# Patient Record
Sex: FEMALE | Race: BLACK OR AFRICAN AMERICAN | NOT HISPANIC OR LATINO | Employment: FULL TIME | URBAN - METROPOLITAN AREA
[De-identification: names, ages, dates, MRNs, and addresses within clinical notes are randomized per-mention and may not be internally consistent; named-entity substitution may affect disease eponyms.]

---

## 2017-03-24 ENCOUNTER — TELEPHONE (OUTPATIENT)
Dept: FAMILY MEDICINE CLINIC | Facility: CLINIC | Age: 42
End: 2017-03-24

## 2017-03-24 NOTE — TELEPHONE ENCOUNTER
----- Message from CHANDRAKANT Wiley sent at 3/24/2017 10:41 AM EDT -----  Contact: [phone]  What did she do when she fell? Is the visit for knee pain? Hand? Depends what is going on. i have a couple of openings later but depends what is going on if she should be seen here- if more severe ICC or ER (like stiches, possible broken bone, etc.)  ----- Message -----     From: Mariah Garcia MA     Sent: 3/24/2017  10:34 AM       To: CHANDRAKANT Wiley        ----- Message -----     From: Rosalva Norwood     Sent: 3/24/2017   9:13 AM       To: Mariah Garcia MA    PT FELL AND WANTS TO SEE SOMEONE TODAY. PLEASE CALL SHE SAID SHE DOESN'T WANT TO GO TO ER BECAUSE IT'S MORE EXPENSIVE    Pt informed and she said she fell running because of an emergency and fell and hurt her knee. Right knee is in more pain than the left. Advised ER if symptoms get worse.

## 2017-05-17 RX ORDER — PANTOPRAZOLE SODIUM 40 MG/1
TABLET, DELAYED RELEASE ORAL
Qty: 90 TABLET | Refills: 2 | Status: SHIPPED | OUTPATIENT
Start: 2017-05-17 | End: 2018-02-11 | Stop reason: SDUPTHER

## 2017-05-30 ENCOUNTER — TELEPHONE (OUTPATIENT)
Dept: FAMILY MEDICINE CLINIC | Facility: CLINIC | Age: 42
End: 2017-05-30

## 2017-07-19 RX ORDER — NORETHINDRONE ACETATE AND ETHINYL ESTRADIOL 1MG-20(21)
KIT ORAL
Qty: 30 TABLET | Refills: 0 | Status: SHIPPED | OUTPATIENT
Start: 2017-07-19 | End: 2017-07-28 | Stop reason: SDUPTHER

## 2017-07-28 RX ORDER — NORETHINDRONE ACETATE AND ETHINYL ESTRADIOL 1MG-20(21)
KIT ORAL
Qty: 28 TABLET | Refills: 1 | Status: SHIPPED | OUTPATIENT
Start: 2017-07-28 | End: 2017-11-22 | Stop reason: SDUPTHER

## 2017-08-17 ENCOUNTER — OFFICE VISIT (OUTPATIENT)
Dept: FAMILY MEDICINE CLINIC | Facility: CLINIC | Age: 42
End: 2017-08-17

## 2017-08-17 VITALS
HEIGHT: 66 IN | WEIGHT: 218 LBS | SYSTOLIC BLOOD PRESSURE: 104 MMHG | DIASTOLIC BLOOD PRESSURE: 58 MMHG | TEMPERATURE: 97.9 F | BODY MASS INDEX: 35.03 KG/M2 | OXYGEN SATURATION: 100 % | HEART RATE: 62 BPM

## 2017-08-17 DIAGNOSIS — R20.2 NUMBNESS AND TINGLING IN BOTH HANDS: ICD-10-CM

## 2017-08-17 DIAGNOSIS — M54.2 NECK PAIN: Primary | ICD-10-CM

## 2017-08-17 DIAGNOSIS — R20.0 NUMBNESS AND TINGLING IN BOTH HANDS: ICD-10-CM

## 2017-08-17 PROCEDURE — 99214 OFFICE O/P EST MOD 30 MIN: CPT | Performed by: FAMILY MEDICINE

## 2017-08-17 PROCEDURE — 72050 X-RAY EXAM NECK SPINE 4/5VWS: CPT | Performed by: FAMILY MEDICINE

## 2017-08-17 PROCEDURE — 72100 X-RAY EXAM L-S SPINE 2/3 VWS: CPT | Performed by: FAMILY MEDICINE

## 2017-08-17 RX ORDER — ALBUTEROL SULFATE 90 UG/1
2 AEROSOL, METERED RESPIRATORY (INHALATION) EVERY 4 HOURS PRN
COMMUNITY
End: 2017-11-22 | Stop reason: SDUPTHER

## 2017-08-17 RX ORDER — CYCLOBENZAPRINE HCL 5 MG
5 TABLET ORAL EVERY 8 HOURS PRN
Qty: 30 TABLET | Refills: 1 | Status: SHIPPED | OUTPATIENT
Start: 2017-08-17 | End: 2017-11-22

## 2017-08-17 RX ORDER — MELOXICAM 15 MG/1
15 TABLET ORAL DAILY
Qty: 30 TABLET | Refills: 1 | Status: SHIPPED | OUTPATIENT
Start: 2017-08-17 | End: 2018-06-07

## 2017-08-17 NOTE — PROGRESS NOTES
SUBJECTIVE:  The patient is a 42-year-old Afro-American female who comes in primarily for pain in her neck and numbness in her hands.  She's been going to physical therapy for 8 weeks and it's not helping.  She denies any specific injury.  Her job is physical and she uses her upper extremities quite a bit at work.  Her hands get numb primarily in the thumb second and third digits.  She doesn't recall any numbness in the remaining 2 digits.  She is also having discomfort in her lower back.  This does not radiate to her legs.    PAST MEDICAL HISTORY:  Reviewed.    REVIEW OF SYSTEMS:  Please see above; 14 point ROS otherwise negative.      OBJECTIVE: Vitals signs are reviewed and are stable.    HEENT: PERRLA.    Neck:  Supple.  Mild tenderness bilateral posterior cervical region.  Discomfort with left lateral movement.  Range of motion full.  Lungs:  Clear.    Heart:  Regular rate and rhythm.    Abdomen:   Soft, nontender.    Extremities:  No cyanosis, clubbing or edema.  Full range of motion.  Neuro: Grossly intact without motor or sensory deficits.  Deep tendon reflexes symmetrical.  Back: Mild tenderness is present in the left lumbar region.  Full range of motion.  Straight leg raise is negative.  Two-view x-ray lumbar spine shows no acute abnormality.  4 view x-ray of the C-spine shows loss of lordotic curve.  No acute bony abnormality.  Indication neck pain back pain.  No old x-rays for review.  X-rays done here and interpreted by me.  ASSESSMENT:    ]Neck pain  Paresthesia of the hands  Low-back pain    PLAN:  []The distribution of the paresthesia is suggestive of carpal tunnel syndrome.  The patient has not been on any medication for her symptoms.  She is prescribed meloxicam 15 mg daily and Flexeril 5 mg 3 times a day or daily at bedtime if too sedating.  She will continue physical therapy.  She will take vitamin B6.  She will notify me in a week or so if her symptoms have improved worsen or stayed the same.   At that time either EMG or MRI will be ordered.

## 2017-08-25 ENCOUNTER — TELEPHONE (OUTPATIENT)
Dept: FAMILY MEDICINE CLINIC | Facility: CLINIC | Age: 42
End: 2017-08-25

## 2017-08-28 ENCOUNTER — DOCUMENTATION (OUTPATIENT)
Dept: FAMILY MEDICINE CLINIC | Facility: CLINIC | Age: 42
End: 2017-08-28

## 2017-08-28 NOTE — PROGRESS NOTES
6/21/2017 10:16 AM    Patient called complaining of diarrhea and abdominal pain patient was instructed to go to emergency room.

## 2017-11-01 ENCOUNTER — TELEPHONE (OUTPATIENT)
Dept: FAMILY MEDICINE CLINIC | Facility: CLINIC | Age: 42
End: 2017-11-01

## 2017-11-22 ENCOUNTER — PROCEDURE VISIT (OUTPATIENT)
Dept: FAMILY MEDICINE CLINIC | Facility: CLINIC | Age: 42
End: 2017-11-22

## 2017-11-22 VITALS
BODY MASS INDEX: 35.52 KG/M2 | HEIGHT: 66 IN | OXYGEN SATURATION: 95 % | TEMPERATURE: 97.8 F | DIASTOLIC BLOOD PRESSURE: 66 MMHG | SYSTOLIC BLOOD PRESSURE: 100 MMHG | HEART RATE: 80 BPM | WEIGHT: 221 LBS

## 2017-11-22 DIAGNOSIS — Z01.419 ENCOUNTER FOR ROUTINE GYNECOLOGICAL EXAMINATION WITH PAPANICOLAOU SMEAR OF CERVIX: ICD-10-CM

## 2017-11-22 DIAGNOSIS — Z12.39 SPECIAL SCREENING EXAMINATION FOR NEOPLASM OF BREAST: ICD-10-CM

## 2017-11-22 DIAGNOSIS — Z12.9 CANCER SCREENING: Primary | ICD-10-CM

## 2017-11-22 DIAGNOSIS — S60.10XA SUBUNGUAL HEMATOMA OF DIGIT OF HAND, INITIAL ENCOUNTER: ICD-10-CM

## 2017-11-22 PROCEDURE — 99396 PREV VISIT EST AGE 40-64: CPT | Performed by: FAMILY MEDICINE

## 2017-11-22 RX ORDER — NORETHINDRONE ACETATE AND ETHINYL ESTRADIOL 1MG-20(21)
1 KIT ORAL DAILY
Qty: 28 TABLET | Refills: 1 | Status: SHIPPED | OUTPATIENT
Start: 2017-11-22 | End: 2017-12-31 | Stop reason: SDUPTHER

## 2017-11-22 RX ORDER — NORETHINDRONE ACETATE AND ETHINYL ESTRADIOL 1MG-20(21)
1 KIT ORAL DAILY
Qty: 28 TABLET | Refills: 0 | Status: SHIPPED | OUTPATIENT
Start: 2017-11-22 | End: 2017-11-22 | Stop reason: SDUPTHER

## 2017-11-22 RX ORDER — ALBUTEROL SULFATE 90 UG/1
2 AEROSOL, METERED RESPIRATORY (INHALATION) EVERY 4 HOURS PRN
Qty: 1 INHALER | Refills: 1 | Status: SHIPPED | OUTPATIENT
Start: 2017-11-22 | End: 2019-09-18 | Stop reason: SDUPTHER

## 2017-11-22 RX ORDER — FLUCONAZOLE 150 MG/1
150 TABLET ORAL ONCE
Qty: 1 TABLET | Refills: 0 | Status: SHIPPED | OUTPATIENT
Start: 2017-11-22 | End: 2017-11-22

## 2017-11-22 RX ORDER — FLUCONAZOLE 150 MG/1
150 TABLET ORAL ONCE
Qty: 1 TABLET | Refills: 2 | Status: SHIPPED | OUTPATIENT
Start: 2017-11-22 | End: 2017-11-22

## 2017-11-22 NOTE — PROGRESS NOTES
SUBJECTIVE:  The patient is [] a 42-year-old F chimeric female is here for Pap smear and breast exam.  She is doing fairly well.  She's had slight vaginal itching.  This started after she took an antibiotic.  She is yet to have a baseline mammogram.    PAST MEDICAL HISTORY:  Reviewed.    REVIEW OF SYSTEMS:  Please see above; 14 point ROS otherwise negative.      OBJECTIVE: Vitals signs are reviewed and are stable.    HEENT: PERRLA.  []  Neck:  Supple.  []  Lungs:  Clear.    Heart:  Regular rate and rhythm.  []  Breasts: No abnormal masses are detected.  No dimpling.  No nipple discharge.  Axilla free of masses.  Abdomen:   Soft, nontender.  []  Pelvic: External genitalia normal.  No adnexal masses or tenderness.  Uterus not enlarged.  No discharge noted.  EXT: Sublingual hematoma noted on the left great toe.  No signs of infection.  Nontender.            ASSESSMENT:    []Routine GYN/breast exam  Subungual hematoma left great toe    PLAN:  []Patient agrees to have a baseline mammogram.  Diflucan 150 by mouth ×1.  Oral contraceptive refill.  Observation of hematoma.  Patient will follow up as scheduled.  She will call if problems.  Diet and exercise discussed.  Patient will follow-up on results.    Much of this encounter note is an electronic transcription/translation of spoken language to printed text.  The electronic translation of spoken language may permit erroneous, or at times, nonsensical words or phrases to be inadvertently transcribed.  Although I have reviewed the note for such errors, some may still exist.

## 2017-11-25 LAB
CHROM ANALY OVERALL INTERP-IMP: NORMAL
CONV .: NORMAL
CONV PERFORMED BY:: NORMAL
DX ICD CODE: NORMAL
HIV 1 & 2 AB SER-IMP: NORMAL
REF LAB TEST METHOD: NORMAL
STAT OF ADQ CVX/VAG CYTO-IMP: NORMAL

## 2018-01-01 RX ORDER — NORETHINDRONE ACETATE AND ETHINYL ESTRADIOL 1MG-20(21)
KIT ORAL
Qty: 28 TABLET | Refills: 0 | Status: SHIPPED | OUTPATIENT
Start: 2018-01-01 | End: 2018-01-10 | Stop reason: SDUPTHER

## 2018-01-04 ENCOUNTER — HOSPITAL ENCOUNTER (OUTPATIENT)
Dept: MAMMOGRAPHY | Facility: HOSPITAL | Age: 43
Discharge: HOME | End: 2018-01-04
Admitting: FAMILY MEDICINE

## 2018-01-04 ENCOUNTER — TELEPHONE (OUTPATIENT)
Dept: FAMILY MEDICINE CLINIC | Facility: CLINIC | Age: 43
End: 2018-01-04

## 2018-01-04 PROCEDURE — 77067 SCR MAMMO BI INCL CAD: CPT

## 2018-01-11 RX ORDER — NORETHINDRONE ACETATE AND ETHINYL ESTRADIOL 1MG-20(21)
KIT ORAL
Qty: 28 TABLET | Refills: 1 | Status: SHIPPED | OUTPATIENT
Start: 2018-01-11 | End: 2018-01-18 | Stop reason: SDUPTHER

## 2018-01-18 ENCOUNTER — TELEPHONE (OUTPATIENT)
Dept: FAMILY MEDICINE CLINIC | Facility: CLINIC | Age: 43
End: 2018-01-18

## 2018-01-18 RX ORDER — NORETHINDRONE ACETATE AND ETHINYL ESTRADIOL 1MG-20(21)
1 KIT ORAL DAILY
Qty: 28 TABLET | Refills: 6 | Status: SHIPPED | OUTPATIENT
Start: 2018-01-18 | End: 2018-02-01 | Stop reason: SDUPTHER

## 2018-02-01 RX ORDER — NORETHINDRONE ACETATE AND ETHINYL ESTRADIOL 1MG-20(21)
1 KIT ORAL DAILY
Qty: 28 TABLET | Refills: 6 | Status: SHIPPED | OUTPATIENT
Start: 2018-02-01 | End: 2018-06-07

## 2018-02-12 RX ORDER — PANTOPRAZOLE SODIUM 40 MG/1
TABLET, DELAYED RELEASE ORAL
Qty: 90 TABLET | Refills: 2 | Status: SHIPPED | OUTPATIENT
Start: 2018-02-12 | End: 2018-07-03 | Stop reason: SDUPTHER

## 2018-05-21 RX ORDER — PANTOPRAZOLE SODIUM 40 MG/1
TABLET, DELAYED RELEASE ORAL
Qty: 30 TABLET | Refills: 2 | Status: SHIPPED | OUTPATIENT
Start: 2018-05-21 | End: 2018-11-11 | Stop reason: SDUPTHER

## 2018-06-07 ENCOUNTER — OFFICE VISIT (OUTPATIENT)
Dept: FAMILY MEDICINE CLINIC | Facility: CLINIC | Age: 43
End: 2018-06-07

## 2018-06-07 VITALS
DIASTOLIC BLOOD PRESSURE: 60 MMHG | HEIGHT: 66 IN | HEART RATE: 74 BPM | TEMPERATURE: 98.6 F | WEIGHT: 221.6 LBS | RESPIRATION RATE: 16 BRPM | OXYGEN SATURATION: 100 % | SYSTOLIC BLOOD PRESSURE: 100 MMHG | BODY MASS INDEX: 35.62 KG/M2

## 2018-06-07 DIAGNOSIS — Z00.00 HEALTHCARE MAINTENANCE: Primary | ICD-10-CM

## 2018-06-07 DIAGNOSIS — Z30.09 GENERAL COUNSELING AND ADVICE ON FEMALE CONTRACEPTION: ICD-10-CM

## 2018-06-07 DIAGNOSIS — M54.2 NECK PAIN: ICD-10-CM

## 2018-06-07 DIAGNOSIS — G56.01 CARPAL TUNNEL SYNDROME OF RIGHT WRIST: ICD-10-CM

## 2018-06-07 DIAGNOSIS — Z23 NEED FOR VACCINATION: ICD-10-CM

## 2018-06-07 PROCEDURE — 90471 IMMUNIZATION ADMIN: CPT | Performed by: NURSE PRACTITIONER

## 2018-06-07 PROCEDURE — 99214 OFFICE O/P EST MOD 30 MIN: CPT | Performed by: NURSE PRACTITIONER

## 2018-06-07 PROCEDURE — 90632 HEPA VACCINE ADULT IM: CPT | Performed by: NURSE PRACTITIONER

## 2018-06-07 RX ORDER — RANITIDINE 150 MG/1
TABLET ORAL
Refills: 11 | COMMUNITY
Start: 2018-03-07 | End: 2019-07-11

## 2018-06-07 NOTE — PATIENT INSTRUCTIONS
She will return for fasting labs.   Refer to GYN for consult about surgical contraception management.   Refer to hand surgery will call with appt.   MRI cervical ordered will call with appt.   Hep A dose #1 given today, repeat dose #2 in 6-12 months.   encouraged heat and ice alternating as needed, good footwear, cont PT as needed.   Increase fluid intake, get plenty of rest.   Patient agrees with plan of care and understands instructions. Call if worsening symptoms or any problems or concerns.

## 2018-06-07 NOTE — PROGRESS NOTES
Subjective   Vianney Villarreal is a 42 y.o. female.     History of Present Illness   Here today for arthralgia, she works at [employer], physical [employer], she went to PT but not helping, she has neck pain, has pain down right arm at times, she is right handed, does repetitive job, she has tried aleve, tried mobic but not helping, she has tried muscle relaxer, makes too drowsy, she would like MRI neck. She has pain in right shoulder upper back. She denies low back pain. She does a lot of hammering and twisting with right arm. She has sharp pain in upper back and neck. She denies numbness and tingling.   She would also like hep A injection today. She is over due for labs. She is currently taking OCP, she states last pap last year, does not see GYN, she would like to stop OCP and interested in tubal. She does have regular menses, she is not sexually active.     The following portions of the patient's history were reviewed and updated as appropriate: allergies, current medications, past family history, past medical history, past social history, past surgical history and problem list.    Review of Systems   Constitutional: Negative for chills, diaphoresis and fever.   Respiratory: Negative for cough and shortness of breath.    Cardiovascular: Negative for chest pain.   Musculoskeletal: Positive for neck pain. Negative for arthralgias and myalgias.   Skin: Negative for pallor.   Neurological: Negative for dizziness, light-headedness and headache.   All other systems reviewed and are negative.      Objective   Physical Exam   Constitutional: She is oriented to person, place, and time. She appears well-developed and well-nourished.   HENT:   Head: Normocephalic.   Eyes: Pupils are equal, round, and reactive to light.   Neck: Spinous process tenderness and muscular tenderness present. Decreased range of motion present.   Cardiovascular: Normal rate, regular rhythm, normal heart sounds and intact distal pulses.    Pulmonary/Chest: Effort  normal and breath sounds normal.   Musculoskeletal:        Right shoulder: Normal.        Cervical back: She exhibits decreased range of motion, tenderness and bony tenderness. She exhibits no swelling, no edema, no pain, no spasm and normal pulse.        Right hand: She exhibits normal range of motion, no tenderness, no bony tenderness, normal capillary refill and no swelling. Normal sensation noted. Normal strength noted.   Lymphadenopathy:     She has no cervical adenopathy.   Neurological: She is alert and oriented to person, place, and time. She has normal strength. No cranial nerve deficit or sensory deficit. She displays a negative Romberg sign.   Skin: Skin is warm and dry.   Psychiatric: She has a normal mood and affect. Her behavior is normal.   Nursing note and vitals reviewed.        Assessment/Plan   Vianney was seen today for generalized body aches, immunizations and contraception.    Diagnoses and all orders for this visit:    Healthcare maintenance  -     CBC & Differential; Future  -     Comprehensive Metabolic Panel; Future  -     TSH; Future  -     Lipid Panel; Future    Need for vaccination    Neck pain  -     CBC & Differential; Future  -     Comprehensive Metabolic Panel; Future  -     MRI Cervical Spine Without Contrast; Future    Carpal tunnel syndrome of right wrist  -     Ambulatory Referral to Hand Surgery    General counseling and advice on female contraception  -     Ambulatory Referral to Obstetrics / Gynecology    Other orders  -     Hepatitis A Vaccine Adult IM      She will return for fasting labs.   Refer to GYN for consult about surgical contraception management.   Refer to hand surgery will call with appt.   MRI cervical ordered will call with appt.   Hep A dose #1 given today, repeat dose #2 in 6-12 months.   encouraged heat and ice alternating as needed, good footwear, cont PT as needed.   Increase fluid intake, get plenty of rest.   Patient agrees with plan of care and  understands instructions. Call if worsening symptoms or any problems or concerns.

## 2018-06-14 ENCOUNTER — TELEPHONE (OUTPATIENT)
Dept: FAMILY MEDICINE CLINIC | Facility: CLINIC | Age: 43
End: 2018-06-14

## 2018-06-14 ENCOUNTER — LAB (OUTPATIENT)
Dept: FAMILY MEDICINE CLINIC | Facility: CLINIC | Age: 43
End: 2018-06-14

## 2018-06-14 DIAGNOSIS — Z00.00 HEALTHCARE MAINTENANCE: ICD-10-CM

## 2018-06-14 DIAGNOSIS — M54.2 NECK PAIN: ICD-10-CM

## 2018-06-14 LAB
ALBUMIN SERPL-MCNC: 3.7 G/DL (ref 3.5–5.2)
ALBUMIN/GLOB SERPL: 1.2 G/DL
ALP SERPL-CCNC: 62 U/L (ref 39–117)
ALT SERPL W P-5'-P-CCNC: 19 U/L (ref 1–33)
ANION GAP SERPL CALCULATED.3IONS-SCNC: 10.8 MMOL/L
AST SERPL-CCNC: 23 U/L (ref 1–32)
BILIRUB SERPL-MCNC: 0.8 MG/DL (ref 0.1–1.2)
BUN BLD-MCNC: 11 MG/DL (ref 6–20)
BUN/CREAT SERPL: 12.4 (ref 7–25)
CALCIUM SPEC-SCNC: 9.1 MG/DL (ref 8.6–10.5)
CHLORIDE SERPL-SCNC: 105 MMOL/L (ref 98–107)
CHOLEST SERPL-MCNC: 160 MG/DL (ref 0–200)
CO2 SERPL-SCNC: 25.2 MMOL/L (ref 22–29)
CREAT BLD-MCNC: 0.89 MG/DL (ref 0.57–1)
ERYTHROCYTE [DISTWIDTH] IN BLOOD BY AUTOMATED COUNT: 12.4 % (ref 4.5–15)
GFR SERPL CREATININE-BSD FRML MDRD: 85 ML/MIN/1.73
GLOBULIN UR ELPH-MCNC: 3.2 GM/DL
GLUCOSE BLD-MCNC: 98 MG/DL (ref 65–99)
HCT VFR BLD AUTO: 40.3 % (ref 31–42)
HDLC SERPL-MCNC: 66 MG/DL (ref 40–60)
HGB BLD-MCNC: 13.9 G/DL (ref 12–18)
LDLC SERPL CALC-MCNC: 79 MG/DL (ref 0–100)
LDLC/HDLC SERPL: 1.19 {RATIO}
LYMPHOCYTES # BLD AUTO: 2.2 10*3/MM3 (ref 1.2–3.4)
LYMPHOCYTES NFR BLD AUTO: 40.3 % (ref 21–51)
MCH RBC QN AUTO: 31.3 PG (ref 26.1–33.1)
MCHC RBC AUTO-ENTMCNC: 34.5 G/DL (ref 33–37)
MCV RBC AUTO: 90.7 FL (ref 80–99)
MONOCYTES # BLD AUTO: 0.3 10*3/MM3 (ref 0.1–0.6)
MONOCYTES NFR BLD AUTO: 5.7 % (ref 2–9)
NEUTROPHILS # BLD AUTO: 3 10*3/MM3 (ref 1.4–6.5)
NEUTROPHILS NFR BLD AUTO: 54 % (ref 42–75)
PLATELET # BLD AUTO: 336 10*3/MM3 (ref 150–450)
PMV BLD AUTO: 7.9 FL (ref 7.1–10.5)
POTASSIUM BLD-SCNC: 4.2 MMOL/L (ref 3.5–5.2)
PROT SERPL-MCNC: 6.9 G/DL (ref 6–8.5)
RBC # BLD AUTO: 4.45 10*6/MM3 (ref 4–6)
SODIUM BLD-SCNC: 141 MMOL/L (ref 136–145)
TRIGL SERPL-MCNC: 76 MG/DL (ref 0–150)
TSH SERPL DL<=0.05 MIU/L-ACNC: 0.85 MIU/ML (ref 0.27–4.2)
VLDLC SERPL-MCNC: 15.2 MG/DL (ref 5–40)
WBC NRBC COR # BLD: 5.5 10*3/MM3 (ref 4.5–10)

## 2018-06-14 PROCEDURE — 84443 ASSAY THYROID STIM HORMONE: CPT | Performed by: NURSE PRACTITIONER

## 2018-06-14 PROCEDURE — 36415 COLL VENOUS BLD VENIPUNCTURE: CPT | Performed by: NURSE PRACTITIONER

## 2018-06-14 PROCEDURE — 80053 COMPREHEN METABOLIC PANEL: CPT | Performed by: NURSE PRACTITIONER

## 2018-06-14 PROCEDURE — 85025 COMPLETE CBC W/AUTO DIFF WBC: CPT | Performed by: NURSE PRACTITIONER

## 2018-06-14 PROCEDURE — 80061 LIPID PANEL: CPT | Performed by: NURSE PRACTITIONER

## 2018-06-14 NOTE — TELEPHONE ENCOUNTER
----- Message from CHANDRAKANT Manuel sent at 6/14/2018 12:26 PM EDT -----  Please call patient with results. Thyroid, BS, kidney and liver func, CHOL all good.    Pt informed of lab results

## 2018-06-14 NOTE — TELEPHONE ENCOUNTER
----- Message from CHANDRAKANT Manuel sent at 6/14/2018 11:41 AM EDT -----  Please call patient with results. Cbc is normal.    Pt informed of lab results

## 2018-06-14 NOTE — TELEPHONE ENCOUNTER
----- Message from CHANDRAKANT Manuel sent at 6/14/2018 12:26 PM EDT -----  Please call patient with results. Thyroid, BS, kidney and liver func, CHOL all good.    LMTRC

## 2018-06-19 ENCOUNTER — HOSPITAL ENCOUNTER (OUTPATIENT)
Dept: MRI IMAGING | Facility: HOSPITAL | Age: 43
Discharge: HOME | End: 2018-06-19
Admitting: NURSE PRACTITIONER

## 2018-06-19 DIAGNOSIS — M54.2 NECK PAIN: ICD-10-CM

## 2018-06-19 PROCEDURE — 72141 MRI NECK SPINE W/O DYE: CPT

## 2018-06-21 ENCOUNTER — TELEPHONE (OUTPATIENT)
Dept: FAMILY MEDICINE CLINIC | Facility: CLINIC | Age: 43
End: 2018-06-21

## 2018-06-21 DIAGNOSIS — M50.30 BULGING OF CERVICAL INTERVERTEBRAL DISC: ICD-10-CM

## 2018-06-21 DIAGNOSIS — R93.89 ABNORMAL MRI, NECK: ICD-10-CM

## 2018-06-21 DIAGNOSIS — M54.2 NECK PAIN: Primary | ICD-10-CM

## 2018-06-21 NOTE — TELEPHONE ENCOUNTER
[facility] CALLED REGARDING A NEW RX BY THE NAME OF BLISOVI SE 1/20 TABLET, 28 1/20 (SPELLING?)

## 2018-06-21 NOTE — TELEPHONE ENCOUNTER
----- Message from CHANDRAKANT Manuel sent at 6/21/2018 11:08 AM EDT -----  Please call patient with results.  MRI neck shows C5-6 disc protrusion, bulging disc and narrowing at C5-6, and a bone spur on C4-5 causing narrowing, will put in neurosurgery referral and she will be called with appt.    Pt informed of MRI results

## 2018-06-26 ENCOUNTER — TELEPHONE (OUTPATIENT)
Dept: FAMILY MEDICINE CLINIC | Facility: CLINIC | Age: 43
End: 2018-06-26

## 2018-07-03 ENCOUNTER — OFFICE VISIT (OUTPATIENT)
Dept: NEUROSURGERY | Facility: CLINIC | Age: 43
End: 2018-07-03

## 2018-07-03 VITALS
RESPIRATION RATE: 16 BRPM | WEIGHT: 227 LBS | HEART RATE: 76 BPM | DIASTOLIC BLOOD PRESSURE: 62 MMHG | SYSTOLIC BLOOD PRESSURE: 102 MMHG | HEIGHT: 66 IN | BODY MASS INDEX: 36.48 KG/M2

## 2018-07-03 DIAGNOSIS — E66.9 OBESITY (BMI 35.0-39.9 WITHOUT COMORBIDITY): ICD-10-CM

## 2018-07-03 DIAGNOSIS — M50.30 DDD (DEGENERATIVE DISC DISEASE), CERVICAL: ICD-10-CM

## 2018-07-03 DIAGNOSIS — M54.2 NECK PAIN OF OVER 3 MONTHS DURATION: Primary | ICD-10-CM

## 2018-07-03 DIAGNOSIS — J45.909 MODERATE ASTHMA WITHOUT COMPLICATION, UNSPECIFIED WHETHER PERSISTENT: ICD-10-CM

## 2018-07-03 PROCEDURE — 99244 OFF/OP CNSLTJ NEW/EST MOD 40: CPT | Performed by: NEUROLOGICAL SURGERY

## 2018-07-03 RX ORDER — NORETHINDRONE ACETATE AND ETHINYL ESTRADIOL 1MG-20(21)
1 KIT ORAL DAILY
Refills: 6 | COMMUNITY
Start: 2018-06-16 | End: 2018-10-18 | Stop reason: SDUPTHER

## 2018-07-03 NOTE — PROGRESS NOTES
"Subjective   Patient ID: Vianney Villarreal is a 43 y.o. female is being seen for consultation today at the request of CHANDRAKANT Manuel for neck and right arm pain. She is unaccompanied.    History of Present Illness    She presents to the office today at the request of her primary care provider for neck and right arm pain.  She has had cervical MRI imaging at [facility] on June 19, 2018.    She reports bilateral upper extremity pain and weakness for the past year and a half.  She has been in physical therapy since his symptoms started and does not feel that it has helped.  She ultimately got an MRI because physical therapy was not able to give her any relief.  She works at [employer] on the [employer] line doing lots of repetitive motions.  This makes the pain in her arms and neck worse.  She currently reports posterior neck and bilateral upper back and shoulder discomfort, right greater than left.  She has pain that radiates down the right arm intermittently currently has no pain.  She denies any numbness or tingling in either arm.  She reports weakness with trying to open things, such as jars, at her home.    She denies any problems with her legs.  She also denies any bowel or bladder issues.  She takes Aleve to help with the ongoing discomfort.  She takes this on an as-needed basis.    She presents unaccompanied.      /62 (BP Location: Right arm, Patient Position: Sitting, Cuff Size: Adult)   Pulse 76   Resp 16   Ht 167.6 cm (66\")   Wt 103 kg (227 lb)   BMI 36.64 kg/m²     The following portions of the patient's history were reviewed and updated as appropriate: allergies, current medications, past family history, past medical history, past social history, past surgical history and problem list.    Review of Systems   Constitutional: Negative for fever.   HENT: Negative for trouble swallowing.    Eyes: Negative for visual disturbance.   Respiratory: Negative for cough and wheezing.  "   Cardiovascular: Negative for chest pain and palpitations.   Gastrointestinal: Negative for abdominal pain.   Genitourinary: Negative for difficulty urinating and enuresis.   Musculoskeletal: Positive for neck pain (into RUE intermittently). Negative for gait problem.   Neurological: Positive for weakness (right arm) and numbness (hands when sleeping).   Psychiatric/Behavioral: Negative for sleep disturbance.       Objective   Physical Exam   Constitutional: She is oriented to person, place, and time. Vital signs are normal. She appears well-developed and well-nourished. She is cooperative.   HENT:   Head: Normocephalic and atraumatic.   Neck: Neck supple. No tracheal deviation present.   Pulmonary/Chest: Effort normal and breath sounds normal. No respiratory distress.   Abdominal: Soft.   Musculoskeletal: Normal range of motion. She exhibits tenderness (Bilateral neck and upper back tenderness). She exhibits no edema or deformity.        Cervical back: She exhibits tenderness and pain. She exhibits normal range of motion, no bony tenderness, no swelling, no edema and no deformity.   Moving all extremities well   normal muscle bulk and tone throughout   full Cervical range of motion strength equal in normal bilateral upper extremities     Neurological: She is alert and oriented to person, place, and time. She has normal strength. She displays no tremor and normal reflexes. No cranial nerve deficit or sensory deficit. She exhibits normal muscle tone. Coordination and gait normal. GCS eye subscore is 4. GCS verbal subscore is 5. GCS motor subscore is 6.   Reflex Scores:       Tricep reflexes are 2+ on the right side and 2+ on the left side.       Bicep reflexes are 2+ on the right side and 2+ on the left side.       Brachioradialis reflexes are 2+ on the right side and 2+ on the left side.       Patellar reflexes are 2+ on the right side and 2+ on the left side.       Achilles reflexes are 2+ on the right side and 2+  on the left side.  Negative Spurling's, negative Warren's  Normal motor and sensory exam bilateral upper extremities  Normal deep tendon reflexes  Sensory intact to soft touch, temperature and vibration with normal proprioception  Gait is stable and upright   Skin: Skin is warm and dry.   Psychiatric: She has a normal mood and affect. Her behavior is normal. Thought content normal.   Vitals reviewed.    Neurologic Exam     Mental Status   Oriented to person, place, and time.     Motor Exam     Strength   Strength 5/5 throughout.     Gait, Coordination, and Reflexes     Reflexes   Right brachioradialis: 2+  Left brachioradialis: 2+  Right biceps: 2+  Left biceps: 2+  Right triceps: 2+  Left triceps: 2+  Right patellar: 2+  Left patellar: 2+  Right achilles: 2+  Left achilles: 2+      Assessment/Plan   Independent Review of Radiographic Studies:    I personally reviewed the MRI scan of the cervical spine demonstrates multilevel degenerative arthritic changes as described in the radiology report there are disc osteophyte complexes at C5 6 and at C4 5 without significant canal compromise.  There is some neural foraminal narrowing on the right at C5 6.    Medical Decision Making:    I confirmed and obtained the above history as recorded by the nurse practitioner acting as a scribe. I performed the above examination and it is documented by the nurse practitioner acting as a scribe.    Patient presents with the above-noted history and physical findings.  There are no red flags.    I explained to the patient that I did not think that there was a surgical solution to this particular problem.  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  I did explain to the patient that in light of the arthritic changes in the cervical spine this might not be the best profession for her since she is not really worked heavy physical labor her entire life and has not spent time  "developing the muscles necessary in order to avoid injury.  I do not think that she is suffered a Worker's Compensation injury at this point but I do think that she may be at risk of this since she does have pre-existing neck pain and degenerative arthritic changes in the cervical spine and she is doing heavy physical labor.    The patient presents with the above-noted history and physical findings. There are no red flags.     I explained to the patient that \"preventative maintenance\" of the lumbar spine revolves around 3 specific lifestyle issues. This is related to diet, physical activity, and posture.     I suggested that dietary changes related to absolute maintenance of a ideal body weight significantly reduces stress on the lumbar spine and can reduce chronic low back pain. I also explained that certain foods are more inflammatory than other physicians and that there is growing evidence that the micro-biome Healthy with a diet that is low in simple carbohydrates and high in in plant-based carbohydrates can reduce overall body inflammation and may reduce the development of osteoarthritis over time.     I recommended that she consider specific physical workouts that revolves around core strengthening in order to maintain healthy muscular support of the lumbar spine. I explained that the structure of are spine is similar to all animals but all other animals supports her spine on 4 legs and we, singular among all animals stand on our repair legs and thus we require quite a bit more core musculature to support our lumbar spine in healthful manner.     And finally I recommended a book called \"8 Steps to a Pain Free Back\" which is written by Flory Carter and available on [employer] for less than the price of a single physical therapy co-pay. This book outlines this injury that posture plays a very specific role in the development of the western civilizations epitympanic of spine disease. It compares third world " "economy's and our first world relatively physically less challenging lifestyle. It points out that the postural habits of people in the third world are ingrained and that they suffer from minimal complaints with regard to back discomfort or neck discomfort despite the substantial increase in physical labor that they perform on a daily basis. Most importantly this book outlines and exercise program to strengthen core musculature and other muscles of posture both from the spine in the lumbar region and the neck.And finally I recommended a book called \"Eight Steps to a Pain Free Back\" which is written by Flory Carter and available on [employer] for less than the price of a single physical therapy co-pay. This book outlines this injury that posture plays a very specific role in the development of the western civilizations epidemic of spine disease. It compares third world economy's and our first world relatively physically less challenging lifestyle. It points out that the postural habits of people in the third world are ingrained and that they suffer from minimal complaints with regard to back discomfort or neck discomfort despite the substantial increase in physical labor that they perform on a daily basis. Most importantly this book outlines an exercise program to strengthen core musculature and other muscles of posture both from the spine in the lumbar region and the neck. It has worked for me and may other pateints who have tried the exercise program.    Vianney was seen today for neck pain and arm pain.    Diagnoses and all orders for this visit:    Neck pain of over 3 months duration    DDD (degenerative disc disease), cervical    Obesity (BMI 35.0-39.9 without comorbidity)    Moderate asthma without complication, unspecified whether persistent      Return if symptoms worsen or fail to improve.                 "

## 2018-07-25 ENCOUNTER — TELEPHONE (OUTPATIENT)
Dept: FAMILY MEDICINE CLINIC | Facility: CLINIC | Age: 43
End: 2018-07-25

## 2018-10-18 ENCOUNTER — OFFICE VISIT (OUTPATIENT)
Dept: FAMILY MEDICINE CLINIC | Facility: CLINIC | Age: 43
End: 2018-10-18

## 2018-10-18 VITALS
SYSTOLIC BLOOD PRESSURE: 96 MMHG | BODY MASS INDEX: 37.12 KG/M2 | TEMPERATURE: 98.1 F | WEIGHT: 231 LBS | HEIGHT: 66 IN | HEART RATE: 75 BPM | OXYGEN SATURATION: 99 % | DIASTOLIC BLOOD PRESSURE: 60 MMHG

## 2018-10-18 DIAGNOSIS — E66.9 OBESITY (BMI 35.0-39.9 WITHOUT COMORBIDITY): ICD-10-CM

## 2018-10-18 DIAGNOSIS — Z30.09 STERILIZATION CONSULT: ICD-10-CM

## 2018-10-18 DIAGNOSIS — Z12.39 SCREENING FOR BREAST CANCER: ICD-10-CM

## 2018-10-18 DIAGNOSIS — Z12.4 ENCOUNTER FOR PAPANICOLAOU SMEAR FOR CERVICAL CANCER SCREENING: Primary | ICD-10-CM

## 2018-10-18 PROCEDURE — 99396 PREV VISIT EST AGE 40-64: CPT | Performed by: FAMILY MEDICINE

## 2018-10-18 RX ORDER — NORETHINDRONE ACETATE AND ETHINYL ESTRADIOL 1MG-20(21)
1 KIT ORAL DAILY
Qty: 28 TABLET | Refills: 11 | Status: SHIPPED | OUTPATIENT
Start: 2018-10-18 | End: 2018-11-28 | Stop reason: SDUPTHER

## 2018-10-18 RX ORDER — FLUCONAZOLE 150 MG/1
150 TABLET ORAL ONCE
Qty: 1 TABLET | Refills: 0 | Status: SHIPPED | OUTPATIENT
Start: 2018-10-18 | End: 2018-10-18

## 2018-10-18 NOTE — PROGRESS NOTES
SUBJECTIVE:  The patient is a 43-year-old -American female who presents for gynecologic exam.  She has concerns over losing weight.  She gains about 5 pounds here.  She had normal thyroid studies fairly recently.  She needs a renewal of her birth control pill.  She wants her tubes tied.  She complains of vaginal itching.    PAST MEDICAL HISTORY:  Reviewed.    REVIEW OF SYSTEMS:  Please see above; 14 point ROS negative.      OBJECTIVE: Vitals signs are reviewed and are stable.    HEENT: PERRLA.   Neck:  Supple.   Lungs:  Clear.    Heart:  Regular rate and rhythm.  Breasts: Fibrocystic changes noted.  Axilla free of masses.  No nipple discharge.  No dimpling.  No abnormal masses detected.   Abdomen:   Soft, nontender.   Ultimate: Scant whitish discharge noted.  No adnexal masses or tenderness can be felt.  Uterus not enlarged.  Extremities:  No cyanosis, clubbing or edema.     ASSESSMENT:    Gynecological/breast exam  Vaginitis  Request for sterilization  Inability to lose weight    PLAN: Refer to GYN.  Mammogram scheduled.  Diflucan 150×1.  Refill oral contraceptives.  1800-calorie diet.  Healthy lifestyle discussed.  Patient will follow up on Pap smear.

## 2018-11-12 RX ORDER — PANTOPRAZOLE SODIUM 40 MG/1
TABLET, DELAYED RELEASE ORAL
Qty: 90 TABLET | Refills: 2 | Status: SHIPPED | OUTPATIENT
Start: 2018-11-12 | End: 2019-07-11

## 2018-11-28 ENCOUNTER — TELEPHONE (OUTPATIENT)
Dept: FAMILY MEDICINE CLINIC | Facility: CLINIC | Age: 43
End: 2018-11-28

## 2018-11-28 RX ORDER — NORETHINDRONE ACETATE AND ETHINYL ESTRADIOL 1MG-20(21)
1 KIT ORAL DAILY
Qty: 28 TABLET | Refills: 3 | Status: SHIPPED | OUTPATIENT
Start: 2018-11-28 | End: 2019-03-12 | Stop reason: SDUPTHER

## 2018-12-03 ENCOUNTER — TRANSCRIBE ORDERS (OUTPATIENT)
Dept: ADMINISTRATIVE | Facility: HOSPITAL | Age: 43
End: 2018-12-03

## 2018-12-03 DIAGNOSIS — Z12.31 VISIT FOR SCREENING MAMMOGRAM: Primary | ICD-10-CM

## 2019-03-13 RX ORDER — NORETHINDRONE ACETATE AND ETHINYL ESTRADIOL 1MG-20(21)
KIT ORAL
Qty: 28 TABLET | Refills: 1 | Status: SHIPPED | OUTPATIENT
Start: 2019-03-13 | End: 2019-05-14 | Stop reason: SDUPTHER

## 2019-03-28 ENCOUNTER — OFFICE VISIT (OUTPATIENT)
Dept: FAMILY MEDICINE CLINIC | Facility: CLINIC | Age: 44
End: 2019-03-28

## 2019-03-28 VITALS
SYSTOLIC BLOOD PRESSURE: 110 MMHG | DIASTOLIC BLOOD PRESSURE: 64 MMHG | TEMPERATURE: 98.3 F | BODY MASS INDEX: 37.9 KG/M2 | WEIGHT: 235.8 LBS | OXYGEN SATURATION: 93 % | HEART RATE: 78 BPM | HEIGHT: 66 IN

## 2019-03-28 DIAGNOSIS — M25.562 ACUTE PAIN OF LEFT KNEE: Primary | ICD-10-CM

## 2019-03-28 PROCEDURE — 73560 X-RAY EXAM OF KNEE 1 OR 2: CPT | Performed by: NURSE PRACTITIONER

## 2019-03-28 PROCEDURE — 99213 OFFICE O/P EST LOW 20 MIN: CPT | Performed by: NURSE PRACTITIONER

## 2019-03-28 NOTE — PROGRESS NOTES
Subjective   Vianney Villarreal is a 43 y.o. female.     History of Present Illness   C/o left knee pain, felt pop, now having more consistent pain, states she felt pain while going up steps about 1 week ago, no injury noted, she felt pain when bending knee. She tried ibuprofen and aleve OTC. She states she has pain when getting up, she denies pain with ambulation.        The following portions of the patient's history were reviewed and updated as appropriate: allergies, current medications, past family history, past medical history, past social history, past surgical history and problem list.    Review of Systems   Constitutional: Negative for chills, diaphoresis and fever.   Respiratory: Negative for cough and shortness of breath.    Cardiovascular: Negative for chest pain.   Musculoskeletal: Positive for arthralgias. Negative for myalgias.   Neurological: Negative for dizziness, light-headedness and headache.   All other systems reviewed and are negative.      Objective   Physical Exam   Constitutional: She is oriented to person, place, and time. She appears well-developed and well-nourished.   HENT:   Head: Normocephalic.   Eyes: Pupils are equal, round, and reactive to light.   Cardiovascular: Normal rate, regular rhythm, normal heart sounds and intact distal pulses.   Pulmonary/Chest: Effort normal and breath sounds normal.   Musculoskeletal: Normal range of motion.        Right knee: Normal.        Left knee: She exhibits swelling. She exhibits normal range of motion and no effusion. Tenderness found. Patellar tendon tenderness noted.   Neurological: She is alert and oriented to person, place, and time.   Skin: Skin is warm and dry.   Psychiatric: She has a normal mood and affect. Her behavior is normal.   Nursing note and vitals reviewed.   Left knee xray today for pain, swelling, no comparison shows NAD,awaiting radiology over read     Assessment/Plan   Vianney was seen today for knee pain.    Diagnoses and all  orders for this visit:    Acute pain of left knee  -     XR Knee 1 or 2 View Left (In Office)        Knee xray today will call with results.  Encouraged rest, ice, elevation, knee sleeve OTC daily.  May cont aleve OTC 2x day.  If symptoms persist 3-5 days call office will refer to ortho.   Increase fluid intake, get plenty of rest.   Patient agrees with plan of care and understands instructions. Call if worsening symptoms or any problems or concerns

## 2019-03-28 NOTE — PATIENT INSTRUCTIONS
Knee xray today will call with results.  Encouraged rest, ice, elevation, knee sleeve OTC daily.  May cont aleve OTC 2x day.  If symptoms persist 3-5 days call office will refer to ortho.   Increase fluid intake, get plenty of rest.   Patient agrees with plan of care and understands instructions. Call if worsening symptoms or any problems or concerns.

## 2019-05-14 RX ORDER — NORETHINDRONE ACETATE AND ETHINYL ESTRADIOL 1MG-20(21)
KIT ORAL
Qty: 28 TABLET | Refills: 0 | OUTPATIENT
Start: 2019-05-14

## 2019-05-14 RX ORDER — NORETHINDRONE ACETATE AND ETHINYL ESTRADIOL 1MG-20(21)
1 KIT ORAL DAILY
Qty: 28 TABLET | Refills: 5 | Status: SHIPPED | OUTPATIENT
Start: 2019-05-14 | End: 2019-05-16 | Stop reason: SDUPTHER

## 2019-05-16 ENCOUNTER — TELEPHONE (OUTPATIENT)
Dept: FAMILY MEDICINE CLINIC | Facility: CLINIC | Age: 44
End: 2019-05-16

## 2019-05-16 RX ORDER — NORETHINDRONE ACETATE AND ETHINYL ESTRADIOL 1MG-20(21)
1 KIT ORAL DAILY
Qty: 28 TABLET | Refills: 5 | Status: SHIPPED | OUTPATIENT
Start: 2019-05-16 | End: 2020-08-24

## 2019-05-16 NOTE — TELEPHONE ENCOUNTER
PATIENT WAS HERE ON 10/18/18 FOR PAP SMEAR. CAN SHE GET REFILLED BLISOVI FE 1/20 1-20 MG-MCG per tablet FOR MORE THEN ONE MONTH

## 2019-07-11 ENCOUNTER — APPOINTMENT (OUTPATIENT)
Dept: PREADMISSION TESTING | Facility: HOSPITAL | Age: 44
End: 2019-07-11

## 2019-07-11 VITALS
WEIGHT: 236 LBS | DIASTOLIC BLOOD PRESSURE: 66 MMHG | HEART RATE: 79 BPM | RESPIRATION RATE: 16 BRPM | OXYGEN SATURATION: 99 % | BODY MASS INDEX: 39.32 KG/M2 | TEMPERATURE: 98.4 F | HEIGHT: 65 IN | SYSTOLIC BLOOD PRESSURE: 112 MMHG

## 2019-07-11 LAB
ANION GAP SERPL CALCULATED.3IONS-SCNC: 11.3 MMOL/L (ref 5–15)
BASOPHILS # BLD AUTO: 0.08 10*3/MM3 (ref 0–0.2)
BASOPHILS NFR BLD AUTO: 1.3 % (ref 0–1.5)
BUN BLD-MCNC: 8 MG/DL (ref 6–20)
BUN/CREAT SERPL: 9.8 (ref 7–25)
CALCIUM SPEC-SCNC: 8.5 MG/DL (ref 8.6–10.5)
CHLORIDE SERPL-SCNC: 105 MMOL/L (ref 98–107)
CO2 SERPL-SCNC: 22.7 MMOL/L (ref 22–29)
CREAT BLD-MCNC: 0.82 MG/DL (ref 0.57–1)
DEPRECATED RDW RBC AUTO: 43.3 FL (ref 37–54)
EOSINOPHIL # BLD AUTO: 0.45 10*3/MM3 (ref 0–0.4)
EOSINOPHIL NFR BLD AUTO: 7.5 % (ref 0.3–6.2)
ERYTHROCYTE [DISTWIDTH] IN BLOOD BY AUTOMATED COUNT: 12.1 % (ref 12.3–15.4)
GFR SERPL CREATININE-BSD FRML MDRD: 93 ML/MIN/1.73
GLUCOSE BLD-MCNC: 140 MG/DL (ref 65–99)
HCG SERPL QL: NEGATIVE
HCT VFR BLD AUTO: 39.9 % (ref 34–46.6)
HGB BLD-MCNC: 12.7 G/DL (ref 12–15.9)
IMM GRANULOCYTES # BLD AUTO: 0.01 10*3/MM3 (ref 0–0.05)
IMM GRANULOCYTES NFR BLD AUTO: 0.2 % (ref 0–0.5)
LYMPHOCYTES # BLD AUTO: 2.34 10*3/MM3 (ref 0.7–3.1)
LYMPHOCYTES NFR BLD AUTO: 39 % (ref 19.6–45.3)
MCH RBC QN AUTO: 30.9 PG (ref 26.6–33)
MCHC RBC AUTO-ENTMCNC: 31.8 G/DL (ref 31.5–35.7)
MCV RBC AUTO: 97.1 FL (ref 79–97)
MONOCYTES # BLD AUTO: 0.43 10*3/MM3 (ref 0.1–0.9)
MONOCYTES NFR BLD AUTO: 7.2 % (ref 5–12)
NEUTROPHILS # BLD AUTO: 2.69 10*3/MM3 (ref 1.7–7)
NEUTROPHILS NFR BLD AUTO: 44.8 % (ref 42.7–76)
NRBC BLD AUTO-RTO: 0 /100 WBC (ref 0–0.2)
PLATELET # BLD AUTO: 346 10*3/MM3 (ref 140–450)
PMV BLD AUTO: 10.5 FL (ref 6–12)
POTASSIUM BLD-SCNC: 3.5 MMOL/L (ref 3.5–5.2)
RBC # BLD AUTO: 4.11 10*6/MM3 (ref 3.77–5.28)
SODIUM BLD-SCNC: 139 MMOL/L (ref 136–145)
WBC NRBC COR # BLD: 6 10*3/MM3 (ref 3.4–10.8)

## 2019-07-11 PROCEDURE — 80048 BASIC METABOLIC PNL TOTAL CA: CPT | Performed by: OBSTETRICS & GYNECOLOGY

## 2019-07-11 PROCEDURE — 93005 ELECTROCARDIOGRAM TRACING: CPT

## 2019-07-11 PROCEDURE — 84703 CHORIONIC GONADOTROPIN ASSAY: CPT | Performed by: OBSTETRICS & GYNECOLOGY

## 2019-07-11 PROCEDURE — 36415 COLL VENOUS BLD VENIPUNCTURE: CPT

## 2019-07-11 PROCEDURE — 93010 ELECTROCARDIOGRAM REPORT: CPT | Performed by: INTERNAL MEDICINE

## 2019-07-11 PROCEDURE — 85025 COMPLETE CBC W/AUTO DIFF WBC: CPT | Performed by: OBSTETRICS & GYNECOLOGY

## 2019-07-11 RX ORDER — PANTOPRAZOLE SODIUM 40 MG/1
40 TABLET, DELAYED RELEASE ORAL EVERY MORNING
COMMUNITY
End: 2019-08-11 | Stop reason: SDUPTHER

## 2019-07-11 NOTE — DISCHARGE INSTRUCTIONS
Take the following medications the morning of surgery with a small sip of water:    BREO, SPIRIVA, BLISOVI AND PROTOIX    ARRIVE AT 5:45    General Instructions:  • Do not eat solid food after midnight the night before surgery.  • You may drink clear liquids day of surgery but must stop at least one hour before your hospital arrival time.  • It is beneficial for you to have a clear drink that contains carbohydrates the day of surgery.  We suggest a 12 to 20 ounce bottle of Gatorade or Powerade for non-diabetic patients or a 12 to 20 ounce bottle of G2 or Powerade Zero for diabetic patients. (Pediatric patients, are not advised to drink a 12 to 20 ounce carbohydrate drink)    Clear liquids are liquids you can see through.  Nothing red in color.     Plain water                               Sports drinks  Sodas                                   Gelatin (Jell-O)  Fruit juices without pulp such as white grape juice and apple juice  Popsicles that contain no fruit or yogurt  Tea or coffee (no cream or milk added)  Gatorade / Powerade  G2 / Powerade Zero    • Infants may have breast milk up to four hours before surgery.  • Infants drinking formula may drink formula up to six hours before surgery.   • Patients who avoid smoking, chewing tobacco and alcohol for 4 weeks prior to surgery have a reduced risk of post-operative complications.  Quit smoking as many days before surgery as you can.  • Do not smoke, use chewing tobacco or drink alcohol the day of surgery.   • If applicable bring your C-PAP/ BI-PAP machine.  • Bring any papers given to you in the doctor’s office.  • Wear clean comfortable clothes and socks.  • Do not wear contact lenses, false eyelashes or make-up.  Bring a case for your glasses.   • Bring crutches or walker if applicable.  • Remove all piercings.  Leave jewelry and any other valuables at home.  • Hair extensions with metal clips must be removed prior to surgery.  • The Pre-Admission Testing nurse  will instruct you to bring medications if unable to obtain an accurate list in Pre-Admission Testing.        If you were given a blood bank ID arm band remember to bring it with you the day of surgery.    Preventing a Surgical Site Infection:  • For 2 to 3 days before surgery, avoid shaving with a razor because the razor can irritate skin and make it easier to develop an infection.    • Any areas of open skin can increase the risk of a post-operative wound infection by allowing bacteria to enter and travel throughout the body.  Notify your surgeon if you have any skin wounds / rashes even if it is not near the expected surgical site.  The area will need assessed to determine if surgery should be delayed until it is healed.  • The night prior to surgery sleep in a clean bed with clean clothing.  Do not allow pets to sleep with you.  • Shower on the morning of surgery using a fresh bar of anti-bacterial soap (such as Dial) and clean washcloth.  Dry with a clean towel and dress in clean clothing.  • Ask your surgeon if you will be receiving antibiotics prior to surgery.  • Make sure you, your family, and all healthcare providers clean their hands with soap and water or an alcohol based hand  before caring for you or your wound.    Day of surgery:  Upon arrival, a Pre-op nurse and Anesthesiologist will review your health history, obtain vital signs, and answer questions you may have.  The only belongings needed at this time will be a list of your home medications and if applicable your C-PAP/BI-PAP machine.  If you are staying overnight your family can leave the rest of your belongings in the car and bring them to your room later.  A Pre-op nurse will start an IV and you may receive medication in preparation for surgery, including something to help you relax.  Your family will be able to see you in the Pre-op area.  While you are in surgery your family should notify the waiting room  if they leave  the waiting room area and provide a contact phone number.    Please be aware that surgery does come with discomfort.  We want to make every effort to control your discomfort so please discuss any uncontrolled symptoms with your nurse.   Your doctor will most likely have prescribed pain medications.      If you are going home after surgery you will receive individualized written care instructions before being discharged.  A responsible adult must drive you to and from the hospital on the day of your surgery and stay with you for 24 hours.    If you are staying overnight following surgery, you will be transported to your hospital room following the recovery period.  [facility] has all private rooms.    You have received a list of surgical assistants for your reference.  If you have any questions please call Pre-Admission Testing at [phone].  Deductibles and co-payments are collected on the day of service. Please be prepared to pay the required co-pay, deductible or deposit on the day of service as defined by your plan.

## 2019-07-15 ENCOUNTER — ANESTHESIA (OUTPATIENT)
Dept: PERIOP | Facility: HOSPITAL | Age: 44
End: 2019-07-15

## 2019-07-15 ENCOUNTER — ANESTHESIA EVENT (OUTPATIENT)
Dept: PERIOP | Facility: HOSPITAL | Age: 44
End: 2019-07-15

## 2019-07-15 ENCOUNTER — HOSPITAL ENCOUNTER (OUTPATIENT)
Facility: HOSPITAL | Age: 44
Setting detail: HOSPITAL OUTPATIENT SURGERY
Discharge: HOME | End: 2019-07-15
Attending: OBSTETRICS & GYNECOLOGY | Admitting: OBSTETRICS & GYNECOLOGY

## 2019-07-15 VITALS
WEIGHT: 235.89 LBS | SYSTOLIC BLOOD PRESSURE: 114 MMHG | TEMPERATURE: 97.8 F | HEIGHT: 66 IN | DIASTOLIC BLOOD PRESSURE: 65 MMHG | HEART RATE: 63 BPM | RESPIRATION RATE: 16 BRPM | BODY MASS INDEX: 37.91 KG/M2 | OXYGEN SATURATION: 95 %

## 2019-07-15 PROCEDURE — 25010000002 SUCCINYLCHOLINE PER 20 MG: Performed by: NURSE ANESTHETIST, CERTIFIED REGISTERED

## 2019-07-15 PROCEDURE — 25010000002 HYDROMORPHONE PER 4 MG: Performed by: ANESTHESIOLOGY

## 2019-07-15 PROCEDURE — 25010000002 FENTANYL CITRATE (PF) 100 MCG/2ML SOLUTION: Performed by: NURSE ANESTHETIST, CERTIFIED REGISTERED

## 2019-07-15 PROCEDURE — 25010000002 KETOROLAC TROMETHAMINE PER 15 MG: Performed by: NURSE ANESTHETIST, CERTIFIED REGISTERED

## 2019-07-15 PROCEDURE — 25010000002 FENTANYL CITRATE (PF) 100 MCG/2ML SOLUTION: Performed by: ANESTHESIOLOGY

## 2019-07-15 PROCEDURE — 25010000002 PROPOFOL 10 MG/ML EMULSION: Performed by: NURSE ANESTHETIST, CERTIFIED REGISTERED

## 2019-07-15 PROCEDURE — 25010000002 ONDANSETRON PER 1 MG: Performed by: NURSE ANESTHETIST, CERTIFIED REGISTERED

## 2019-07-15 PROCEDURE — 25010000002 DEXAMETHASONE PER 1 MG: Performed by: NURSE ANESTHETIST, CERTIFIED REGISTERED

## 2019-07-15 DEVICE — CLIP FALLOP FILSHIE TI PR STRL BX/20: Type: IMPLANTABLE DEVICE | Site: FALLOPIAN TUBE | Status: FUNCTIONAL

## 2019-07-15 RX ORDER — OXYCODONE HYDROCHLORIDE AND ACETAMINOPHEN 5; 325 MG/1; MG/1
1-2 TABLET ORAL EVERY 6 HOURS PRN
Qty: 24 TABLET | Refills: 0 | Status: SHIPPED | OUTPATIENT
Start: 2019-07-15 | End: 2020-08-24

## 2019-07-15 RX ORDER — FENTANYL CITRATE 50 UG/ML
INJECTION, SOLUTION INTRAMUSCULAR; INTRAVENOUS AS NEEDED
Status: DISCONTINUED | OUTPATIENT
Start: 2019-07-15 | End: 2019-07-15 | Stop reason: SURG

## 2019-07-15 RX ORDER — SODIUM CHLORIDE, SODIUM LACTATE, POTASSIUM CHLORIDE, CALCIUM CHLORIDE 600; 310; 30; 20 MG/100ML; MG/100ML; MG/100ML; MG/100ML
9 INJECTION, SOLUTION INTRAVENOUS CONTINUOUS
Status: DISCONTINUED | OUTPATIENT
Start: 2019-07-15 | End: 2019-07-15 | Stop reason: HOSPADM

## 2019-07-15 RX ORDER — DEXAMETHASONE SODIUM PHOSPHATE 10 MG/ML
INJECTION INTRAMUSCULAR; INTRAVENOUS AS NEEDED
Status: DISCONTINUED | OUTPATIENT
Start: 2019-07-15 | End: 2019-07-15 | Stop reason: SURG

## 2019-07-15 RX ORDER — ONDANSETRON 2 MG/ML
INJECTION INTRAMUSCULAR; INTRAVENOUS AS NEEDED
Status: DISCONTINUED | OUTPATIENT
Start: 2019-07-15 | End: 2019-07-15 | Stop reason: SURG

## 2019-07-15 RX ORDER — BUPIVACAINE HYDROCHLORIDE 5 MG/ML
INJECTION, SOLUTION EPIDURAL; INTRACAUDAL AS NEEDED
Status: DISCONTINUED | OUTPATIENT
Start: 2019-07-15 | End: 2019-07-15 | Stop reason: HOSPADM

## 2019-07-15 RX ORDER — MIDAZOLAM HYDROCHLORIDE 1 MG/ML
1 INJECTION INTRAMUSCULAR; INTRAVENOUS
Status: DISCONTINUED | OUTPATIENT
Start: 2019-07-15 | End: 2019-07-15 | Stop reason: HOSPADM

## 2019-07-15 RX ORDER — MAGNESIUM HYDROXIDE 1200 MG/15ML
LIQUID ORAL AS NEEDED
Status: DISCONTINUED | OUTPATIENT
Start: 2019-07-15 | End: 2019-07-15 | Stop reason: HOSPADM

## 2019-07-15 RX ORDER — LIDOCAINE HYDROCHLORIDE 10 MG/ML
0.5 INJECTION, SOLUTION EPIDURAL; INFILTRATION; INTRACAUDAL; PERINEURAL ONCE AS NEEDED
Status: DISCONTINUED | OUTPATIENT
Start: 2019-07-15 | End: 2019-07-15 | Stop reason: HOSPADM

## 2019-07-15 RX ORDER — PROMETHAZINE HYDROCHLORIDE 25 MG/1
25 SUPPOSITORY RECTAL ONCE AS NEEDED
Status: DISCONTINUED | OUTPATIENT
Start: 2019-07-15 | End: 2019-07-15 | Stop reason: HOSPADM

## 2019-07-15 RX ORDER — PROMETHAZINE HYDROCHLORIDE 25 MG/ML
6.25 INJECTION, SOLUTION INTRAMUSCULAR; INTRAVENOUS ONCE AS NEEDED
Status: DISCONTINUED | OUTPATIENT
Start: 2019-07-15 | End: 2019-07-15 | Stop reason: HOSPADM

## 2019-07-15 RX ORDER — ROCURONIUM BROMIDE 10 MG/ML
INJECTION, SOLUTION INTRAVENOUS AS NEEDED
Status: DISCONTINUED | OUTPATIENT
Start: 2019-07-15 | End: 2019-07-15 | Stop reason: SURG

## 2019-07-15 RX ORDER — HYDROCODONE BITARTRATE AND ACETAMINOPHEN 5; 325 MG/1; MG/1
1 TABLET ORAL ONCE AS NEEDED
Status: DISCONTINUED | OUTPATIENT
Start: 2019-07-15 | End: 2019-07-15 | Stop reason: HOSPADM

## 2019-07-15 RX ORDER — ACETAMINOPHEN 500 MG
500 TABLET ORAL ONCE
Status: COMPLETED | OUTPATIENT
Start: 2019-07-15 | End: 2019-07-15

## 2019-07-15 RX ORDER — PROMETHAZINE HYDROCHLORIDE 25 MG/1
25 TABLET ORAL ONCE AS NEEDED
Status: DISCONTINUED | OUTPATIENT
Start: 2019-07-15 | End: 2019-07-15 | Stop reason: HOSPADM

## 2019-07-15 RX ORDER — HYDRALAZINE HYDROCHLORIDE 20 MG/ML
5 INJECTION INTRAMUSCULAR; INTRAVENOUS
Status: DISCONTINUED | OUTPATIENT
Start: 2019-07-15 | End: 2019-07-15 | Stop reason: HOSPADM

## 2019-07-15 RX ORDER — MIDAZOLAM HYDROCHLORIDE 1 MG/ML
2 INJECTION INTRAMUSCULAR; INTRAVENOUS
Status: DISCONTINUED | OUTPATIENT
Start: 2019-07-15 | End: 2019-07-15 | Stop reason: HOSPADM

## 2019-07-15 RX ORDER — OXYCODONE HYDROCHLORIDE AND ACETAMINOPHEN 5; 325 MG/1; MG/1
1 TABLET ORAL ONCE AS NEEDED
Status: DISCONTINUED | OUTPATIENT
Start: 2019-07-15 | End: 2019-07-15 | Stop reason: HOSPADM

## 2019-07-15 RX ORDER — SUCCINYLCHOLINE CHLORIDE 20 MG/ML
INJECTION INTRAMUSCULAR; INTRAVENOUS AS NEEDED
Status: DISCONTINUED | OUTPATIENT
Start: 2019-07-15 | End: 2019-07-15 | Stop reason: SURG

## 2019-07-15 RX ORDER — HYDROMORPHONE HYDROCHLORIDE 1 MG/ML
0.25 INJECTION, SOLUTION INTRAMUSCULAR; INTRAVENOUS; SUBCUTANEOUS
Status: DISCONTINUED | OUTPATIENT
Start: 2019-07-15 | End: 2019-07-15 | Stop reason: HOSPADM

## 2019-07-15 RX ORDER — DIPHENHYDRAMINE HYDROCHLORIDE 50 MG/ML
12.5 INJECTION INTRAMUSCULAR; INTRAVENOUS
Status: DISCONTINUED | OUTPATIENT
Start: 2019-07-15 | End: 2019-07-15 | Stop reason: HOSPADM

## 2019-07-15 RX ORDER — SODIUM CHLORIDE 0.9 % (FLUSH) 0.9 %
3 SYRINGE (ML) INJECTION EVERY 12 HOURS SCHEDULED
Status: DISCONTINUED | OUTPATIENT
Start: 2019-07-15 | End: 2019-07-15 | Stop reason: HOSPADM

## 2019-07-15 RX ORDER — ACETAMINOPHEN 325 MG/1
650 TABLET ORAL ONCE AS NEEDED
Status: DISCONTINUED | OUTPATIENT
Start: 2019-07-15 | End: 2019-07-15 | Stop reason: HOSPADM

## 2019-07-15 RX ORDER — NALBUPHINE HCL 10 MG/ML
10 AMPUL (ML) INJECTION EVERY 4 HOURS PRN
Status: DISCONTINUED | OUTPATIENT
Start: 2019-07-15 | End: 2019-07-15 | Stop reason: HOSPADM

## 2019-07-15 RX ORDER — KETOROLAC TROMETHAMINE 30 MG/ML
INJECTION, SOLUTION INTRAMUSCULAR; INTRAVENOUS AS NEEDED
Status: DISCONTINUED | OUTPATIENT
Start: 2019-07-15 | End: 2019-07-15 | Stop reason: SURG

## 2019-07-15 RX ORDER — ACETAMINOPHEN 650 MG/1
650 SUPPOSITORY RECTAL ONCE AS NEEDED
Status: DISCONTINUED | OUTPATIENT
Start: 2019-07-15 | End: 2019-07-15 | Stop reason: HOSPADM

## 2019-07-15 RX ORDER — SODIUM CHLORIDE 0.9 % (FLUSH) 0.9 %
1-10 SYRINGE (ML) INJECTION AS NEEDED
Status: DISCONTINUED | OUTPATIENT
Start: 2019-07-15 | End: 2019-07-15 | Stop reason: HOSPADM

## 2019-07-15 RX ORDER — PROPOFOL 10 MG/ML
VIAL (ML) INTRAVENOUS AS NEEDED
Status: DISCONTINUED | OUTPATIENT
Start: 2019-07-15 | End: 2019-07-15 | Stop reason: SURG

## 2019-07-15 RX ORDER — FENTANYL CITRATE 50 UG/ML
50 INJECTION, SOLUTION INTRAMUSCULAR; INTRAVENOUS
Status: DISCONTINUED | OUTPATIENT
Start: 2019-07-15 | End: 2019-07-15 | Stop reason: HOSPADM

## 2019-07-15 RX ORDER — NALBUPHINE HCL 10 MG/ML
2 AMPUL (ML) INJECTION EVERY 4 HOURS PRN
Status: DISCONTINUED | OUTPATIENT
Start: 2019-07-15 | End: 2019-07-15 | Stop reason: HOSPADM

## 2019-07-15 RX ORDER — NALOXONE HCL 0.4 MG/ML
0.4 VIAL (ML) INJECTION AS NEEDED
Status: DISCONTINUED | OUTPATIENT
Start: 2019-07-15 | End: 2019-07-15 | Stop reason: HOSPADM

## 2019-07-15 RX ORDER — IBUPROFEN 600 MG/1
600 TABLET ORAL EVERY 6 HOURS PRN
Status: DISCONTINUED | OUTPATIENT
Start: 2019-07-15 | End: 2019-07-15 | Stop reason: HOSPADM

## 2019-07-15 RX ADMIN — OXYCODONE AND ACETAMINOPHEN 1 TABLET: 5; 325 TABLET ORAL at 08:58

## 2019-07-15 RX ADMIN — HYDROMORPHONE HYDROCHLORIDE 0.25 MG: 1 INJECTION, SOLUTION INTRAMUSCULAR; INTRAVENOUS; SUBCUTANEOUS at 08:36

## 2019-07-15 RX ADMIN — SUGAMMADEX 200 MG: 100 INJECTION, SOLUTION INTRAVENOUS at 07:58

## 2019-07-15 RX ADMIN — DEXAMETHASONE SODIUM PHOSPHATE 8 MG: 10 INJECTION INTRAMUSCULAR; INTRAVENOUS at 07:40

## 2019-07-15 RX ADMIN — FENTANYL CITRATE 50 MCG: 50 INJECTION, SOLUTION INTRAMUSCULAR; INTRAVENOUS at 08:58

## 2019-07-15 RX ADMIN — ACETAMINOPHEN 500 MG: 500 TABLET, FILM COATED ORAL at 07:09

## 2019-07-15 RX ADMIN — PROPOFOL 200 MG: 10 INJECTION, EMULSION INTRAVENOUS at 07:27

## 2019-07-15 RX ADMIN — ROCURONIUM BROMIDE 20 MG: 10 INJECTION, SOLUTION INTRAVENOUS at 07:35

## 2019-07-15 RX ADMIN — HYDROMORPHONE HYDROCHLORIDE 0.25 MG: 1 INJECTION, SOLUTION INTRAMUSCULAR; INTRAVENOUS; SUBCUTANEOUS at 08:31

## 2019-07-15 RX ADMIN — ROCURONIUM BROMIDE 10 MG: 10 INJECTION, SOLUTION INTRAVENOUS at 07:27

## 2019-07-15 RX ADMIN — FENTANYL CITRATE 50 MCG: 50 INJECTION, SOLUTION INTRAMUSCULAR; INTRAVENOUS at 08:30

## 2019-07-15 RX ADMIN — SODIUM CHLORIDE, POTASSIUM CHLORIDE, SODIUM LACTATE AND CALCIUM CHLORIDE 9 ML/HR: 600; 310; 30; 20 INJECTION, SOLUTION INTRAVENOUS at 07:12

## 2019-07-15 RX ADMIN — KETOROLAC TROMETHAMINE 30 MG: 30 INJECTION, SOLUTION INTRAMUSCULAR; INTRAVENOUS at 08:00

## 2019-07-15 RX ADMIN — ONDANSETRON 4 MG: 2 INJECTION INTRAMUSCULAR; INTRAVENOUS at 07:56

## 2019-07-15 RX ADMIN — FENTANYL CITRATE 100 MCG: 50 INJECTION INTRAMUSCULAR; INTRAVENOUS at 07:27

## 2019-07-15 RX ADMIN — FENTANYL CITRATE 50 MCG: 50 INJECTION INTRAMUSCULAR; INTRAVENOUS at 07:50

## 2019-07-15 RX ADMIN — SUCCINYLCHOLINE CHLORIDE 140 MG: 20 INJECTION, SOLUTION INTRAMUSCULAR; INTRAVENOUS at 07:27

## 2019-07-15 RX ADMIN — FENTANYL CITRATE 50 MCG: 50 INJECTION INTRAMUSCULAR; INTRAVENOUS at 07:54

## 2019-07-15 NOTE — ANESTHESIA PROCEDURE NOTES
Airway  Urgency: elective    Airway not difficult    General Information and Staff    Patient location during procedure: OR  Anesthesiologist: Mervin Hawkins MD  CRNA: Ellie Guevara CRNA    Indications and Patient Condition  Indications for airway management: airway protection    Preoxygenated: yes  MILS not maintained throughout  Mask difficulty assessment: 1 - vent by mask    Final Airway Details  Final airway type: endotracheal airway      Successful airway: ETT  Cuffed: yes   Successful intubation technique: direct laryngoscopy  Facilitating devices/methods: cricoid pressure  Endotracheal tube insertion site: oral  Blade: Loan  Cormack-Lehane Classification: grade I - full view of glottis  Placement verified by: chest auscultation and capnometry   Measured from: lips  Number of attempts at approach: 1    Additional Comments  Dentition intact and unchanged. CBEBS.  +ETCO2.

## 2019-07-15 NOTE — OP NOTE
LAPAROSCOPIC TUBAL STERILIZATION      DATE OF OPERATION:  7/15/19    PREOPERATIVE DIAGNOSIS: Desires tubal sterilization.     POSTOPERATIVE DIAGNOSIS: Same.     PROCEDURE(S) PERFORMED: Laparoscopic tubal sterilization with Filshie clips.     SURGEON(S): Dr. Jorgito Hallman.     ASSISTANT(S): Toufante PAS     ANESTHESIA: General.     ESTIMATED BLOOD LOSS: Less than 10 milliliters.     COMPLICATIONS: None.     FINDING(S): Normal fallopian tubes.  Uterus with subserosal and intramural fibroids.  Approximately 1 to 2 cm left fundal subserosal fibroid and to posterior fibroids approximately 2 to 3 cm in diameter on the fundus and lower segment of the posterior uterus.    DESCRIPTION OF PROCEDURE(S): Patient was taken to the Operating Room after appropriate general anesthesia in the dorsal lithotomy position. The open Graves speculum was placed. The exam under anesthesia did show a normal anterior uterus. The Hulka intrauterine manipulator was placed and the bladder catheterized.   After appropriate prep and drape of the abdomen, a 1 centimeter subumbilical incision was made. The Veress needle was inserted. Approximately 2.2 liters of carbon dioxide infused to an intraabdominal pressure of 20 millimeters of Mercury. The disposable #5 trocar was inserted. The pelvis was visualized. The 8 millimeter suprapubic trocar was then inserted under direct visualization. No trauma to the abdomen was noted. The pelvis was then visualized. The uterus, tubes and ovaries were normal. Cul-de-sac normal.  The Filshie clip was then placed on a proximal portion of each fallopian tube and noted to encircle the entire tube. The suprapubic trocar was then removed, no bleeding noted, and the 5 millimeter trocar removed. Then, the incisions infiltrated with 8 milliliters of 0.25% Marcaine plain and closed with interrupted 4-0 Vicryl stitch. The anterior lip of the cervix was observed after removing the Hulka intrauterine manipulator and hemostasis  noted after silver nitrate and Bovie cautery. Patient tolerated the procedure well.     Jorgito Hallman MD  7/15/2019  8:19 AM

## 2019-07-15 NOTE — ANESTHESIA PREPROCEDURE EVALUATION
Anesthesia Evaluation     NPO Solid Status: > 8 hours             Airway   Mallampati: II  TM distance: >3 FB  Neck ROM: full  No difficulty expected  Dental - normal exam     Pulmonary - normal exam   (+) asthma,   (-) not a smoker  Cardiovascular - negative cardio ROS    Rhythm: regular        Neuro/Psych- negative ROS  GI/Hepatic/Renal/Endo    (+) morbid obesity, GERD,      Musculoskeletal     (+) neck pain,   Abdominal   (+) obese,    Substance History      OB/GYN          Other                        Anesthesia Plan    ASA 3     general   (  D/W R&B of GA including but not limited to: heart, lung, liver, kidney, neurologic problems, positioning injuries, dental damage, corneal abrasion and TMJ.  .)  intravenous induction

## 2019-07-15 NOTE — ANESTHESIA POSTPROCEDURE EVALUATION
"Patient: Vianney Villarreal    Procedure Summary     Date:  07/15/19 Room / Location:   [facility]  [facility]    Anesthesia Start:  0723 Anesthesia Stop:  0826    Procedure:  LAPAROSCOPIC BILATERAL TUBAL LIGATION WITH FILSHIE CLIPS (Bilateral Abdomen) Diagnosis:      Surgeon:  Jorgito Hallman MD Provider:  Mervin Hawkins MD    Anesthesia Type:  general ASA Status:  3          Anesthesia Type: general  Last vitals  BP   114/65 (07/15/19 0942)   Temp   36.6 °C (97.8 °F) (07/15/19 0925)   Pulse   63 (07/15/19 0942)   Resp   16 (07/15/19 0942)     SpO2   95 % (07/15/19 0942)     Post Anesthesia Care and Evaluation    Patient location during evaluation: bedside  Patient participation: complete - patient participated  Level of consciousness: awake  Pain score: 2  Pain management: adequate  Airway patency: patent  Anesthetic complications: No anesthetic complications  PONV Status: none  Cardiovascular status: acceptable  Respiratory status: acceptable  Hydration status: acceptable    Comments: /65 (BP Location: Right arm, Patient Position: Lying)   Pulse 63   Temp 36.6 °C (97.8 °F) (Temporal)   Resp 16   Ht 167.6 cm (66\")   Wt 107 kg (235 lb 14.3 oz)   LMP 07/10/2019   SpO2 95%   BMI 38.07 kg/m²         "

## 2019-07-15 NOTE — H&P
[facility]ISTORY [facility] PHYS[facility]        Patient Name:  Vianney Villarreal    :  1975    Medical Record:  [id number]    Primary Care Physician    Jorgito Hallman M.D.    History    The patient presents for tubal sterilization.  She has been counseled concerning the permanency of this procedure as well as the risk of failure of 1 300.  She is aware of alternative methods of contraception.    Review of Systems    Noncontributory.    Past Medical History    Past Medical History:   Diagnosis Date   • Acid reflux    • Asthma        Past Surgical History    Past Surgical History:   Procedure Laterality Date   • ENDOSCOPY     • ESOPHAGEAL DILATATION         Family History    Family History   Problem Relation Age of Onset   • No Known Problems Mother    • No Known Problems Father    • Malig Hyperthermia Neg Hx        Social History    Social History     Tobacco Use   • Smoking status: Never Smoker   • Smokeless tobacco: Never Used   Substance Use Topics   • Alcohol use: No       Current Medications    [unfilled]    Allergies     No Known Allergies    Physical Exam    tMax 24 hrs:  Temp (24hrs), Av.7 °F (36.5 °C), Min:97.7 °F (36.5 °C), Max:97.7 °F (36.5 °C)    Vitals Ranges:  Temp:  [97.7 °F (36.5 °C)] 97.7 °F (36.5 °C)  Heart Rate:  [71] 71  Resp:  [16] 16  BP: (110)/(46) 110/46    Constitutional:  Well developed, well nourished, no acute distress, non-toxic appearance   Respiratory:  No respiratory distress, normal breath sounds, no rales, no wheezing   Cardiovascular:  Normal rate, normal rhythm, no murmurs, no gallops, no rubs   GI:  Soft, nondistended, normal bowel sounds, nontender, no organomegaly, no mass, no rebound, no guarding   :  No costovertebral angle tenderness. Normal female.   Extremities: Within normal limits.   Neurologic: Within normal limits.       labs    Lab Results   Component Value Date    WBC 6.00 2019    HGB 12.7 2019    HCT 39.9 2019    MCV 97.1 (H) 2019      07/11/2019       IMAGING & OTHER STUDIES    Results reviewed. See reports.   [unfilled]      Assessment      * No active hospital problems. *      Desires tubal sterilization    Plan    See orders. The plan was discussed with the patient and/or family.    Laparoscopic tubal sterilization with Filshie clips      Jorgito Hallman MD, 7/15/2019 7:22 AM

## 2019-08-01 ENCOUNTER — TELEPHONE (OUTPATIENT)
Dept: FAMILY MEDICINE CLINIC | Facility: CLINIC | Age: 44
End: 2019-08-01

## 2019-08-01 NOTE — TELEPHONE ENCOUNTER
PATIENT HAD SEEN DR. CHOUDHURY BACK IN MARCH  AND DR. CHOUDHURY TOLD PATIENT THAT SHE SHOULD  TO GET HER TUBES TIED SINCE SHE HAS BEEN ON THE PILL FOR OVER 20 YEARS AND HER BONES WAS ACHING. PATIENT SEEN DR. LOPEZ AND HAD HER TUBES TIED. PATIENT CAN'T GET PAID FROM WORK WHILE SHE WAS OFF FOR THE PROCEDURE BECAUSE PATIENTS INSURANCE  IS  SAYING IT WASN'T MEDICALLY NECESSARY. CAN DR. HANNA WRITE A LETTER STATING GETTING TUBES TIED WAS MEDICALLY NECESSARY AND FAX THE PAPER TO DR. LOPEZ

## 2019-08-03 NOTE — TELEPHONE ENCOUNTER
We can work on it Monday. Include risk factors of bcp due to age and sx of arthralgia.  I'll ask Leif what else to include.

## 2019-08-12 RX ORDER — PANTOPRAZOLE SODIUM 40 MG/1
TABLET, DELAYED RELEASE ORAL
Qty: 90 TABLET | Refills: 2 | Status: SHIPPED | OUTPATIENT
Start: 2019-08-12 | End: 2020-05-08

## 2019-09-18 RX ORDER — ALBUTEROL SULFATE 90 UG/1
AEROSOL, METERED RESPIRATORY (INHALATION)
Qty: 18 G | Refills: 0 | Status: SHIPPED | OUTPATIENT
Start: 2019-09-18 | End: 2021-04-13

## 2020-05-08 RX ORDER — PANTOPRAZOLE SODIUM 40 MG/1
TABLET, DELAYED RELEASE ORAL
Qty: 90 TABLET | Refills: 3 | Status: SHIPPED | OUTPATIENT
Start: 2020-05-08 | End: 2021-05-03

## 2020-08-11 ENCOUNTER — OFFICE VISIT (OUTPATIENT)
Dept: FAMILY MEDICINE CLINIC | Facility: CLINIC | Age: 45
End: 2020-08-11

## 2020-08-11 VITALS
OXYGEN SATURATION: 99 % | WEIGHT: 250.4 LBS | SYSTOLIC BLOOD PRESSURE: 100 MMHG | HEIGHT: 65 IN | HEART RATE: 70 BPM | DIASTOLIC BLOOD PRESSURE: 60 MMHG | BODY MASS INDEX: 41.72 KG/M2 | TEMPERATURE: 97.8 F

## 2020-08-11 DIAGNOSIS — M25.50 POLYARTHRALGIA: ICD-10-CM

## 2020-08-11 DIAGNOSIS — K21.00 GASTROESOPHAGEAL REFLUX DISEASE WITH ESOPHAGITIS: ICD-10-CM

## 2020-08-11 DIAGNOSIS — E66.01 MORBID (SEVERE) OBESITY DUE TO EXCESS CALORIES (HCC): ICD-10-CM

## 2020-08-11 DIAGNOSIS — R20.2 NUMBNESS AND TINGLING: Primary | ICD-10-CM

## 2020-08-11 DIAGNOSIS — J45.909 ASTHMA, UNSPECIFIED ASTHMA SEVERITY, UNSPECIFIED WHETHER COMPLICATED, UNSPECIFIED WHETHER PERSISTENT: ICD-10-CM

## 2020-08-11 DIAGNOSIS — R20.0 NUMBNESS AND TINGLING: Primary | ICD-10-CM

## 2020-08-11 LAB
ALBUMIN SERPL-MCNC: 4.2 G/DL (ref 3.5–5.2)
ALBUMIN/GLOB SERPL: 1.4 G/DL
ALP SERPL-CCNC: 78 U/L (ref 39–117)
ALT SERPL W P-5'-P-CCNC: 53 U/L (ref 1–33)
ANION GAP SERPL CALCULATED.3IONS-SCNC: 9.3 MMOL/L (ref 5–15)
AST SERPL-CCNC: 45 U/L (ref 1–32)
BACTERIA UR QL AUTO: NORMAL /HPF
BILIRUB SERPL-MCNC: 0.9 MG/DL (ref 0–1.2)
BILIRUB UR QL STRIP: NEGATIVE
BUN SERPL-MCNC: 7 MG/DL (ref 6–20)
BUN/CREAT SERPL: 9.6 (ref 7–25)
CALCIUM SPEC-SCNC: 9.4 MG/DL (ref 8.6–10.5)
CHLORIDE SERPL-SCNC: 102 MMOL/L (ref 98–107)
CHOLEST SERPL-MCNC: 199 MG/DL (ref 0–200)
CHROMATIN AB SERPL-ACNC: <10 IU/ML (ref 0–14)
CLARITY UR: CLEAR
CO2 SERPL-SCNC: 25.7 MMOL/L (ref 22–29)
COLOR UR: YELLOW
CREAT SERPL-MCNC: 0.73 MG/DL (ref 0.57–1)
CRP SERPL-MCNC: 0.38 MG/DL (ref 0–0.5)
ERYTHROCYTE [DISTWIDTH] IN BLOOD BY AUTOMATED COUNT: 12.6 % (ref 12.3–15.4)
ERYTHROCYTE [SEDIMENTATION RATE] IN BLOOD: 6 MM/HR (ref 0–20)
GFR SERPL CREATININE-BSD FRML MDRD: 105 ML/MIN/1.73
GLOBULIN UR ELPH-MCNC: 3.1 GM/DL
GLUCOSE SERPL-MCNC: 83 MG/DL (ref 65–99)
GLUCOSE UR STRIP-MCNC: NEGATIVE MG/DL
HBA1C MFR BLD: 5.2 % (ref 4.8–5.6)
HCT VFR BLD AUTO: 42.9 % (ref 34–46.6)
HDLC SERPL-MCNC: 61 MG/DL (ref 40–60)
HGB BLD-MCNC: 13.7 G/DL (ref 12–15.9)
HGB UR QL STRIP.AUTO: NEGATIVE
KETONES UR QL STRIP: NEGATIVE
LDLC SERPL CALC-MCNC: 109 MG/DL (ref 0–100)
LDLC/HDLC SERPL: 1.79 {RATIO}
LEUKOCYTE ESTERASE UR QL STRIP.AUTO: NEGATIVE
LYMPHOCYTES # BLD AUTO: 2.8 10*3/MM3 (ref 0.7–3.1)
LYMPHOCYTES NFR BLD AUTO: 50.1 % (ref 19.6–45.3)
MCH RBC QN AUTO: 29.4 PG (ref 26.6–33)
MCHC RBC AUTO-ENTMCNC: 31.9 G/DL (ref 31.5–35.7)
MCV RBC AUTO: 92 FL (ref 79–97)
MONOCYTES # BLD AUTO: 0.5 10*3/MM3 (ref 0.1–0.9)
MONOCYTES NFR BLD AUTO: 9.3 % (ref 5–12)
NEUTROPHILS NFR BLD AUTO: 2.3 10*3/MM3 (ref 1.7–7)
NEUTROPHILS NFR BLD AUTO: 40.6 % (ref 42.7–76)
NITRITE UR QL STRIP: NEGATIVE
PH UR STRIP.AUTO: 7.5 [PH] (ref 4.6–8)
PLATELET # BLD AUTO: 382 10*3/MM3 (ref 140–450)
PMV BLD AUTO: 7.6 FL (ref 6–12)
POTASSIUM SERPL-SCNC: 3.6 MMOL/L (ref 3.5–5.2)
PROT SERPL-MCNC: 7.3 G/DL (ref 6–8.5)
PROT UR QL STRIP: NEGATIVE
RBC # BLD AUTO: 4.66 10*6/MM3 (ref 3.77–5.28)
RBC # UR: NORMAL /HPF
REF LAB TEST METHOD: NORMAL
SODIUM SERPL-SCNC: 137 MMOL/L (ref 136–145)
SP GR UR STRIP: 1.01 (ref 1–1.03)
SQUAMOUS #/AREA URNS HPF: NORMAL /HPF
TRIGL SERPL-MCNC: 144 MG/DL (ref 0–150)
URATE SERPL-MCNC: 4.9 MG/DL (ref 2.4–5.7)
UROBILINOGEN UR QL STRIP: NORMAL
VLDLC SERPL-MCNC: 28.8 MG/DL (ref 5–40)
WBC # BLD AUTO: 5.6 10*3/MM3 (ref 3.4–10.8)
WBC UR QL AUTO: NORMAL /HPF

## 2020-08-11 PROCEDURE — 81001 URINALYSIS AUTO W/SCOPE: CPT | Performed by: FAMILY MEDICINE

## 2020-08-11 PROCEDURE — 84550 ASSAY OF BLOOD/URIC ACID: CPT | Performed by: FAMILY MEDICINE

## 2020-08-11 PROCEDURE — 83036 HEMOGLOBIN GLYCOSYLATED A1C: CPT | Performed by: FAMILY MEDICINE

## 2020-08-11 PROCEDURE — 80061 LIPID PANEL: CPT | Performed by: FAMILY MEDICINE

## 2020-08-11 PROCEDURE — 86431 RHEUMATOID FACTOR QUANT: CPT | Performed by: FAMILY MEDICINE

## 2020-08-11 PROCEDURE — 99214 OFFICE O/P EST MOD 30 MIN: CPT | Performed by: FAMILY MEDICINE

## 2020-08-11 PROCEDURE — 80053 COMPREHEN METABOLIC PANEL: CPT | Performed by: FAMILY MEDICINE

## 2020-08-11 PROCEDURE — 86140 C-REACTIVE PROTEIN: CPT | Performed by: FAMILY MEDICINE

## 2020-08-11 PROCEDURE — 85025 COMPLETE CBC W/AUTO DIFF WBC: CPT | Performed by: FAMILY MEDICINE

## 2020-08-11 PROCEDURE — 85652 RBC SED RATE AUTOMATED: CPT | Performed by: FAMILY MEDICINE

## 2020-08-11 RX ORDER — EPINEPHRINE 0.3 MG/.3ML
0.3 INJECTION INTRAMUSCULAR TAKE AS DIRECTED
Qty: 1 EACH | Refills: 2 | Status: SHIPPED | OUTPATIENT
Start: 2020-08-11

## 2020-08-11 RX ORDER — CETIRIZINE HYDROCHLORIDE 5 MG/1
5 TABLET ORAL NIGHTLY
COMMUNITY

## 2020-08-11 NOTE — PROGRESS NOTES
SUBJECTIVE:  The patient is a 45-year-old -American female.  She is here today because of numbness in her legs feet and hands.  She also has GERD.  She has tried numerous PPIs.  None seem to work.  Protonix has not worked since he is doubled up on it.    PAST MEDICAL HISTORY:  Reviewed.    REVIEW OF SYSTEMS:  Please see above; all others reviewed and are negative.      OBJECTIVE:   Vitals signs are reviewed and are stable.    General:  Well-nourished.  Alert and oriented x3 in no acute distress.  HEENT: PERRLA.   Neck:  Supple.  Minimal tenderness mid lower posterior C-spine.  Lungs:  Clear.    Heart:  Regular rate and rhythm.   Abdomen:   Soft, nontender.   Extremities:  No cyanosis, clubbing or edema.  There is present over the shoulders biceps knees and ankles.  Neurological:  Grossly intact without motor or sensory deficits.  DT reflexes symmetrical    ASSESSMENT:    Polyarthralgia  Neuropathy  Obesity  GERD  PLAN: CBC CMP fasting lipids arthritis profile TSH ordered.  Patient be referred to GI for EGD.  She will follow-up on labs.  She will call if problems.

## 2020-08-12 LAB — ANA SER QL: NEGATIVE

## 2020-08-13 DIAGNOSIS — R79.89 ELEVATED LFTS: Primary | ICD-10-CM

## 2020-08-13 DIAGNOSIS — R20.2 NUMBNESS AND TINGLING: ICD-10-CM

## 2020-08-13 DIAGNOSIS — R20.0 NUMBNESS AND TINGLING: ICD-10-CM

## 2020-08-18 ENCOUNTER — HOSPITAL ENCOUNTER (OUTPATIENT)
Dept: ULTRASOUND IMAGING | Facility: HOSPITAL | Age: 45
Discharge: HOME | End: 2020-08-18
Admitting: FAMILY MEDICINE

## 2020-08-18 DIAGNOSIS — R79.89 ELEVATED LFTS: ICD-10-CM

## 2020-08-18 DIAGNOSIS — K82.4 GALLBLADDER POLYP: Primary | ICD-10-CM

## 2020-08-18 DIAGNOSIS — K80.20 CALCULUS OF GALLBLADDER WITHOUT CHOLECYSTITIS WITHOUT OBSTRUCTION: ICD-10-CM

## 2020-08-18 PROCEDURE — 76705 ECHO EXAM OF ABDOMEN: CPT

## 2020-08-19 ENCOUNTER — OFFICE VISIT (OUTPATIENT)
Dept: FAMILY MEDICINE CLINIC | Facility: CLINIC | Age: 45
End: 2020-08-19

## 2020-08-19 ENCOUNTER — HOSPITAL ENCOUNTER (EMERGENCY)
Facility: HOSPITAL | Age: 45
Discharge: LEFT BEFORE DISCHARGE | End: 2020-08-19

## 2020-08-19 VITALS
HEART RATE: 81 BPM | TEMPERATURE: 98 F | WEIGHT: 244.4 LBS | BODY MASS INDEX: 40.72 KG/M2 | DIASTOLIC BLOOD PRESSURE: 60 MMHG | OXYGEN SATURATION: 98 % | SYSTOLIC BLOOD PRESSURE: 110 MMHG | HEIGHT: 65 IN

## 2020-08-19 VITALS
OXYGEN SATURATION: 99 % | RESPIRATION RATE: 16 BRPM | BODY MASS INDEX: 40.67 KG/M2 | TEMPERATURE: 98 F | HEIGHT: 65 IN | HEART RATE: 104 BPM

## 2020-08-19 DIAGNOSIS — R20.2 NUMBNESS AND TINGLING: Primary | ICD-10-CM

## 2020-08-19 DIAGNOSIS — R20.0 NUMBNESS AND TINGLING: Primary | ICD-10-CM

## 2020-08-19 PROCEDURE — 99213 OFFICE O/P EST LOW 20 MIN: CPT | Performed by: FAMILY MEDICINE

## 2020-08-19 RX ORDER — GABAPENTIN 300 MG/1
CAPSULE ORAL
Qty: 90 CAPSULE | Refills: 0 | Status: SHIPPED | OUTPATIENT
Start: 2020-08-19 | End: 2020-09-08 | Stop reason: SINTOL

## 2020-08-19 NOTE — PROGRESS NOTES
"SUBJECTIVE:  The patient is a 45-year-old female.  She is here for follow-up.  I saw her for numbness and tingling.  She has an appointment with a neurologist.  I have scheduled an EMG however this has not been performed yet.  She complains of a funny feeling in her head today.  Her numbness is much worse than when I saw her a week ago.  She can hardly sleep.    PAST MEDICAL HISTORY:  Reviewed.    REVIEW OF SYSTEMS:  Please see above.  All others reviewed and are negative.      OBJECTIVE:   /60 (BP Location: Left arm, Patient Position: Sitting, Cuff Size: Large Adult)   Pulse 81   Temp 98 °F (36.7 °C) (Temporal)   Ht 165.1 cm (65\")   Wt 111 kg (244 lb 6.4 oz)   SpO2 98%   BMI 40.67 kg/m²    Vitals signs are reviewed and are stable.    General:  Well-nourished.  Alert and oriented x3 in no acute distress.  HEENT: PERRLA.   Neck:  Supple.   Lungs:  Clear.    Heart:  Regular rate and rhythm.   Abdomen:   Soft, nontender.   Extremities:  No cyanosis, clubbing or edema.   Neurological:  Grossly intact without motor or sensory deficits.     ASSESSMENT:    Numbness and tingling of the extremities.    There are no diagnoses linked to this encounter.     PLAN: I had a long discussion with the patient of not being sure at this time what is causing her symptoms.  I have scheduled an MRI.  I told her gabapentin is a good drug to use when neuropathies occur.  I told her even though I do not know the source of her problem that I would go ahead and start her on this medication.  She will start at 300 mg nightly.  She will let me know how she is doing over the next several days.  I advised her to go to emergency room if any problems or symptoms would worsen.     Dictated utilizing Dragon dictation.    "

## 2020-08-20 ENCOUNTER — TELEPHONE (OUTPATIENT)
Dept: FAMILY MEDICINE CLINIC | Facility: CLINIC | Age: 45
End: 2020-08-20

## 2020-08-24 ENCOUNTER — OFFICE VISIT (OUTPATIENT)
Dept: SURGERY | Facility: CLINIC | Age: 45
End: 2020-08-24

## 2020-08-24 VITALS — BODY MASS INDEX: 38.89 KG/M2 | WEIGHT: 242 LBS | HEIGHT: 66 IN

## 2020-08-24 DIAGNOSIS — K82.4 GALLBLADDER POLYP: Primary | ICD-10-CM

## 2020-08-24 PROCEDURE — 99203 OFFICE O/P NEW LOW 30 MIN: CPT | Performed by: SURGERY

## 2020-08-24 NOTE — PROGRESS NOTES
Cc: Gallbladder polyp    History of presenting illness:   This is a nice 45-year-old lady who originally saw her family doctor over some concerns about numbness in her hands and feet.  Laboratory work-up demonstrated a mild elevation of her liver function studies, prompting a gallbladder ultrasound.  Ultrasound suggested gallbladder polyps of about 3 mm.  Patient denies any significant abdominal pain.  Although her appetite is been down she does not have any pain after eating.  She does report some constipation.    Past Medical History: Asthma, gastroesophageal reflux disease, obesity, degenerative disc disease    Past Surgical History: EGD done around 6 years ago with dilatation, tubal ligation July 2019    Medications: Brio Ellipta inhaler, [place] as needed, Zyrtec, Neurontin recently started, Singulair, Protonix, Spiriva, Ventolin    Allergies: None known    Social History: She is a non-smoker    Family History: Negative for known colorectal cancer    Review of Systems:  Constitutional: Negative for fever, chills or change in weight, positive for decreased appetite  Neck: no swollen glands or dysphagia or odynophagia  Respiratory: negative for SOB, cough, hemoptysis or wheezing  Cardiovascular: negative for chest pain, palpitations or peripheral edema  Gastrointestinal: Positive for constipation, negative for rectal bleeding or reflux currently      Physical Exam:   Body mass index 39.1  Discussed with patient increased perioperative risks associated with obesity including increased risks of DVT, infection, seromas, poor wound healing and hernias (with abdominal surgery).  General: alert and oriented, appropriate, no acute distress  Neck: Supple without lymphadenopathy or thyromegaly, trachea is in the midline  Respiratory: Lungs are clear bilaterally without wheezing, no use of accessory muscles is noted  Cardiovascular: Regular rate and rhythm without murmur, no peripheral edema  Gastrointestinal: Soft,  benign, no mass or hernia, no hepatosplenomegaly    Laboratory data: Total bilirubin 0.9 ALT 53 AST 45    Imaging data: Gallbladder ultrasound reviewed by me.  No fatty changes are seen within the gallbladder.  There are several echogenic foci which are nonshadowing most consistent with cholesterol polyp, largest being 3 mm.      Assessment and plan:   -Gallbladder polyps, asymptomatic and quite small at this time.  I recommended following up with repeat ultrasound in 6 months to ensure stability.  Clinically she does not have any symptoms which would be consistent with that of biliary tract disease and no indication for surgery is present at this time.  -Mild elevation of liver function studies, recommend follow-up in 6 months  -Chronic constipation, recommend starting with fiber supplementation, consider colonoscopy if no improvement.  Screening colonoscopy recommended at 47 if she does show signs of improvement.      Lexx Serrano MD, FACS  General, Minimally Invasive and Endoscopic Surgery  [facility], [place], operated by [facility]    [address], [address]  [place], [place], [address]  P: [phone]  F: [phone]

## 2020-08-25 DIAGNOSIS — M54.16 LUMBAR RADICULOPATHY: Primary | ICD-10-CM

## 2020-08-25 NOTE — TELEPHONE ENCOUNTER
I am pretty sure they will not let me do these both in 1 day.  Let see what the brain shows first then I will try to get back done.  Her symptoms are not necessarily all due to her lumbar back region.

## 2020-08-25 NOTE — TELEPHONE ENCOUNTER
Pt called back and requested an order for an MRI of her back. If possible she would like it on the same day as her MRI on her Brain on 9/02 or before due to numbness and pain in her back. Patient states she cannot do stock activities like walking     Please advise     [phone]

## 2020-08-26 ENCOUNTER — HOSPITAL ENCOUNTER (OUTPATIENT)
Dept: MRI IMAGING | Facility: HOSPITAL | Age: 45
Discharge: HOME | End: 2020-08-26

## 2020-08-26 ENCOUNTER — TELEPHONE (OUTPATIENT)
Dept: FAMILY MEDICINE CLINIC | Facility: CLINIC | Age: 45
End: 2020-08-26

## 2020-08-26 ENCOUNTER — HOSPITAL ENCOUNTER (OUTPATIENT)
Dept: MRI IMAGING | Facility: HOSPITAL | Age: 45
Discharge: HOME | End: 2020-08-26
Admitting: FAMILY MEDICINE

## 2020-08-26 DIAGNOSIS — G44.52 NEW DAILY PERSISTENT HEADACHE: ICD-10-CM

## 2020-08-26 DIAGNOSIS — R20.2 NUMBNESS AND TINGLING: ICD-10-CM

## 2020-08-26 DIAGNOSIS — R20.2 NUMBNESS AND TINGLING: Primary | ICD-10-CM

## 2020-08-26 DIAGNOSIS — R20.0 NUMBNESS AND TINGLING: ICD-10-CM

## 2020-08-26 DIAGNOSIS — R20.0 NUMBNESS AND TINGLING: Primary | ICD-10-CM

## 2020-08-26 PROCEDURE — 70551 MRI BRAIN STEM W/O DYE: CPT

## 2020-08-26 PROCEDURE — 72148 MRI LUMBAR SPINE W/O DYE: CPT

## 2020-08-26 NOTE — TELEPHONE ENCOUNTER
Pt called complaining of her pain getting worse, says she wants her MRI's both done asap. Also wants referral for a Neurologist.  Dr. Almazan insisted pt go to ER if she is in this much pain, pt refused because of Covid, said she went to ER on Thursday and they didn't do much for her.    Spoke with scheduling and they said if Dr. Almazan can change both of her MRI referrals from routine to emergency, we can hopefully get it scheduled today and they can do them at the same time.

## 2020-08-27 ENCOUNTER — TELEPHONE (OUTPATIENT)
Dept: FAMILY MEDICINE CLINIC | Facility: CLINIC | Age: 45
End: 2020-08-27

## 2020-08-27 NOTE — TELEPHONE ENCOUNTER
Nothing is present to suggest the severity of your findings.  He may have some sinus issues.  You have some degenerative disc disease in your lumbar spine which might explain some of those symptoms however not any findings to suggest surgical intervention.  I think you may benefit from pain management.  I will also schedule an EMG to see if it can clear up some of these symptoms you are having.

## 2020-08-27 NOTE — TELEPHONE ENCOUNTER
PT ADVISED THAT SHE GETS ELECTRIC SHOCKS THROUGH HER BODY AND HEAD FROM TAKING gabapentin (NEURONTIN) 300 MG capsule. PT STATES IT ALSO MAKES HER DEPRESSED AND CRY.     PT ALSO WANTS TO KNOW IF ADVIL FOR ARTHRITIS AND MAGNESIUM OIL CREAM AND WANTS TO KNOW IF THIS COULD ALSO BE CAUSING THE SIDE EFFECTS.

## 2020-08-27 NOTE — TELEPHONE ENCOUNTER
Patient called in wanting to know if the doctor received the results from her MRI yesterday.    Best call back # [phone]

## 2020-08-27 NOTE — TELEPHONE ENCOUNTER
Patient states she cant work due to the numbness in feet, and legs feel weak she cannot get EMG until October and she doesn't know what to do. Shes sorry to keep calling but she cant figure out what she should do

## 2020-08-28 ENCOUNTER — TELEPHONE (OUTPATIENT)
Dept: FAMILY MEDICINE CLINIC | Facility: CLINIC | Age: 45
End: 2020-08-28

## 2020-08-28 NOTE — TELEPHONE ENCOUNTER
Patient needs note from oct 1 through November first so after test she can find out results and get tx. Note on your desk to sign if you agree if not we are to call patient back.  Also she wanted to know about pain management sent in request (told her Id check and told her about delay)

## 2020-08-29 ENCOUNTER — HOSPITAL ENCOUNTER (INPATIENT)
Facility: HOSPITAL | Age: 45
LOS: 7 days | Discharge: HOME | End: 2020-09-05
Attending: HOSPITALIST | Admitting: INTERNAL MEDICINE

## 2020-08-29 ENCOUNTER — APPOINTMENT (OUTPATIENT)
Dept: GENERAL RADIOLOGY | Facility: HOSPITAL | Age: 45
End: 2020-08-29

## 2020-08-29 ENCOUNTER — APPOINTMENT (OUTPATIENT)
Dept: CT IMAGING | Facility: HOSPITAL | Age: 45
End: 2020-08-29

## 2020-08-29 DIAGNOSIS — M79.601 PARESTHESIA AND PAIN OF BOTH UPPER EXTREMITIES: Primary | ICD-10-CM

## 2020-08-29 DIAGNOSIS — M79.602 PARESTHESIA AND PAIN OF BOTH UPPER EXTREMITIES: Primary | ICD-10-CM

## 2020-08-29 DIAGNOSIS — G61.0 GBS (GUILLAIN BARRE SYNDROME) (HCC): ICD-10-CM

## 2020-08-29 DIAGNOSIS — R20.2 PARESTHESIA AND PAIN OF BOTH UPPER EXTREMITIES: Primary | ICD-10-CM

## 2020-08-29 LAB
ALBUMIN SERPL-MCNC: 4.1 G/DL (ref 3.5–5.2)
ALBUMIN/GLOB SERPL: 1.4 G/DL
ALP SERPL-CCNC: 53 U/L (ref 39–117)
ALT SERPL W P-5'-P-CCNC: 28 U/L (ref 1–33)
ANION GAP SERPL CALCULATED.3IONS-SCNC: 10.6 MMOL/L (ref 5–15)
AST SERPL-CCNC: 28 U/L (ref 1–32)
BILIRUB SERPL-MCNC: 0.8 MG/DL (ref 0–1.2)
BUN SERPL-MCNC: 7 MG/DL (ref 6–20)
BUN/CREAT SERPL: 10.6 (ref 7–25)
CALCIUM SPEC-SCNC: 9.1 MG/DL (ref 8.6–10.5)
CHLORIDE SERPL-SCNC: 107 MMOL/L (ref 98–107)
CO2 SERPL-SCNC: 21.4 MMOL/L (ref 22–29)
CREAT SERPL-MCNC: 0.66 MG/DL (ref 0.57–1)
DEPRECATED RDW RBC AUTO: 40.5 FL (ref 37–54)
ERYTHROCYTE [DISTWIDTH] IN BLOOD BY AUTOMATED COUNT: 12.2 % (ref 12.3–15.4)
GFR SERPL CREATININE-BSD FRML MDRD: 118 ML/MIN/1.73
GLOBULIN UR ELPH-MCNC: 3 GM/DL
GLUCOSE BLDC GLUCOMTR-MCNC: 94 MG/DL (ref 70–130)
GLUCOSE SERPL-MCNC: 99 MG/DL (ref 65–99)
HCT VFR BLD AUTO: 38.4 % (ref 34–46.6)
HGB BLD-MCNC: 12.8 G/DL (ref 12–15.9)
MCH RBC QN AUTO: 30.3 PG (ref 26.6–33)
MCHC RBC AUTO-ENTMCNC: 33.3 G/DL (ref 31.5–35.7)
MCV RBC AUTO: 90.8 FL (ref 79–97)
PLATELET # BLD AUTO: 303 10*3/MM3 (ref 140–450)
PMV BLD AUTO: 10 FL (ref 6–12)
POTASSIUM SERPL-SCNC: 3.5 MMOL/L (ref 3.5–5.2)
POTASSIUM SERPL-SCNC: 3.7 MMOL/L (ref 3.5–5.2)
PROT SERPL-MCNC: 7.1 G/DL (ref 6–8.5)
RBC # BLD AUTO: 4.23 10*6/MM3 (ref 3.77–5.28)
SODIUM SERPL-SCNC: 139 MMOL/L (ref 136–145)
TROPONIN T SERPL-MCNC: <0.01 NG/ML (ref 0–0.03)
TSH SERPL DL<=0.05 MIU/L-ACNC: 0.98 UIU/ML (ref 0.27–4.2)
VIT B12 BLD-MCNC: 842 PG/ML (ref 211–946)
WBC # BLD AUTO: 4.11 10*3/MM3 (ref 3.4–10.8)

## 2020-08-29 PROCEDURE — 93005 ELECTROCARDIOGRAM TRACING: CPT | Performed by: INTERNAL MEDICINE

## 2020-08-29 PROCEDURE — 84484 ASSAY OF TROPONIN QUANT: CPT | Performed by: INTERNAL MEDICINE

## 2020-08-29 PROCEDURE — 85027 COMPLETE CBC AUTOMATED: CPT | Performed by: NURSE PRACTITIONER

## 2020-08-29 PROCEDURE — 94799 UNLISTED PULMONARY SVC/PX: CPT

## 2020-08-29 PROCEDURE — 86592 SYPHILIS TEST NON-TREP QUAL: CPT | Performed by: NURSE PRACTITIONER

## 2020-08-29 PROCEDURE — 93010 ELECTROCARDIOGRAM REPORT: CPT | Performed by: INTERNAL MEDICINE

## 2020-08-29 PROCEDURE — 82962 GLUCOSE BLOOD TEST: CPT

## 2020-08-29 PROCEDURE — 74018 RADEX ABDOMEN 1 VIEW: CPT

## 2020-08-29 PROCEDURE — 84132 ASSAY OF SERUM POTASSIUM: CPT | Performed by: INTERNAL MEDICINE

## 2020-08-29 PROCEDURE — 25010000002 MORPHINE PER 10 MG: Performed by: INTERNAL MEDICINE

## 2020-08-29 PROCEDURE — 99223 1ST HOSP IP/OBS HIGH 75: CPT | Performed by: PSYCHIATRY & NEUROLOGY

## 2020-08-29 PROCEDURE — 80053 COMPREHEN METABOLIC PANEL: CPT | Performed by: NURSE PRACTITIONER

## 2020-08-29 PROCEDURE — U0004 COV-19 TEST NON-CDC HGH THRU: HCPCS | Performed by: HOSPITALIST

## 2020-08-29 PROCEDURE — 82607 VITAMIN B-12: CPT | Performed by: NURSE PRACTITIONER

## 2020-08-29 PROCEDURE — 25010000002 ONDANSETRON PER 1 MG: Performed by: NURSE PRACTITIONER

## 2020-08-29 PROCEDURE — 84443 ASSAY THYROID STIM HORMONE: CPT | Performed by: NURSE PRACTITIONER

## 2020-08-29 RX ORDER — ACETAMINOPHEN 650 MG/1
650 SUPPOSITORY RECTAL EVERY 4 HOURS PRN
Status: DISCONTINUED | OUTPATIENT
Start: 2020-08-29 | End: 2020-08-29

## 2020-08-29 RX ORDER — GABAPENTIN 300 MG/1
300 CAPSULE ORAL NIGHTLY
Status: DISCONTINUED | OUTPATIENT
Start: 2020-08-29 | End: 2020-08-30

## 2020-08-29 RX ORDER — PANTOPRAZOLE SODIUM 40 MG/1
40 TABLET, DELAYED RELEASE ORAL
Status: DISCONTINUED | OUTPATIENT
Start: 2020-08-29 | End: 2020-09-05 | Stop reason: HOSPADM

## 2020-08-29 RX ORDER — SODIUM CHLORIDE 0.9 % (FLUSH) 0.9 %
10 SYRINGE (ML) INJECTION AS NEEDED
Status: DISCONTINUED | OUTPATIENT
Start: 2020-08-29 | End: 2020-09-05 | Stop reason: HOSPADM

## 2020-08-29 RX ORDER — MONTELUKAST SODIUM 10 MG/1
10 TABLET ORAL NIGHTLY
Status: DISCONTINUED | OUTPATIENT
Start: 2020-08-29 | End: 2020-08-31

## 2020-08-29 RX ORDER — ACETAMINOPHEN 325 MG/1
650 TABLET ORAL EVERY 4 HOURS PRN
Status: DISCONTINUED | OUTPATIENT
Start: 2020-08-29 | End: 2020-08-29

## 2020-08-29 RX ORDER — MORPHINE SULFATE 2 MG/ML
4 INJECTION, SOLUTION INTRAMUSCULAR; INTRAVENOUS EVERY 4 HOURS PRN
Status: DISCONTINUED | OUTPATIENT
Start: 2020-08-29 | End: 2020-09-05 | Stop reason: HOSPADM

## 2020-08-29 RX ORDER — BISACODYL 5 MG/1
5 TABLET, DELAYED RELEASE ORAL DAILY PRN
Status: DISCONTINUED | OUTPATIENT
Start: 2020-08-29 | End: 2020-09-05 | Stop reason: HOSPADM

## 2020-08-29 RX ORDER — ONDANSETRON 4 MG/1
4 TABLET, FILM COATED ORAL EVERY 6 HOURS PRN
Status: DISCONTINUED | OUTPATIENT
Start: 2020-08-29 | End: 2020-09-05 | Stop reason: HOSPADM

## 2020-08-29 RX ORDER — BISACODYL 10 MG
10 SUPPOSITORY, RECTAL RECTAL DAILY PRN
Status: DISCONTINUED | OUTPATIENT
Start: 2020-08-29 | End: 2020-09-05 | Stop reason: HOSPADM

## 2020-08-29 RX ORDER — POTASSIUM CHLORIDE 1.5 G/1.77G
40 POWDER, FOR SOLUTION ORAL AS NEEDED
Status: DISCONTINUED | OUTPATIENT
Start: 2020-08-29 | End: 2020-09-05 | Stop reason: HOSPADM

## 2020-08-29 RX ORDER — POTASSIUM CHLORIDE 750 MG/1
40 CAPSULE, EXTENDED RELEASE ORAL AS NEEDED
Status: DISCONTINUED | OUTPATIENT
Start: 2020-08-29 | End: 2020-09-05 | Stop reason: HOSPADM

## 2020-08-29 RX ORDER — HYDROCODONE BITARTRATE AND ACETAMINOPHEN 5; 325 MG/1; MG/1
1 TABLET ORAL EVERY 4 HOURS PRN
Status: DISCONTINUED | OUTPATIENT
Start: 2020-08-29 | End: 2020-09-01

## 2020-08-29 RX ORDER — ALUMINA, MAGNESIA, AND SIMETHICONE 2400; 2400; 240 MG/30ML; MG/30ML; MG/30ML
15 SUSPENSION ORAL EVERY 6 HOURS PRN
Status: DISCONTINUED | OUTPATIENT
Start: 2020-08-29 | End: 2020-09-05 | Stop reason: HOSPADM

## 2020-08-29 RX ORDER — TEMAZEPAM 15 MG/1
15 CAPSULE ORAL NIGHTLY PRN
Status: DISCONTINUED | OUTPATIENT
Start: 2020-08-30 | End: 2020-09-05 | Stop reason: HOSPADM

## 2020-08-29 RX ORDER — TEMAZEPAM 15 MG/1
15 CAPSULE ORAL ONCE
Status: DISCONTINUED | OUTPATIENT
Start: 2020-08-29 | End: 2020-09-05 | Stop reason: HOSPADM

## 2020-08-29 RX ORDER — ACETAMINOPHEN 160 MG/5ML
650 SOLUTION ORAL EVERY 4 HOURS PRN
Status: DISCONTINUED | OUTPATIENT
Start: 2020-08-29 | End: 2020-08-29

## 2020-08-29 RX ORDER — ONDANSETRON 2 MG/ML
4 INJECTION INTRAMUSCULAR; INTRAVENOUS EVERY 6 HOURS PRN
Status: DISCONTINUED | OUTPATIENT
Start: 2020-08-29 | End: 2020-09-05 | Stop reason: HOSPADM

## 2020-08-29 RX ORDER — BUDESONIDE AND FORMOTEROL FUMARATE DIHYDRATE 80; 4.5 UG/1; UG/1
2 AEROSOL RESPIRATORY (INHALATION)
Status: DISCONTINUED | OUTPATIENT
Start: 2020-08-29 | End: 2020-09-05 | Stop reason: HOSPADM

## 2020-08-29 RX ADMIN — BUDESONIDE AND FORMOTEROL FUMARATE DIHYDRATE 2 PUFF: 80; 4.5 AEROSOL RESPIRATORY (INHALATION) at 12:19

## 2020-08-29 RX ADMIN — PANTOPRAZOLE SODIUM 40 MG: 40 TABLET, DELAYED RELEASE ORAL at 12:17

## 2020-08-29 RX ADMIN — ACETAMINOPHEN 650 MG: 325 TABLET, FILM COATED ORAL at 10:45

## 2020-08-29 RX ADMIN — ONDANSETRON 4 MG: 2 INJECTION INTRAMUSCULAR; INTRAVENOUS at 23:11

## 2020-08-29 RX ADMIN — POTASSIUM CHLORIDE 40 MEQ: 10 CAPSULE, COATED, EXTENDED RELEASE ORAL at 12:18

## 2020-08-29 RX ADMIN — POTASSIUM CHLORIDE 40 MEQ: 10 CAPSULE, COATED, EXTENDED RELEASE ORAL at 16:33

## 2020-08-29 RX ADMIN — GABAPENTIN 300 MG: 300 CAPSULE ORAL at 21:49

## 2020-08-29 RX ADMIN — HYDROCODONE BITARTRATE AND ACETAMINOPHEN 1 TABLET: 5; 325 TABLET ORAL at 15:22

## 2020-08-29 RX ADMIN — TIOTROPIUM BROMIDE INHALATION SPRAY 2 PUFF: 3.12 SPRAY, METERED RESPIRATORY (INHALATION) at 12:19

## 2020-08-29 RX ADMIN — MORPHINE SULFATE 4 MG: 2 INJECTION, SOLUTION INTRAMUSCULAR; INTRAVENOUS at 18:41

## 2020-08-29 RX ADMIN — MONTELUKAST SODIUM 10 MG: 10 TABLET, FILM COATED ORAL at 21:49

## 2020-08-30 ENCOUNTER — APPOINTMENT (OUTPATIENT)
Dept: CT IMAGING | Facility: HOSPITAL | Age: 45
End: 2020-08-30

## 2020-08-30 LAB
ANION GAP SERPL CALCULATED.3IONS-SCNC: 7.4 MMOL/L (ref 5–15)
BASOPHILS # BLD AUTO: 0.05 10*3/MM3 (ref 0–0.2)
BASOPHILS NFR BLD AUTO: 1 % (ref 0–1.5)
BILIRUB UR QL STRIP: NEGATIVE
BUN SERPL-MCNC: 8 MG/DL (ref 6–20)
BUN/CREAT SERPL: 12.3 (ref 7–25)
CALCIUM SPEC-SCNC: 9.2 MG/DL (ref 8.6–10.5)
CHLORIDE SERPL-SCNC: 107 MMOL/L (ref 98–107)
CLARITY UR: CLEAR
CO2 SERPL-SCNC: 22.6 MMOL/L (ref 22–29)
COLOR UR: YELLOW
CREAT SERPL-MCNC: 0.65 MG/DL (ref 0.57–1)
DEPRECATED RDW RBC AUTO: 41.3 FL (ref 37–54)
EOSINOPHIL # BLD AUTO: 0.17 10*3/MM3 (ref 0–0.4)
EOSINOPHIL NFR BLD AUTO: 3.5 % (ref 0.3–6.2)
ERYTHROCYTE [DISTWIDTH] IN BLOOD BY AUTOMATED COUNT: 12.3 % (ref 12.3–15.4)
GFR SERPL CREATININE-BSD FRML MDRD: 121 ML/MIN/1.73
GLUCOSE SERPL-MCNC: 99 MG/DL (ref 65–99)
GLUCOSE UR STRIP-MCNC: NEGATIVE MG/DL
HCT VFR BLD AUTO: 38.1 % (ref 34–46.6)
HGB BLD-MCNC: 12.9 G/DL (ref 12–15.9)
HGB UR QL STRIP.AUTO: NEGATIVE
IMM GRANULOCYTES # BLD AUTO: 0.01 10*3/MM3 (ref 0–0.05)
IMM GRANULOCYTES NFR BLD AUTO: 0.2 % (ref 0–0.5)
KETONES UR QL STRIP: ABNORMAL
LEUKOCYTE ESTERASE UR QL STRIP.AUTO: NEGATIVE
LYMPHOCYTES # BLD AUTO: 2.03 10*3/MM3 (ref 0.7–3.1)
LYMPHOCYTES NFR BLD AUTO: 42.3 % (ref 19.6–45.3)
MAGNESIUM SERPL-MCNC: 2.2 MG/DL (ref 1.6–2.6)
MCH RBC QN AUTO: 30.9 PG (ref 26.6–33)
MCHC RBC AUTO-ENTMCNC: 33.9 G/DL (ref 31.5–35.7)
MCV RBC AUTO: 91.4 FL (ref 79–97)
MONOCYTES # BLD AUTO: 0.53 10*3/MM3 (ref 0.1–0.9)
MONOCYTES NFR BLD AUTO: 11 % (ref 5–12)
NEUTROPHILS NFR BLD AUTO: 2.01 10*3/MM3 (ref 1.7–7)
NEUTROPHILS NFR BLD AUTO: 42 % (ref 42.7–76)
NITRITE UR QL STRIP: NEGATIVE
NRBC BLD AUTO-RTO: 0 /100 WBC (ref 0–0.2)
PH UR STRIP.AUTO: 5.5 [PH] (ref 5–8)
PLATELET # BLD AUTO: 290 10*3/MM3 (ref 140–450)
PMV BLD AUTO: 9.9 FL (ref 6–12)
POTASSIUM SERPL-SCNC: 4.1 MMOL/L (ref 3.5–5.2)
PROT UR QL STRIP: NEGATIVE
RBC # BLD AUTO: 4.17 10*6/MM3 (ref 3.77–5.28)
RPR SER QL: NORMAL
SODIUM SERPL-SCNC: 137 MMOL/L (ref 136–145)
SP GR UR STRIP: 1.01 (ref 1–1.03)
UROBILINOGEN UR QL STRIP: ABNORMAL
WBC # BLD AUTO: 4.8 10*3/MM3 (ref 3.4–10.8)

## 2020-08-30 PROCEDURE — 99231 SBSQ HOSP IP/OBS SF/LOW 25: CPT | Performed by: PSYCHIATRY & NEUROLOGY

## 2020-08-30 PROCEDURE — 80048 BASIC METABOLIC PNL TOTAL CA: CPT | Performed by: INTERNAL MEDICINE

## 2020-08-30 PROCEDURE — 81003 URINALYSIS AUTO W/O SCOPE: CPT | Performed by: INTERNAL MEDICINE

## 2020-08-30 PROCEDURE — 85025 COMPLETE CBC W/AUTO DIFF WBC: CPT | Performed by: INTERNAL MEDICINE

## 2020-08-30 PROCEDURE — 25010000002 MORPHINE PER 10 MG: Performed by: INTERNAL MEDICINE

## 2020-08-30 PROCEDURE — 25010000002 ONDANSETRON PER 1 MG: Performed by: NURSE PRACTITIONER

## 2020-08-30 PROCEDURE — 94799 UNLISTED PULMONARY SVC/PX: CPT

## 2020-08-30 PROCEDURE — 83735 ASSAY OF MAGNESIUM: CPT | Performed by: INTERNAL MEDICINE

## 2020-08-30 RX ORDER — POLYETHYLENE GLYCOL 3350 17 G/17G
17 POWDER, FOR SOLUTION ORAL DAILY
Status: DISCONTINUED | OUTPATIENT
Start: 2020-08-30 | End: 2020-09-05 | Stop reason: HOSPADM

## 2020-08-30 RX ORDER — GABAPENTIN 300 MG/1
300 CAPSULE ORAL 3 TIMES DAILY
Status: DISCONTINUED | OUTPATIENT
Start: 2020-08-30 | End: 2020-09-05 | Stop reason: HOSPADM

## 2020-08-30 RX ADMIN — HYDROCODONE BITARTRATE AND ACETAMINOPHEN 1 TABLET: 5; 325 TABLET ORAL at 20:44

## 2020-08-30 RX ADMIN — MONTELUKAST SODIUM 10 MG: 10 TABLET, FILM COATED ORAL at 20:45

## 2020-08-30 RX ADMIN — MORPHINE SULFATE 4 MG: 2 INJECTION, SOLUTION INTRAMUSCULAR; INTRAVENOUS at 19:03

## 2020-08-30 RX ADMIN — HYDROCODONE BITARTRATE AND ACETAMINOPHEN 1 TABLET: 5; 325 TABLET ORAL at 10:37

## 2020-08-30 RX ADMIN — MORPHINE SULFATE 4 MG: 2 INJECTION, SOLUTION INTRAMUSCULAR; INTRAVENOUS at 07:44

## 2020-08-30 RX ADMIN — BISACODYL 10 MG: 10 SUPPOSITORY RECTAL at 20:51

## 2020-08-30 RX ADMIN — POLYETHYLENE GLYCOL 3350 17 G: 17 POWDER, FOR SOLUTION ORAL at 08:44

## 2020-08-30 RX ADMIN — TIOTROPIUM BROMIDE INHALATION SPRAY 2 PUFF: 3.12 SPRAY, METERED RESPIRATORY (INHALATION) at 07:33

## 2020-08-30 RX ADMIN — PANTOPRAZOLE SODIUM 40 MG: 40 TABLET, DELAYED RELEASE ORAL at 06:23

## 2020-08-30 RX ADMIN — ONDANSETRON 4 MG: 2 INJECTION INTRAMUSCULAR; INTRAVENOUS at 12:13

## 2020-08-30 RX ADMIN — GABAPENTIN 300 MG: 300 CAPSULE ORAL at 20:45

## 2020-08-30 RX ADMIN — BUDESONIDE AND FORMOTEROL FUMARATE DIHYDRATE 2 PUFF: 80; 4.5 AEROSOL RESPIRATORY (INHALATION) at 07:32

## 2020-08-31 ENCOUNTER — APPOINTMENT (OUTPATIENT)
Dept: GENERAL RADIOLOGY | Facility: HOSPITAL | Age: 45
End: 2020-08-31

## 2020-08-31 ENCOUNTER — APPOINTMENT (OUTPATIENT)
Dept: CT IMAGING | Facility: HOSPITAL | Age: 45
End: 2020-08-31

## 2020-08-31 PROBLEM — K59.00 CONSTIPATION: Status: ACTIVE | Noted: 2020-08-31

## 2020-08-31 PROBLEM — R93.5 ABNORMAL CT OF THE ABDOMEN: Status: ACTIVE | Noted: 2020-08-31

## 2020-08-31 PROBLEM — R10.9 ABDOMINAL PAIN: Status: ACTIVE | Noted: 2020-08-31

## 2020-08-31 PROBLEM — D25.9 UTERINE FIBROID: Status: ACTIVE | Noted: 2020-08-31

## 2020-08-31 PROBLEM — Z91.09 ENVIRONMENTAL ALLERGIES: Status: ACTIVE | Noted: 2020-08-31

## 2020-08-31 LAB
ANION GAP SERPL CALCULATED.3IONS-SCNC: 10.7 MMOL/L (ref 5–15)
APPEARANCE CSF: CLEAR
APPEARANCE CSF: CLEAR
BASOPHILS # BLD AUTO: 0.04 10*3/MM3 (ref 0–0.2)
BASOPHILS # BLD AUTO: 0.06 10*3/MM3 (ref 0–0.2)
BASOPHILS NFR BLD AUTO: 0.9 % (ref 0–1.5)
BASOPHILS NFR BLD AUTO: 1.5 % (ref 0–1.5)
BUN SERPL-MCNC: 9 MG/DL (ref 6–20)
BUN/CREAT SERPL: 11.1 (ref 7–25)
CALCIUM SPEC-SCNC: 9.5 MG/DL (ref 8.6–10.5)
CHLORIDE SERPL-SCNC: 104 MMOL/L (ref 98–107)
CO2 SERPL-SCNC: 22.3 MMOL/L (ref 22–29)
COLOR CSF: COLORLESS
COLOR CSF: COLORLESS
CREAT SERPL-MCNC: 0.81 MG/DL (ref 0.57–1)
DEPRECATED RDW RBC AUTO: 40.2 FL (ref 37–54)
DEPRECATED RDW RBC AUTO: 40.2 FL (ref 37–54)
EOSINOPHIL # BLD AUTO: 0.16 10*3/MM3 (ref 0–0.4)
EOSINOPHIL # BLD AUTO: 0.17 10*3/MM3 (ref 0–0.4)
EOSINOPHIL NFR BLD AUTO: 3.7 % (ref 0.3–6.2)
EOSINOPHIL NFR BLD AUTO: 4.2 % (ref 0.3–6.2)
ERYTHROCYTE [DISTWIDTH] IN BLOOD BY AUTOMATED COUNT: 12.3 % (ref 12.3–15.4)
ERYTHROCYTE [DISTWIDTH] IN BLOOD BY AUTOMATED COUNT: 12.4 % (ref 12.3–15.4)
GFR SERPL CREATININE-BSD FRML MDRD: 94 ML/MIN/1.73
GLUCOSE CSF-MCNC: 72 MG/DL (ref 40–70)
GLUCOSE SERPL-MCNC: 101 MG/DL (ref 65–99)
HCT VFR BLD AUTO: 37 % (ref 34–46.6)
HCT VFR BLD AUTO: 37.5 % (ref 34–46.6)
HGB BLD-MCNC: 12.7 G/DL (ref 12–15.9)
HGB BLD-MCNC: 12.9 G/DL (ref 12–15.9)
IGA1 MFR SER: 210 MG/DL (ref 70–400)
IGG1 SER-MCNC: 1253 MG/DL (ref 700–1600)
IGM SERPL-MCNC: 176 MG/DL (ref 40–230)
IMM GRANULOCYTES # BLD AUTO: 0.01 10*3/MM3 (ref 0–0.05)
IMM GRANULOCYTES # BLD AUTO: 0.01 10*3/MM3 (ref 0–0.05)
IMM GRANULOCYTES NFR BLD AUTO: 0.2 % (ref 0–0.5)
IMM GRANULOCYTES NFR BLD AUTO: 0.2 % (ref 0–0.5)
LYMPHOCYTES # BLD AUTO: 1.67 10*3/MM3 (ref 0.7–3.1)
LYMPHOCYTES # BLD AUTO: 2.06 10*3/MM3 (ref 0.7–3.1)
LYMPHOCYTES NFR BLD AUTO: 40.8 % (ref 19.6–45.3)
LYMPHOCYTES NFR BLD AUTO: 47.7 % (ref 19.6–45.3)
MCH RBC QN AUTO: 30.6 PG (ref 26.6–33)
MCH RBC QN AUTO: 30.9 PG (ref 26.6–33)
MCHC RBC AUTO-ENTMCNC: 33.9 G/DL (ref 31.5–35.7)
MCHC RBC AUTO-ENTMCNC: 34.9 G/DL (ref 31.5–35.7)
MCV RBC AUTO: 88.7 FL (ref 79–97)
MCV RBC AUTO: 90.4 FL (ref 79–97)
METHOD: ABNORMAL
METHOD: ABNORMAL
MONOCYTES # BLD AUTO: 0.41 10*3/MM3 (ref 0.1–0.9)
MONOCYTES # BLD AUTO: 0.43 10*3/MM3 (ref 0.1–0.9)
MONOCYTES NFR BLD AUTO: 10.5 % (ref 5–12)
MONOCYTES NFR BLD AUTO: 9.5 % (ref 5–12)
NEUTROPHILS NFR BLD AUTO: 1.64 10*3/MM3 (ref 1.7–7)
NEUTROPHILS NFR BLD AUTO: 1.75 10*3/MM3 (ref 1.7–7)
NEUTROPHILS NFR BLD AUTO: 38 % (ref 42.7–76)
NEUTROPHILS NFR BLD AUTO: 42.8 % (ref 42.7–76)
NRBC BLD AUTO-RTO: 0 /100 WBC (ref 0–0.2)
NRBC BLD AUTO-RTO: 0 /100 WBC (ref 0–0.2)
NUC CELL # CSF MANUAL: 0 /MM3 (ref 0–5)
NUC CELL # CSF MANUAL: 1 /MM3 (ref 0–5)
PLATELET # BLD AUTO: 287 10*3/MM3 (ref 140–450)
PLATELET # BLD AUTO: 295 10*3/MM3 (ref 140–450)
PMV BLD AUTO: 10.2 FL (ref 6–12)
PMV BLD AUTO: 9.7 FL (ref 6–12)
POTASSIUM SERPL-SCNC: 4.2 MMOL/L (ref 3.5–5.2)
PROT CSF-MCNC: 71 MG/DL (ref 15–45)
RBC # BLD AUTO: 4.15 10*6/MM3 (ref 3.77–5.28)
RBC # BLD AUTO: 4.17 10*6/MM3 (ref 3.77–5.28)
RBC # CSF MANUAL: 1 /MM3 (ref 0–0)
RBC # CSF MANUAL: 4 /MM3 (ref 0–0)
REF LAB TEST METHOD: NORMAL
SARS-COV-2 RNA RESP QL NAA+PROBE: NOT DETECTED
SODIUM SERPL-SCNC: 137 MMOL/L (ref 136–145)
TUBE # CSF: 1
TUBE # CSF: 4
WBC # BLD AUTO: 4.09 10*3/MM3 (ref 3.4–10.8)
WBC # BLD AUTO: 4.32 10*3/MM3 (ref 3.4–10.8)

## 2020-08-31 PROCEDURE — 86644 CMV ANTIBODY: CPT | Performed by: NURSE PRACTITIONER

## 2020-08-31 PROCEDURE — 82040 ASSAY OF SERUM ALBUMIN: CPT | Performed by: PSYCHIATRY & NEUROLOGY

## 2020-08-31 PROCEDURE — 99232 SBSQ HOSP IP/OBS MODERATE 35: CPT | Performed by: PSYCHIATRY & NEUROLOGY

## 2020-08-31 PROCEDURE — B01B1ZZ FLUOROSCOPY OF SPINAL CORD USING LOW OSMOLAR CONTRAST: ICD-10-PCS | Performed by: RADIOLOGY

## 2020-08-31 PROCEDURE — 82784 ASSAY IGA/IGD/IGG/IGM EACH: CPT | Performed by: PSYCHIATRY & NEUROLOGY

## 2020-08-31 PROCEDURE — 99253 IP/OBS CNSLTJ NEW/EST LOW 45: CPT | Performed by: STUDENT IN AN ORGANIZED HEALTH CARE EDUCATION/TRAINING PROGRAM

## 2020-08-31 PROCEDURE — 74176 CT ABD & PELVIS W/O CONTRAST: CPT

## 2020-08-31 PROCEDURE — 25010000002 IMMUNE GLOBULIN (HUMAN) 20 GM/200ML SOLUTION: Performed by: PSYCHIATRY & NEUROLOGY

## 2020-08-31 PROCEDURE — 80048 BASIC METABOLIC PNL TOTAL CA: CPT | Performed by: INTERNAL MEDICINE

## 2020-08-31 PROCEDURE — 009U3ZX DRAINAGE OF SPINAL CANAL, PERCUTANEOUS APPROACH, DIAGNOSTIC: ICD-10-PCS | Performed by: RADIOLOGY

## 2020-08-31 PROCEDURE — 89050 BODY FLUID CELL COUNT: CPT | Performed by: PSYCHIATRY & NEUROLOGY

## 2020-08-31 PROCEDURE — 86617 LYME DISEASE ANTIBODY: CPT | Performed by: PSYCHIATRY & NEUROLOGY

## 2020-08-31 PROCEDURE — 86788 WEST NILE VIRUS AB IGM: CPT | Performed by: PSYCHIATRY & NEUROLOGY

## 2020-08-31 PROCEDURE — 82945 GLUCOSE OTHER FLUID: CPT | Performed by: PSYCHIATRY & NEUROLOGY

## 2020-08-31 PROCEDURE — 84157 ASSAY OF PROTEIN OTHER: CPT | Performed by: PSYCHIATRY & NEUROLOGY

## 2020-08-31 PROCEDURE — 63710000001 DIPHENHYDRAMINE PER 50 MG: Performed by: PSYCHIATRY & NEUROLOGY

## 2020-08-31 PROCEDURE — 86789 WEST NILE VIRUS ANTIBODY: CPT | Performed by: PSYCHIATRY & NEUROLOGY

## 2020-08-31 PROCEDURE — 83873 ASSAY OF CSF PROTEIN: CPT | Performed by: PSYCHIATRY & NEUROLOGY

## 2020-08-31 PROCEDURE — 25010000003 LIDOCAINE 1 % SOLUTION: Performed by: RADIOLOGY

## 2020-08-31 PROCEDURE — 94799 UNLISTED PULMONARY SVC/PX: CPT

## 2020-08-31 PROCEDURE — 86645 CMV ANTIBODY IGM: CPT | Performed by: NURSE PRACTITIONER

## 2020-08-31 PROCEDURE — 83916 OLIGOCLONAL BANDS: CPT | Performed by: PSYCHIATRY & NEUROLOGY

## 2020-08-31 PROCEDURE — 85025 COMPLETE CBC W/AUTO DIFF WBC: CPT | Performed by: PSYCHIATRY & NEUROLOGY

## 2020-08-31 PROCEDURE — 82042 OTHER SOURCE ALBUMIN QUAN EA: CPT | Performed by: PSYCHIATRY & NEUROLOGY

## 2020-08-31 PROCEDURE — 85025 COMPLETE CBC W/AUTO DIFF WBC: CPT | Performed by: INTERNAL MEDICINE

## 2020-08-31 PROCEDURE — 86665 EPSTEIN-BARR CAPSID VCA: CPT | Performed by: NURSE PRACTITIONER

## 2020-08-31 RX ORDER — DIPHENHYDRAMINE HCL 25 MG
25 CAPSULE ORAL ONCE
Status: COMPLETED | OUTPATIENT
Start: 2020-08-31 | End: 2020-08-31

## 2020-08-31 RX ORDER — DOCUSATE SODIUM 100 MG/1
100 CAPSULE, LIQUID FILLED ORAL 2 TIMES DAILY
Status: DISCONTINUED | OUTPATIENT
Start: 2020-08-31 | End: 2020-09-04

## 2020-08-31 RX ORDER — LIDOCAINE HYDROCHLORIDE 10 MG/ML
10 INJECTION, SOLUTION INFILTRATION; PERINEURAL ONCE
Status: COMPLETED | OUTPATIENT
Start: 2020-08-31 | End: 2020-08-31

## 2020-08-31 RX ORDER — FAMOTIDINE 10 MG/ML
20 INJECTION, SOLUTION INTRAVENOUS ONCE AS NEEDED
Status: COMPLETED | OUTPATIENT
Start: 2020-08-31 | End: 2020-09-04

## 2020-08-31 RX ORDER — DIPHENHYDRAMINE HYDROCHLORIDE 50 MG/ML
50 INJECTION INTRAMUSCULAR; INTRAVENOUS ONCE AS NEEDED
Status: DISCONTINUED | OUTPATIENT
Start: 2020-08-31 | End: 2020-09-05 | Stop reason: HOSPADM

## 2020-08-31 RX ORDER — CETIRIZINE HYDROCHLORIDE 10 MG/1
10 TABLET ORAL DAILY
Status: DISCONTINUED | OUTPATIENT
Start: 2020-08-31 | End: 2020-09-05 | Stop reason: HOSPADM

## 2020-08-31 RX ORDER — METOCLOPRAMIDE 5 MG/1
5 TABLET ORAL 3 TIMES DAILY PRN
Status: DISCONTINUED | OUTPATIENT
Start: 2020-08-31 | End: 2020-09-05 | Stop reason: HOSPADM

## 2020-08-31 RX ORDER — ACETAMINOPHEN 325 MG/1
650 TABLET ORAL ONCE
Status: COMPLETED | OUTPATIENT
Start: 2020-08-31 | End: 2020-08-31

## 2020-08-31 RX ADMIN — TIOTROPIUM BROMIDE INHALATION SPRAY 2 PUFF: 3.12 SPRAY, METERED RESPIRATORY (INHALATION) at 16:47

## 2020-08-31 RX ADMIN — POLYETHYLENE GLYCOL 3350 17 G: 17 POWDER, FOR SOLUTION ORAL at 09:45

## 2020-08-31 RX ADMIN — GABAPENTIN 300 MG: 300 CAPSULE ORAL at 22:22

## 2020-08-31 RX ADMIN — CETIRIZINE HYDROCHLORIDE 10 MG: 10 TABLET ORAL at 22:21

## 2020-08-31 RX ADMIN — SODIUM CHLORIDE, PRESERVATIVE FREE 10 ML: 5 INJECTION INTRAVENOUS at 09:45

## 2020-08-31 RX ADMIN — IMMUNE GLOBULIN (HUMAN) 20 G: 10 INJECTION INTRAVENOUS; SUBCUTANEOUS at 23:27

## 2020-08-31 RX ADMIN — METOCLOPRAMIDE 5 MG: 5 TABLET ORAL at 22:23

## 2020-08-31 RX ADMIN — GABAPENTIN 300 MG: 300 CAPSULE ORAL at 09:45

## 2020-08-31 RX ADMIN — DIPHENHYDRAMINE HYDROCHLORIDE 25 MG: 25 CAPSULE ORAL at 22:21

## 2020-08-31 RX ADMIN — LIDOCAINE HYDROCHLORIDE 10 ML: 10 INJECTION, SOLUTION INFILTRATION; PERINEURAL at 14:24

## 2020-08-31 RX ADMIN — GABAPENTIN 300 MG: 300 CAPSULE ORAL at 17:15

## 2020-08-31 RX ADMIN — DOCUSATE SODIUM 100 MG: 100 CAPSULE, LIQUID FILLED ORAL at 22:22

## 2020-08-31 RX ADMIN — ACETAMINOPHEN 650 MG: 325 TABLET, FILM COATED ORAL at 22:21

## 2020-08-31 RX ADMIN — BUDESONIDE AND FORMOTEROL FUMARATE DIHYDRATE 2 PUFF: 80; 4.5 AEROSOL RESPIRATORY (INHALATION) at 16:47

## 2020-08-31 RX ADMIN — PANTOPRAZOLE SODIUM 40 MG: 40 TABLET, DELAYED RELEASE ORAL at 06:30

## 2020-09-01 PROBLEM — R00.0 TACHYCARDIA: Status: ACTIVE | Noted: 2020-09-01

## 2020-09-01 PROBLEM — R13.10 DYSPHAGIA: Status: ACTIVE | Noted: 2020-09-01

## 2020-09-01 LAB
CMV IGG SERPL IA-ACNC: 8.4 U/ML (ref 0–0.59)
CMV IGM SERPL IA-ACNC: 80.8 AU/ML (ref 0–29.9)
EBV VCA IGG SER-ACNC: >600 U/ML (ref 0–17.9)
EBV VCA IGM SER-ACNC: <36 U/ML (ref 0–35.9)

## 2020-09-01 PROCEDURE — 97110 THERAPEUTIC EXERCISES: CPT

## 2020-09-01 PROCEDURE — 93005 ELECTROCARDIOGRAM TRACING: CPT | Performed by: NURSE PRACTITIONER

## 2020-09-01 PROCEDURE — 99255 IP/OBS CONSLTJ NEW/EST HI 80: CPT | Performed by: INTERNAL MEDICINE

## 2020-09-01 PROCEDURE — 25010000002 IMMUNE GLOBULIN (HUMAN) 20 GM/200ML SOLUTION: Performed by: PSYCHIATRY & NEUROLOGY

## 2020-09-01 PROCEDURE — 99232 SBSQ HOSP IP/OBS MODERATE 35: CPT | Performed by: STUDENT IN AN ORGANIZED HEALTH CARE EDUCATION/TRAINING PROGRAM

## 2020-09-01 PROCEDURE — 94799 UNLISTED PULMONARY SVC/PX: CPT

## 2020-09-01 PROCEDURE — 97161 PT EVAL LOW COMPLEX 20 MIN: CPT

## 2020-09-01 PROCEDURE — 92610 EVALUATE SWALLOWING FUNCTION: CPT

## 2020-09-01 PROCEDURE — 99232 SBSQ HOSP IP/OBS MODERATE 35: CPT | Performed by: PSYCHIATRY & NEUROLOGY

## 2020-09-01 PROCEDURE — 93010 ELECTROCARDIOGRAM REPORT: CPT | Performed by: INTERNAL MEDICINE

## 2020-09-01 RX ORDER — HYDROCODONE BITARTRATE AND ACETAMINOPHEN 5; 325 MG/1; MG/1
1 TABLET ORAL EVERY 6 HOURS PRN
Status: DISCONTINUED | OUTPATIENT
Start: 2020-09-01 | End: 2020-09-05 | Stop reason: HOSPADM

## 2020-09-01 RX ADMIN — GABAPENTIN 300 MG: 300 CAPSULE ORAL at 18:22

## 2020-09-01 RX ADMIN — MAGNESIUM HYDROXIDE 10 ML: 2400 SUSPENSION ORAL at 12:50

## 2020-09-01 RX ADMIN — DOCUSATE SODIUM 100 MG: 100 CAPSULE, LIQUID FILLED ORAL at 10:34

## 2020-09-01 RX ADMIN — BUDESONIDE AND FORMOTEROL FUMARATE DIHYDRATE 2 PUFF: 80; 4.5 AEROSOL RESPIRATORY (INHALATION) at 09:55

## 2020-09-01 RX ADMIN — GABAPENTIN 300 MG: 300 CAPSULE ORAL at 22:35

## 2020-09-01 RX ADMIN — CETIRIZINE HYDROCHLORIDE 10 MG: 10 TABLET ORAL at 10:35

## 2020-09-01 RX ADMIN — PANTOPRAZOLE SODIUM 40 MG: 40 TABLET, DELAYED RELEASE ORAL at 08:25

## 2020-09-01 RX ADMIN — GABAPENTIN 300 MG: 300 CAPSULE ORAL at 10:34

## 2020-09-01 RX ADMIN — TIOTROPIUM BROMIDE INHALATION SPRAY 2 PUFF: 3.12 SPRAY, METERED RESPIRATORY (INHALATION) at 09:55

## 2020-09-01 RX ADMIN — POLYETHYLENE GLYCOL 3350 17 G: 17 POWDER, FOR SOLUTION ORAL at 10:35

## 2020-09-01 RX ADMIN — IMMUNE GLOBULIN (HUMAN) 20 G: 10 INJECTION INTRAVENOUS; SUBCUTANEOUS at 22:55

## 2020-09-01 RX ADMIN — BUDESONIDE AND FORMOTEROL FUMARATE DIHYDRATE 2 PUFF: 80; 4.5 AEROSOL RESPIRATORY (INHALATION) at 19:47

## 2020-09-01 RX ADMIN — DOCUSATE SODIUM 100 MG: 100 CAPSULE, LIQUID FILLED ORAL at 22:35

## 2020-09-01 NOTE — THERAPY DISCHARGE NOTE
Patient Name: Vianney Villarreal  : 1975    MRN: [id number]                              Today's Date: 2020       Admit Date: 2020    Visit Dx: No diagnosis found.  Patient Active Problem List   Diagnosis   • Neck pain of over 3 months duration   • DDD (degenerative disc disease), cervical   • Moderate asthma without complication   • Obesity (BMI 35.0-39.9 without comorbidity)   • Paresthesia   • Paresthesia and pain of both upper extremities   • GBS (Guillain [place] syndrome) (CMS/[facility])   • Environmental allergies   • Uterine fibroid   • Abnormal CT of the abdomen   • Constipation   • Abdominal pain     Past Medical History:   Diagnosis Date   • Acid reflux    • Asthma    • GERD (gastroesophageal reflux disease)      Past Surgical History:   Procedure Laterality Date   • ENDOSCOPY N/A    • ESOPHAGEAL DILATATION N/A    • TUBAL COAGULATION LAPAROSCOPIC Bilateral 7/15/2019    Procedure: LAPAROSCOPIC BILATERAL TUBAL LIGATION WITH FILSHIE CLIPS;  Surgeon: Jorgito Hallman MD;  [place] [facility] [place] [facility] [place]  Service: Obstetrics/Gynecology     General Information     Row Name 20          PT Evaluation Time/Intention    Document Type  evaluation;discharge evaluation/summary  -EJ     Mode of Treatment  physical therapy  -EJ     Row Name 20          General Information    Patient Profile Reviewed?  yes  -EJ     Prior Level of Function  independent:;ADL's;all household mobility;community mobility  -EJ     Existing Precautions/Restrictions  no known precautions/restrictions  -EJ     Barriers to Rehab  none identified  -EJ     Row Name 20          Relationship/Environment    Lives With  alone  -EJ     Row Name 20          Resource/Environmental Concerns    Current Living Arrangements  home/apartment/condo  -EJ     Row Name 20          Cognitive Assessment/Intervention- PT/OT    Orientation Status (Cognition)  oriented x 4  -EJ       User Key  (r) =  Recorded By, (t) = Taken By, (c) = Cosigned By    Initials Name Provider Type    Nahed White, PT Physical Therapist        Mobility     Row Name 09/01/20 0917          Bed Mobility Assessment/Treatment    Bed Mobility Assessment/Treatment  bed mobility (all) activities  -     [place] Level (Bed Mobility)  independent  -EJ     Row Name 09/01/20 0917          Sit-Stand Transfer    Sit-Stand [place] (Transfers)  independent  -     Row Name 09/01/20 0917          Gait/Stairs Assessment/Training    Gait/Stairs Assessment/Training  gait/ambulation independence  -EJ     [place] Level (Gait)  independent  -EJ     Distance in Feet (Gait)  800  -EJ       User Key  (r) = Recorded By, (t) = Taken By, (c) = Cosigned By    Initials Name Provider Type    Nahed White, PT Physical Therapist        Obj/Interventions     Row Name 09/01/20 0919          General ROM    GENERAL ROM COMMENTS  WFL  -     Row Name 09/01/20 0919          MMT (Manual Muscle Testing)    General MMT Comments  WFL  -EJ       User Key  (r) = Recorded By, (t) = Taken By, (c) = Cosigned By    Initials Name Provider Type    Nahed White, PT Physical Therapist        Goals/Plan    No documentation.       Clinical Impression     Row Name 09/01/20 0920          Pain Assessment    Additional Documentation  Pain Scale: Numbers Pre/Post-Treatment (Group)  -     Row Name 09/01/20 0920          Pain Scale: Numbers Pre/Post-Treatment    Pain Scale: Numbers, Pretreatment  0/10 - no pain  -     Pain Scale: Numbers, Post-Treatment  0/10 - no pain  -     Row Name 09/01/20 0920          Plan of Care Review    Plan of Care Reviewed With  patient  -EJ     Progress  improving  -     Outcome Summary  Pt doing well this am and agreeable to PT. Pt was admitted on 8/29/2020 with parathesis and possilbe GBS.  Pt complains of numbness in extremities, but is actually moving quite well at this time. Pt lives alone in a condo, but  states her daughter is available to stay with pt if needed. Today, pt able to ambulate over 800 ft independently with no LOB or unsteadiness. pt is up in room ad lyndon. There are no acute PT needs at this time. Encouraged pt to ambulate 3x/day with nsg. She verbalized understanding. PT will sign off.  -     Row Name 09/01/20 0920          Physical Therapy Clinical Impression    Patient/Family Goals Statement (PT Clinical Impression)  home  -EJ     Row Name 09/01/20 0920          Positioning and Restraints    Pre-Treatment Position  in bed  -EJ     Post Treatment Position  chair  -EJ     In Chair  notified nsg;reclined;call light within reach;encouraged to call for assist  -EJ       User Key  (r) = Recorded By, (t) = Taken By, (c) = Cosigned By    Initials Name Provider Type    Nahed White, PT Physical Therapist        Outcome Measures     Row Name 09/01/20 0922          How much help from another person do you currently need...    Turning from your back to your side while in flat bed without using bedrails?  4  -EJ     Moving from lying on back to sitting on the side of a flat bed without bedrails?  4  -EJ     Moving to and from a bed to a chair (including a wheelchair)?  4  -EJ     Standing up from a chair using your arms (e.g., wheelchair, bedside chair)?  4  -EJ     Climbing 3-5 steps with a railing?  4  -EJ     To walk in hospital room?  4  -EJ     AM-PAC 6 Clicks Score (PT)  24  -     Row Name 09/01/20 0922          Functional Assessment    Outcome Measure Options  AM-PAC 6 Clicks Basic Mobility (PT)  -EJ       User Key  (r) = Recorded By, (t) = Taken By, (c) = Cosigned By    Initials Name Provider Type    Nahed White, PT Physical Therapist          PT Recommendation and Plan     Outcome Summary/Treatment Plan (PT)  Anticipated Discharge Disposition (PT): home  Plan of Care Reviewed With: patient  Progress: improving  Outcome Summary: Pt doing well this am and agreeable to PT. Pt was  admitted on 8/29/2020 with parathesis and possilbe GBS.  Pt complains of numbness in extremities, but is actually moving quite well at this time. Pt lives alone in a condo, but states her daughter is available to stay with pt if needed. Today, pt able to ambulate over 800 ft independently with no LOB or unsteadiness. pt is up in room ad [place]. There are no acute PT needs at this time. Encouraged pt to ambulate 3x/day with nsg. She verbalized understanding. PT will sign off.     Time Calculation:   PT Charges     Row Name 09/01/20 0923             Time Calculation    Start Time  0845  -EJ      Stop Time  0906  -EJ      Time Calculation (min)  21 min  -EJ      PT Received On  09/01/20  -EJ         Time Calculation- PT    Total Timed Code Minutes- PT  10 minute(s)  -EJ        User Key  (r) = Recorded By, (t) = Taken By, (c) = Cosigned By    Initials Name Provider Type    Nahed White, PT Physical Therapist        Therapy Charges for Today     Code Description Service Date Service Provider Modifiers Qty    71747001065 HC PT EVAL LOW COMPLEXITY 2 9/1/2020 Nahed Ramirez, PT GP 1    87863493130 HC PT THER PROC EA 15 MIN 9/1/2020 Nahed Ramirez, PT GP 1          PT G-Codes  Outcome Measure Options: AM-PAC 6 Clicks Basic Mobility (PT)  AM-PAC 6 Clicks Score (PT): 24    PT Discharge Summary  Anticipated Discharge Disposition (PT): home  Reason for Discharge: At baseline function, Independent  Discharge Destination: Home    Nahed Ramirez PT  9/1/2020

## 2020-09-01 NOTE — PROGRESS NOTES
"DOS: 2020  NAME: Vianney Villarreal   : 1975  PCP: Gurwinder Almazan MD  No chief complaint on file.        Subjective:   -Stable overnight, no issue to address by the caring nurse.  -Patient initial IVIG has been given according to ideal body weight.  That has been corrected by the pharmacist and extra dose of IVIG has been given to the patient today.  She is a scheduled for 5 doses in total.  -Patient reported spells of fainting-like attacks during the IV Ig infusion.  That had not been accompanied by any decrease of blood pressure, or heart rate.  Patient caring nurse had been with her during this spells.  Each 1 lasted for a few seconds.  Most likely to be secondary to anxiety.  Caring nurse requested to give the IVIG at earlier time to enable her daughter to accompany her during the infusion.  -I had long discussion with her daughter.  All her questions which may be more than 25 qs had been answered.    Vital signs: /79 (BP Location: Right arm, Patient Position: Sitting)   Pulse 95   Temp 98 °F (36.7 °C) (Oral)   Resp 16   Ht 167.6 cm (65.98\")   Wt 110 kg (242 lb)   LMP  (LMP Unknown)   SpO2 100%   Breastfeeding No   BMI 39.08 kg/m²      Gen: Lying in bed comfortable, not in pain or distress.    MS: Alert, oriented, fluent, cooperative, and attentive.    CN: Grossly intact from 2-12.  Vision: Normal on both eyes.  Pupils: Normal in size equal and reactive.  Motor: Age-appropriate on her 4 extremities.  Sensory: Tactile allover.  Coordination: Appropriate on both upper and lower extremities.  Gait: Patient to walk without any hesitancy, weakness or imbalance.          Laboratory results:  Lab Results   Component Value Date    GLUCOSE 101 (H) 2020    CALCIUM 9.5 2020     2020    K 4.2 2020    CO2 22.3 2020     2020    BUN 9 2020    CREATININE 0.81 2020    EGFRIFAFRI 94 2020    BCR 11.1 2020    ANIONGAP 10.7 2020 "     Lab Results   Component Value Date    WBC 4.32 08/31/2020    HGB 12.9 08/31/2020    HCT 37.0 08/31/2020    MCV 88.7 08/31/2020     08/31/2020     Lab Results   Component Value Date    CHOL 199 08/11/2020    CHOL 160 06/14/2018     Lab Results   Component Value Date    HDL 61 (H) 08/11/2020    HDL 66 (H) 06/14/2018     Lab Results   Component Value Date     (H) 08/11/2020    LDL 79 06/14/2018     Lab Results   Component Value Date    TRIG 144 08/11/2020    TRIG 76 06/14/2018     @hgba1c@     Review of labs: No significant abnormality.    Review and interpretation of imaging: No new imaging done.      Assessment:  1.  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  Patient started on IVIG.  No side effect or adverse reaction has been reported.        Plan:  1.  Continue with the current medication and care.

## 2020-09-01 NOTE — PROGRESS NOTES
Discharge Planning Assessment  [facility]     Patient Name: Vianney Villarreal  MRN: [id number]  Today's Date: 9/1/2020    Admit Date: 8/29/2020    Discharge Needs Assessment     Row Name 09/01/20 1321       Living Environment    Lives With  alone    Current Living Arrangements  home/apartment/condo    Primary Care Provided by  self    Provides Primary Care For  no one    Family Caregiver if Needed  child(stephanie), adult    Family Caregiver Names  daughter, Mojgan Koch, [phone]    Quality of Family Relationships  helpful;involved;supportive    Able to Return to Prior Arrangements  yes       Resource/Environmental Concerns    Resource/Environmental Concerns  none       Transition Planning    Patient/Family Anticipates Transition to  home    Patient/Family Anticipated Services at Transition  none    Transportation Anticipated  family or friend will provide       Discharge Needs Assessment    Concerns to be Addressed  no discharge needs identified;denies needs/concerns at this time    Equipment Currently Used at Home  none    Discharge Coordination/Progress  Home        Discharge Plan     Row Name 09/01/20 1322       Plan    Plan  Home, denies needs    Patient/Family in Agreement with Plan  yes    Plan Comments  CCP spoke with pt to verify information and discuss d/c planning. [facility], uses no DME, and has no history of HH/SNF. Pt was participating in outpatient PT at [facility] recently. Pt uses [facility] but agrees to Meds to Beds enrollment for d/c prescriptions (confirmed in EPIC). Pt anticipates no needs at d/c. PT has signed off. CCP to follow to assist as needed. Elke Pollard LCSW        Destination      Coordination has not been started for this encounter.      Durable Medical Equipment      Coordination has not been started for this encounter.      Dialysis/Infusion      Coordination has not been started for this encounter.      Home Medical  Care      Coordination has not been started for this encounter.      Therapy      Coordination has not been started for this encounter.      Community Resources      Coordination has not been started for this encounter.          Demographic Summary     Row Name 09/01/20 1321       General Information    Admission Type  inpatient    Arrived From  home    Referral Source  admission list    Reason for Consult  discharge planning    Preferred Language  English        Functional Status     Row Name 09/01/20 1321       Functional Status    Usual Activity Tolerance  excellent    Current Activity Tolerance  excellent       Functional Status, IADL    Medications  independent    Meal Preparation  independent    Housekeeping  independent    Laundry  independent    Shopping  independent       Mental Status Summary    Recent Changes in Mental Status/Cognitive Functioning  no changes        Psychosocial    No documentation.       Abuse/Neglect    No documentation.       Legal    No documentation.       Substance Abuse    No documentation.       Patient Forms    No documentation.           Darlene Pollard LCSW

## 2020-09-01 NOTE — PLAN OF CARE
Problem: Patient Care Overview  Goal: Plan of Care Review  Flowsheets (Taken 9/1/2020 0920)  Progress: improving  Plan of Care Reviewed With: patient  Outcome Summary: Pt doing well this am and agreeable to PT. Pt was admitted on 8/29/2020 with parathesis and possilbe GBS.  Pt complains of numbness in extremities, but is actually moving quite well at this time. Pt lives alone in a condo, but states her daughter is available to stay with pt if needed. Today, pt able to ambulate over 800 ft independently with no LOB or unsteadiness. pt is up in room ad lyndon. There are no acute PT needs at this time. Encouraged pt to ambulate 3x/day with nsg. She verbalized understanding. PT will sign off.   Patient was intermittently wearing a face mask during this therapy encounter. Therapist used appropriate personal protective equipment including eye protection, mask, and gloves.  Mask used was standard procedure mask. Appropriate PPE was worn during the entire therapy session. Hand hygiene was completed before and after therapy session. Patient is not in enhanced droplet precautions.

## 2020-09-01 NOTE — CONSULTS
GENERAL SURGERY HISTORY AND PHYSICAL     SUMMARY (A/P):    Ms. Vianney Villarreal is a 45 y.o. year old lady who presents with likely GBS.  She complains of intermittent upper quadrant muscle spasms.  CT scan shows constipation; will add bowel regimen.  Incidental appendicolith, but no signs appendicitis.  With her normal white blood cell count, lack of fever, and nonspecific abdominal pain, do not believe there is a current indication for appendectomy especially considering her ongoing neurologic work-up.  Will continue to follow.     CC:    Paresthesias and weakness, abdominal pain    HPI:    Ms. Vianney Villarreal is a 45 y.o. year old lady who presented 2 days ago with worsening paresthesias in her upper and lower extremities.  She is undergoing work-up for GBS including a lumbar puncture today.  She also complains of 5-day history of intermittent episodes of severe abdominal pain.  They are mainly in the bilateral upper quadrants.  They last up to an hour.  She states they feel like muscle spasms.  She has been tolerating a diet and is eating chicken on my exam.  She has also not had a real bowel movement in several days.  She had a very small one yesterday and the day before.  No nausea or vomiting.    PMH:    • Asthma  • GERD    PSH:    • Tubal ligation    FAMILY HISTORY:    • No family history of colon cancer    SOCIAL HISTORY:   • Denies tobacco use  • Denies alcohol use    ALLERGIES:   • None    MEDICATIONS:   • Reviewed in epic    RADIOLOGY/ENDOSCOPY:    CT scan reviewed: Stool burden throughout the colon, appendicolith with no signs appendicitis, no stranding per inflammatory changes, small ventral hernia with no intra-abdominal contents    LABS:    • White blood cell count 4.3, hemoglobin 12.9, platelets 287    ROS:   Influenza-like illness: no fever, no  cough, no  sore throat, no  body aches, no loss of sense of taste or smell, no known exposure to person with Covid-19.  Constitutional: Negative for fevers  or chills  HENT: Negative for hearing loss or runny nose  Eyes: Negative for vision changes or scleral icterus  Respiratory: Negative for cough or shortness of breath  Cardiovascular: Negative for chest pain or heart palpitations  Gastrointestinal: Positive for intermittent sharp abdominal pain and constipation; negative for nausea, vomiting, melena, or hematochezia  Genitourinary: Negative for hematuria or dysuria  Musculoskeletal: Negative for joint swelling or gait instability  Neurologic: Negative for tremors or seizures; positive for paresthesias  Psychiatric: Negative for suicidal ideations or depression  All other systems reviewed and negative    PHYSICAL EXAM:   • Constitutional: Well-developed well-nourished, no acute distress  • Vital signs: Temperature 98.5 heart rate 84 respiratory rate 20 blood pressure 114/74  • Eyes: Conjunctiva normal, sclera nonicteric  • ENMT: Hearing grossly normal, oral mucosa moist  • Neck: Supple, no palpable mass, trachea midline  • Respiratory: Clear to auscultation, normal inspiratory effort  • Cardiovascular: Regular rate, no murmur, no peripheral edema, no jugular venous distention  • Gastrointestinal: Soft, minimally tender to palpation in bilateral upper quadrants, no tenderness in the midepigastrium or the right lower quadrant, no peritoneal signs, no hernia palpated  • Skin:  Warm, dry, no rash on visualized skin surfaces  • Musculoskeletal: Symmetric strength, normal gait  • Psychiatric: Alert and oriented ×3, normal affect     CHELSEA SALMON M.D.  General and Endoscopic Surgery  [facility]    [address], [address]  [place], [place], [address]  P: [phone]  F: [phone]

## 2020-09-01 NOTE — CONSULTS
Referring Provider: CHANDRAKANT Washburn    Reason for Consultation: CMV + GBS    History of present illness:  Vianney Villarreal is a 45 y.o. who I am asked to evaluate and give opinion for CMV + GBS. History is obtained from the patient, her daughter, RN, and review of the old medical records which I summarize/synthesize as follows: She presented to [facility] on 8/29/20 with about 1 month of numbness and tingling in the hands and feet. She denies fever, chills, sweats, and abdominal pain. No nausea, vomiting, or diarrhea. No sick contacts at home (lives alone) though did see her daughter and granddaughter intermittently. Her daughter had a transient illness (unknown diagnosis) but it doesn't sound like they had any prolonged contact and the baby has been healthy.     Since admission, she has been seen by neurology and diagnosed with Guillain Easley syndrome. Primary team checked CMV serologies and her CMV IgM and IgG were positive so ID consulted for further recommendations. She was given a dose of IVIg last night but felt funny during it so it was stopped long-term through treatment. The CMV labs were drawn before the IVIg was given.     She denies having any immunocompromising conditions. No diabetes or HIV. No history of transplant. She's felt some abdominal pain since admission with some constipation. LFTs have been normal.     Past Medical History:   Diagnosis Date   • Acid reflux    • Asthma    • GERD (gastroesophageal reflux disease)        Past Surgical History:   Procedure Laterality Date   • ENDOSCOPY N/A 2014   • ESOPHAGEAL DILATATION N/A 2014   • TUBAL COAGULATION LAPAROSCOPIC Bilateral 7/15/2019    Procedure: LAPAROSCOPIC BILATERAL TUBAL LIGATION WITH FILSHIE CLIPS;  Surgeon: Jorgito Hallman MD;  [place] [facility] [place] [facility] [place]  Service: Obstetrics/Gynecology       Social History:  Lives alone    Family History:  No 1st degree relatives w/ GBS    Antibiotic allergies and intolerances:   None    Medications:    Current Facility-Administered Medications:   •  aluminum-magnesium hydroxide-simethicone (MAALOX MAX) 400-400-40 MG/5ML suspension 15 mL, 15 mL, Oral, Q6H PRN, Eduardo Treviño APRN  •  bisacodyl (DULCOLAX) EC tablet 5 mg, 5 mg, Oral, Daily PRN, Eduardo Treviño APRN  •  bisacodyl (DULCOLAX) suppository 10 mg, 10 mg, Rectal, Daily PRN, Eduardo Treviño APRN, 10 mg at 08/30/20 2051  •  budesonide-formoterol (SYMBICORT) 80-4.5 MCG/ACT inhaler 2 puff, 2 puff, Inhalation, BID - RT, Nolan Weaver MD, 2 puff at 09/01/20 0955  •  cetirizine (zyrTEC) tablet 10 mg, 10 mg, Oral, Daily, Holly Andrea APRN, 10 mg at 09/01/20 1035  •  diphenhydrAMINE (BENADRYL) injection 50 mg, 50 mg, Intravenous, Once PRN, Naresh Thurston MD  •  docusate sodium (COLACE) capsule 100 mg, 100 mg, Oral, BID, Rose Mary Smith MD, 100 mg at 09/01/20 1034  •  famotidine (PEPCID) injection 20 mg, 20 mg, Intravenous, Once PRN, Naresh Thurston MD  •  gabapentin (NEURONTIN) capsule 300 mg, 300 mg, Oral, TID, Gómez Nichols MD, 300 mg at 09/01/20 1034  •  Hold medication, 1 each, Does not apply, Continuous PRN, Gómez Nichols MD  •  HYDROcodone-acetaminophen (NORCO) 5-325 MG per tablet 1 tablet, 1 tablet, Oral, Q6H PRN, Holly Andrea APRN  •  hydrocortisone sodium succinate (Solu-CORTEF) injection 100 mg, 100 mg, Intravenous, Once PRN, Naresh Thurston MD  •  [START ON 9/2/2020] immune globulin (human) (GAMUNEX-C) infusion 20 g, 20 g, Intravenous, Q24H **AND** [START ON 9/2/2020] immune globulin (human) (GAMUNEX-C) infusion 10 g, 10 g, Intravenous, Q24H, Naresh Thurston MD  •  immune globulin (human) (GAMUNEX-C) infusion 20 g, 20 g, Intravenous, Once **AND** immune globulin (human) (GAMUNEX-C) infusion 20 g, 20 g, Intravenous, Once, Naresh Thurston MD  •  magnesium hydroxide (MILK OF MAGNESIA) suspension 2400 mg/10mL 10 mL, 10 mL, Oral, Daily, Rose Mary Smith MD, 10 mL at 09/01/20 1250  •  metoclopramide  (REGLAN) tablet 5 mg, 5 mg, Oral, TID PRN, Naresh Thurston MD, 5 mg at 08/31/20 2223  •  morphine injection 4 mg, 4 mg, Intravenous, Q4H PRN, Nolan Weaver MD, 4 mg at 08/30/20 1903  •  ondansetron (ZOFRAN) tablet 4 mg, 4 mg, Oral, Q6H PRN **OR** ondansetron (ZOFRAN) injection 4 mg, 4 mg, Intravenous, Q6H PRN, Eduardo Treviño APRN, 4 mg at 08/30/20 1213  •  pantoprazole (PROTONIX) EC tablet 40 mg, 40 mg, Oral, Q AM, Nolan Weaver MD, 40 mg at 09/01/20 0825  •  Pharmacy to enter IVIG order 45,000 mg, 400 mg/kg, Intravenous, Daily, Naresh Thurston MD  •  polyethylene glycol (MIRALAX) packet 17 g, 17 g, Oral, Daily, Nolan Weaver MD, 17 g at 09/01/20 1035  •  potassium chloride (KLOR-CON) packet 40 mEq, 40 mEq, Oral, PRN, Nolan Weaver MD  •  potassium chloride (MICRO-K) CR capsule 40 mEq, 40 mEq, Oral, PRN, Nolan Weaver MD, 40 mEq at 08/29/20 1633  •  sodium chloride 0.9 % flush 10 mL, 10 mL, Intravenous, PRN, Eduardo Treviño APRN, 10 mL at 08/31/20 0945  •  temazepam (RESTORIL) capsule 15 mg, 15 mg, Oral, Once, Gómez Nichols MD  •  temazepam (RESTORIL) capsule 15 mg, 15 mg, Oral, Nightly PRN, Gómez Nichols MD  •  tiotropium (SPIRIVA RESPIMAT) 2.5 mcg/act aerosol solution inhaler, 2 puff, Inhalation, Daily - RT, Nolan Weaver MD, 2 puff at 09/01/20 0955    Review of Systems  All systems were reviewed and are negative unless otherwise stated above in the HPI    Objective   Vital Signs   Temp:  [97.6 °F (36.4 °C)-98.5 °F (36.9 °C)] 98.5 °F (36.9 °C)  Heart Rate:  [] 99  Resp:  [16-20] 16  BP: (104-134)/() 118/79    Physical Exam:   General: awake, alert, NAD, very nice  Head: atraumatic  Eyes: Pupils equal, no scleral icterus  ENT: MMM, OP clear, no thrush.   Cardiovascular: NR, RR, no murmurs, no LE edema  Respiratory: Lungs are clear to auscultation bilaterally, no rales or wheezing; normal work of breathing on ambient air  GI: Abdomen is soft, mildly tender and distended, normal  bowel sounds in all four quadrants  : no Castanon catheter present  Musculoskeletal: normal musculature  Skin: No rashes  Neurological: Alert and oriented x 3, motor strength 5/5 in all four extremities  Psychiatric: Normal mood and affect     Labs:     Lab Results   Component Value Date    WBC 4.32 08/31/2020    HGB 12.9 08/31/2020    HCT 37.0 08/31/2020    MCV 88.7 08/31/2020     08/31/2020       Lab Results   Component Value Date    GLUCOSE 101 (H) 08/31/2020    BUN 9 08/31/2020    CREATININE 0.81 08/31/2020    EGFRIFAFRI 94 08/31/2020    BCR 11.1 08/31/2020    CO2 22.3 08/31/2020    CALCIUM 9.5 08/31/2020    ALBUMIN 4.10 08/29/2020    AST 28 08/29/2020    ALT 28 08/29/2020     CMV IGM and IGG positive  EBV serology pending  RPR nonreactive    LP results  0 nucleated cells  Pro 71  Glc 72    Microbiology:  8/29 COVID; negative    Radiology (personally reviewed images and report):  CT A/P with small ventral hernia, tubal ligations clips. appendicolith    MRI L spine with mild DJD L spine; no evidence of infection    MRI mariely negative for infarct; possible mucus cyst; possible acute sinusitis    Assessment/Plan   1. Guillain [place] Syndrome  2. Obesity BMI 39  3. CMV + IgM and IgG    She has been diagnosed with GBS by neurology and IVIg therapy has been ordered. It is true that about 10% of cases of GBS are due to CMV. That being said, I'm not sure it's clear that she has CMV. She's had no prodrome of viral illness, no fever, no abnormal LFTs, and no atypical lymphocytosis. Her IgG is positive suggestive of prior infection. IgM is positive and could be consistent with recent infection but IgM antibodies are notoriously unreliable. I'm going to clear the air and check a CMV PCR. In general, acute CMV in immunocompetent hosts is not treated with antivirals (ganciclovir or valgancyclovir) as risks often outweigh benefits. An immunocompetent host usually clears this infection on his or her own. I'll check an EBV  PCR and HIV Ab as well.     Thank you for this consult. ID will follow. I discussed the patients findings and my recommendations with her daughter and RN.

## 2020-09-01 NOTE — THERAPY EVALUATION
Acute Care - Speech Language Pathology   Swallow Initial Evaluation [facility]     Patient Name: Vianney Villarreal  : 1975  MRN: [id number]  Today's Date: 2020               Admit Date: 2020    Visit Dx:   No diagnosis found.  Patient Active Problem List   Diagnosis   • Neck pain of over 3 months duration   • DDD (degenerative disc disease), cervical   • Moderate asthma without complication   • Obesity (BMI 35.0-39.9 without comorbidity)   • Paresthesia   • Paresthesia and pain of both upper extremities   • GBS (Guillain Hettinger syndrome) (CMS/HCC)   • Environmental allergies   • Uterine fibroid   • Abnormal CT of the abdomen   • Constipation   • Abdominal pain     Past Medical History:   Diagnosis Date   • Acid reflux    • Asthma    • GERD (gastroesophageal reflux disease)      Past Surgical History:   Procedure Laterality Date   • ENDOSCOPY N/A    • ESOPHAGEAL DILATATION N/A    • TUBAL COAGULATION LAPAROSCOPIC Bilateral 7/15/2019    Procedure: LAPAROSCOPIC BILATERAL TUBAL LIGATION WITH FILSHIE CLIPS;  Surgeon: Jorgito Hallman MD;  [place]  [facility] [place] [facility] [place];  Service: Obstetrics/Gynecology        SWALLOW EVALUATION (last 72 hours)      SLP Adult Swallow Evaluation     Row Name 20 1028                   Rehab Evaluation    Document Type  discharge evaluation/summary  -OC        Subjective Information  no complaints  -OC        Patient Observations  alert;agree to therapy;cooperative  -OC        Patient Effort  good  -OC        Symptoms Noted During/After Treatment  none  -OC           General Information    Patient Profile Reviewed  yes  -OC        Pertinent History Of Current Problem  Patient admitted for paresthesia and pain bilateral upper extremeties related to Guillian Hettinger. Hx of EGD in  per patient report  -OC        Current Method of Nutrition  regular textures;thin liquids  -OC        Precautions/Limitations, Vision  WFL  -OC        Precautions/Limitations, Hearing   WFL  -OC        Prior Level of Function-Communication  WFL  -OC        Prior Level of Function-Swallowing  no diet consistency restrictions  -OC        Plans/Goals Discussed with  patient;agreed upon  -OC        Barriers to Rehab  none identified  -OC        Patient's Goals for Discharge  patient did not state  -OC           Pain Scale: Numbers Pre/Post-Treatment    Pain Scale: Numbers, Pretreatment  0/10 - no pain  -OC        Pain Scale: Numbers, Post-Treatment  0/10 - no pain  -OC           Oral Motor and Function    Dentition Assessment  natural, present and adequate  -OC        Secretion Management  WNL/WFL  -OC        Mucosal Quality  moist, healthy  -OC        Volitional Swallow  WFL  -OC        Volitional Cough  WFL  -OC           Oral Musculature and Cranial Nerve Assessment    Oral Motor General Assessment  WFL  -OC           Clinical Swallow Eval    Clinical Swallow Evaluation Summary  Patient demonstrated no overt s/s aspiration with thin via cup/straw, puree, mechanical soft, and regular texture. Patient demonstrated appriate self feeding and rate of intake. Patient reports occasional difficulty or complain of food sticking at the level of the sternum. Patient reports she feels like she needs another EGD. Recommend GI consult or follow up with GI as outpatient after DC.   -OC           Clinical Impression    SLP Swallowing Diagnosis  functional oral phase;functional pharyngeal phase  -OC        Functional Impact  no impact on function  -OC        Rehab Potential/Prognosis, Swallowing  good, to achieve stated therapy goals  -OC        Swallow Criteria for Skilled Therapeutic Interventions Met  baseline status  -OC           Recommendations    Therapy Frequency (Swallow)  evaluation only  -OC        Predicted Duration Therapy Intervention (Days)  until discharge  -OC        SLP Diet Recommendation  regular textures;thin liquids  -OC        Recommended Precautions and Strategies  upright posture  "during/after eating  -OC        SLP Rec. for Method of Medication Administration  meds whole;with thin liquids;as tolerated  -OC        Monitor for Signs of Aspiration  yes;notify SLP if any concerns  -OC        Anticipated Dischage Disposition (SLP)  unknown  -OC          User Key  (r) = Recorded By, (t) = Taken By, (c) = Cosigned By    Initials Name Effective Dates    OC Inna Bhatt MA,[facility]-SLP 06/08/18 -           EDUCATION  The patient has been educated in the following areas:   Dysphagia (Swallowing Impairment).    SLP Recommendation and Plan  SLP Swallowing Diagnosis: functional oral phase, functional pharyngeal phase  SLP Diet Recommendation: regular textures, thin liquids  Recommended Precautions and Strategies: upright posture during/after eating  SLP Rec. for Method of Medication Administration: meds whole, with thin liquids, as tolerated     Monitor for Signs of Aspiration: yes, notify SLP if any concerns     Swallow Criteria for Skilled Therapeutic Interventions Met: baseline status  Anticipated Dischage Disposition (SLP): unknown  Rehab Potential/Prognosis, Swallowing: good, to achieve stated therapy goals  Therapy Frequency (Swallow): evaluation only  Predicted Duration Therapy Intervention (Days): until discharge       Plan of Care Reviewed With: patient  Outcome Summary: Clinical swallow eval completed. Recommend regular textures and thin liquids, meds whole with thin liquids. Recommend upright, slow rate, and alternate liquids/solids. Patient reports food \"sticking\" at level of sternum, consider GI consult versus GI follow up as outpatient. ST to s/o at this time, please re-consult as appropriate.         SLP Outcome Measures (last 72 hours)      SLP Outcome Measures     Row Name 09/01/20 1037             SLP Outcome Measures    Outcome Measure Used?  Adult NOMS  -OC         Adult FCM Scores    FCM Chosen  Swallowing  -OC      Swallowing FCM Score  7  -OC        User Key  (r) = Recorded By, (t) = " Taken By, (c) = Cosigned By    Initials Name Effective Dates    Inna Gardiner MA, CCC-LEA 06/08/18 -            Time Calculation:   Time Calculation- SLP     Row Name 09/01/20 1038             Time Calculation- SLP    SLP Start Time  0945  -OC      SLP Received On  09/01/20  -OC        User Key  (r) = Recorded By, (t) = Taken By, (c) = Cosigned By    Initials Name Provider Type    Inna Gardiner MA,KRISTINE-SLP Speech and Language Pathologist          Therapy Charges for Today     Code Description Service Date Service Provider Modifiers Qty    [id number]  ST EVAL ORAL PHARYNG SWALLOW 3 9/1/2020 Inna Bhatt MA,KRISTINE-SLP GN 1               Inna Bhatt MA, CCC-SLP  9/1/2020

## 2020-09-01 NOTE — PROGRESS NOTES
Progress Note    Name: Vianney Villarreal ADMIT: 2020   : 1975  PCP: Gurwinder Almazan MD    MRN: [id number] LOS: 3 days   AGE/SEX: 45 y.o. female  ROOM: P598/1   Date of Encounter Visit: 20    Subjective:     Interval History: had some tachycardia and near syncope symptoms with IVIG lizbet with increase in dose and was not able to tolerate full treatment. Speech evaluated and no sign of aspiration.     REVIEW OF SYSTEMS:   no fever or chills.  No chest pain, palpitations or edema.   No shortness of breath. Occasional nonproductive cough.   No vomiting or diarrhea. Nausea. Abdominal cramps again this am after breakfast, but not as intense as previously.   Heavy feeling in legs and difficult walking with no real improvment.   Numbness sensations in upper and lower extremities at times. Reports difficulty swallowing some foods at home    Objective:   Temp:  [97.6 °F (36.4 °C)-98.5 °F (36.9 °C)] 98.5 °F (36.9 °C)  Heart Rate:  [] 99  Resp:  [16-20] 16  BP: (104-134)/() 118/79   SpO2:  [95 %-100 %] 99 %  on    Device (Oxygen Therapy): room air    Intake/Output Summary (Last 24 hours) at 2020 1258  Last data filed at 2020 0149  Gross per 24 hour   Intake 109.17 ml   Output --   Net 109.17 ml     Body mass index is 39.08 kg/m².      20  0100   Weight: 110 kg (242 lb)     Weight change:     Physical Exam   Constitutional: No distress.   HENT:   Head: Atraumatic.   Nose: Nose normal.   Slight right sided facial droop at rest. Did have some choking with water through a straw, but was lying down due to HOB restrictions.   Eyes: Conjunctivae are normal.   Cardiovascular: Regular rhythm.   No murmur heard.  Borderline tachycardic   Pulmonary/Chest: Effort normal. She has no wheezes. She has no rales.   Diminished bases   Abdominal: Soft. Bowel sounds are normal. She exhibits distension (mild but soft). There is tenderness (generalized and worse in RLQ and epigastric region).    Musculoskeletal: She exhibits no edema.   Neurological: She is alert.   areflexia BLE    Skin: Skin is warm and dry. She is not diaphoretic.       Results Review:      Results from last 7 days   Lab Units 08/31/20  0707 08/30/20 0611 08/29/20 2007 08/29/20  0541   SODIUM mmol/L 137 137  --  139   POTASSIUM mmol/L 4.2 4.1 3.7 3.5   CHLORIDE mmol/L 104 107  --  107   CO2 mmol/L 22.3 22.6  --  21.4*   BUN mg/dL 9 8  --  7   CREATININE mg/dL 0.81 0.65  --  0.66   GLUCOSE mg/dL 101* 99  --  99   CALCIUM mg/dL 9.5 9.2  --  9.1   AST (SGOT) U/L  --   --   --  28   ALT (SGPT) U/L  --   --   --  28     Estimated Creatinine Clearance: 112.5 mL/min (by C-G formula based on SCr of 0.81 mg/dL).      Results from last 7 days   Lab Units 08/29/20  1857   GLUCOSE mg/dL 94     Results from last 7 days   Lab Units 08/29/20 2007   TROPONIN T ng/mL <0.010         Results from last 7 days   Lab Units 08/29/20 0541   TSH uIU/mL 0.984     Results from last 7 days   Lab Units 08/30/20 0611   MAGNESIUM mg/dL 2.2           Invalid input(s): LDLCALC  Results from last 7 days   Lab Units 08/31/20  1816 08/31/20  0707 08/30/20 0611 08/29/20  0541   WBC 10*3/mm3 4.32 4.09 4.80  --  4.11   HEMOGLOBIN g/dL 12.9 12.7 12.9  --  12.8   HEMATOCRIT % 37.0 37.5 38.1  --  38.4   PLATELETS 10*3/mm3 287 295 290  --  303   MCV fL 88.7 90.4 91.4  --  90.8   MCH pg 30.9 30.6 30.9  --  30.3   MCHC g/dL 34.9 33.9 33.9  --  33.3   RDW % 12.4 12.3 12.3  --  12.2*   RDW-SD fl 40.2 40.2 41.3  --  40.5   MPV fL 9.7 10.2 9.9  --  10.0   NEUTROPHIL % % 38.0* 42.8 42.0*   < >  --    LYMPHOCYTE % % 47.7* 40.8 42.3   < >  --    MONOCYTES % % 9.5 10.5 11.0   < >  --    EOSINOPHIL % % 3.7 4.2 3.5   < >  --    BASOPHIL % % 0.9 1.5 1.0   < >  --    IMM GRAN % % 0.2 0.2 0.2   < >  --    NEUTROS ABS 10*3/mm3 1.64* 1.75 2.01   < >  --    LYMPHS ABS 10*3/mm3 2.06 1.67 2.03   < >  --    MONOS ABS 10*3/mm3 0.41 0.43 0.53   < >  --    EOS ABS 10*3/mm3 0.16 0.17 0.17   < >   --    BASOS ABS 10*3/mm3 0.04 0.06 0.05   < >  --    IMMATURE GRANS (ABS) 10*3/mm3 0.01 0.01 0.01   < >  --    NRBC /100 WBC 0.0 0.0 0.0   < >  --     < > = values in this interval not displayed.                                 Results from last 7 days   Lab Units 08/30/20  0909   NITRITE UA  Negative           Imaging:  Imaging Results (Last 24 Hours)     Procedure Component Value Units Date/Time    IR Lumbar Puncture Diag/Thera [[id number]] Collected:  08/31/20 1518     Updated:  08/31/20 1524    Narrative:       FLUOROSCOPIC GUIDED LUMBAR PUNCTURE      ACCESSION: [id number]     DATE: 8/31/2020 2:15 PM     EXAM REASON:  Guillain [place] Syndrome.      COMPARISON: MRI 08/26/2020     PROCEDURE:   The procedure with its associated risks and benefits was explained to  the patient and signed consent obtained with a witness present.   The patient was placed in a prone position on the fluoroscopy table. The  skin surface for access to the thecal sac at the level of L3-4 was  identified fluoroscopically and marked. The area was prepped and draped  in the usual sterile fashion. 1% lidocaine was utilized for local  anesthesia.   The tip of a 22-gauge x 7 inch spinal needle was then advanced into the  lumbar thecal sac at level of L3-4 using intermittent fluoroscopic  guidance. Needle tip position was confirmed by noting clear spinal fluid  in the needle hub. A total of 10 mL clear cerebrospinal fluid was then  obtained via gravity drainage and sent to the clinical laboratories for  analysis as requested by the clinical service.    The needle was removed and direct pressure was applied for a few  minutes. A sterile dressing was applied. There were no immediate  complications.  All elements of maximal sterile technique were followed.  1 minute 5 seconds fluoroscopy time, 31.1 mGy, 3 stored images.       Impression:       Successful lumbar puncture.     This report was finalized on 8/31/2020 3:21 PM by Dr. Sander Ortiz M.D.              I reviewed the patient's new clinical results and medications.         Medication Review:   Scheduled Meds:    budesonide-formoterol 2 puff Inhalation BID - RT   cetirizine 10 mg Oral Daily   docusate sodium 100 mg Oral BID   gabapentin 300 mg Oral TID   [START ON 9/2/2020] immune globulin (human) 20 g Intravenous Q24H   And      [START ON 9/2/2020] immune globulin (human) 10 g Intravenous Q24H   immune globulin (human) 20 g Intravenous Once   And      immune globulin (human) 20 g Intravenous Once   magnesium hydroxide 10 mL Oral Daily   pantoprazole 40 mg Oral Q AM   Pharmacy to enter IVIG order 400 mg/kg Intravenous Daily   polyethylene glycol 17 g Oral Daily   temazepam 15 mg Oral Once   tiotropium bromide monohydrate 2 puff Inhalation Daily - RT     Continuous Infusions:    hold 1 each     PRN Meds:.•  aluminum-magnesium hydroxide-simethicone  •  bisacodyl  •  bisacodyl  •  diphenhydrAMINE  •  famotidine  •  hold  •  HYDROcodone-acetaminophen  •  hydrocortisone sodium succinate  •  metoclopramide  •  Morphine  •  ondansetron **OR** ondansetron  •  potassium chloride  •  potassium chloride  •  sodium chloride  •  temazepam    Problem List:     Active Hospital Problems    Diagnosis  POA   • **Paresthesia [R20.2]  Yes   • Dysphagia [R13.10]  Unknown   • Environmental allergies [Z91.09]  Unknown   • Uterine fibroid [D25.9]  Unknown   • Abnormal CT of the abdomen [R93.5]  Unknown   • Constipation [K59.00]  Unknown   • Abdominal pain [R10.9]  Unknown   • Paresthesia and pain of both upper extremities [R20.2, M79.601, M79.602]  Yes   • 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 Side effects with high dose IVIG- will defer any dose adjustment to neurology  -Monitor LP cultures and send outs   -PT/OT/ ST consult  -Neurontin per neurology  -Avoid narcotics.  -CMV IgG and IgM elevated. Will ask ID to weigh in on possible need for antivirals  -check EKG given recent cardiac symptoms as could have possible pericarditis- if abnormal may consider echocardiogram.    Asthma/ environmental allergies  -Follows with allergist and reports recently was out of allergens serum and paresthesia symptoms started back when resumed allergy serum.  -Resume home Zyrtec  -Continue bronchodilator Symbicort (replace home Breo, Spiriva and as needed albuterol  -montelukast on hold given possible guillian barre connection      Uterine fibroid  -noted on CT abdomen. Recent MRI lumbar spine noted one up to 2.5 cm suggestive of possible leiomyoma. No significant bleeding.  - Recommend outpatient follow with uterine ultrasound with OBGYN.       Abnormal CT of the abdomen/ abdominal pain/constipation  -appendolith without appendicitis noted. Moderate Stool noted on recent KUB. Still no BM.   - appreciate general surgery input. No surgical intervention required at this time and no leukocytosis or fever.   -diet as tolerated.   -continue bowel regimen and encouraged to try bisacodyl  -continue protonix and aspiration precautions.   -? Trial of reglan ordered as PRN per neurology    Dysphagia  -appreciate ST input- no sign of aspiration.   -encouraged small meals with wash after eating. If symptoms worsen will consider GI consult as she previously had esophageal dilation.    VTE prophylaxis: SCDs  CODE status: full code  Disposition: TBD    I discussed the patients findings and my recommendations with patient and family.    I wore full protective  equipment throughout this patient encounter including a face mask and eye shield. Hand hygiene was performed upon entering patients room and when leaving the room.      CHANDRAKANT Manuel  09/01/20

## 2020-09-01 NOTE — PLAN OF CARE
"  Problem: Patient Care Overview  Goal: Plan of Care Review  Outcome: Ongoing (interventions implemented as appropriate)  Flowsheets (Taken 9/1/2020 1035)  Plan of Care Reviewed With: patient  Outcome Summary: Clinical swallow eval completed. Recommend regular textures and thin liquids, meds whole with thin liquids. Recommend upright, slow rate, and alternate liquids/solids. Patient reports food \"sticking\" at level of sternum, consider GI consult versus GI follow up as outpatient. ST to s/o at this time, please re-consult as appropriate.    Patient was not wearing a face mask during this therapy encounter. Therapist used appropriate personal protective equipment including mask, eye protection and gloves.  Mask used was standard procedure mask. Appropriate PPE was worn during the entire therapy session. Hand hygiene was completed before and after therapy session. Patient is not in enhanced droplet precautions.        "

## 2020-09-01 NOTE — PROGRESS NOTES
"GENERAL SURGERY PROGRESS NOTE    SUMMARY (A/P):  Mrs. Vianney Villarreal is a 45 y.o. year old lady with GBS and intermittent abdominal pain with incidental appendicolith and stool burden on recent CT scan. Afebrile, no localizing abdominal pain; no current plans for surgical intervention for appendicolith. Will increase bowel regimen as she has still not had a BM.      Chief Complaint: GBS    Interval Events: No acute events overnight. One episode of abdominal pain this morning that is now resolved. Tolerated her diet last night and this morning. Afebrile. Sitting up in the chair.    Review of Systems: One episode of abdominal pain, no BM, no N/V.     O:/76 (BP Location: Right arm, Patient Position: Sitting)   Pulse 94   Temp 97.6 °F (36.4 °C) (Oral)   Resp 18   Ht 167.6 cm (65.98\")   Wt 110 kg (242 lb)   LMP  (LMP Unknown)   SpO2 98%   Breastfeeding No   BMI 39.08 kg/m²     GENERAL: alert, well appearing, and in no distress, sitting up in the chair  HEENT: normocephalic, atraumatic, moist mucous membranes, clear sclerae   CHEST: no increased work of breathing, no wheezes, symmetric air entry  CARDIAC: regular rate and rhythm    ABDOMEN: soft, nondistended, nontender to palpation  EXTREMITIES: no cyanosis, clubbing, or edema   SKIN: Warm and moist, no rashes    LABS  Results from last 7 days   Lab Units 08/31/20  1816 08/31/20  0707 08/30/20  0611   WBC 10*3/mm3 4.32 4.09 4.80   HEMOGLOBIN g/dL 12.9 12.7 12.9   HEMATOCRIT % 37.0 37.5 38.1   PLATELETS 10*3/mm3 287 295 290     Results from last 7 days   Lab Units 08/31/20  0707 08/30/20  0611 08/29/20 2007 08/29/20  0541   SODIUM mmol/L 137 137  --  139   POTASSIUM mmol/L 4.2 4.1 3.7 3.5   CHLORIDE mmol/L 104 107  --  107   CO2 mmol/L 22.3 22.6  --  21.4*   BUN mg/dL 9 8  --  7   CREATININE mg/dL 0.81 0.65  --  0.66   CALCIUM mg/dL 9.5 9.2  --  9.1   BILIRUBIN mg/dL  --   --   --  0.8   ALK PHOS U/L  --   --   --  53   ALT (SGPT) U/L  --   --   --  28 "   AST (SGOT) U/L  --   --   --  28   GLUCOSE mg/dL 101* 99  --  99           CHELSEA SALMON MD  General and Endoscopic Surgery  [facility]    [address], [address]  [place], [place], [address]  P: [phone]  F: [phone]

## 2020-09-01 NOTE — NURSING NOTE
"Pt c/o increased numbness and tingling in her extremities, tiredness, and dizziness after approximately 45 minutes of IVIG infusing. VSS. I stayed at pt's bedside to help her relax and I decreased the rate of the IVIG back to the initial rate. After approximately 15 minutes, pt asked me to decrease the rate again so I did. After we got half way through the infusion, pt said, \"I just don't feel right\" and said she didn't want anymore of the infusion. I checked her vs again and they were wnl. Lungs clear throughout. NSR on the monitor. I stopped the infusion and notified RUBEN STALLINGS. No orders received. She said to just make neuro aware in the morning.   "

## 2020-09-02 ENCOUNTER — HOSPITAL ENCOUNTER (OUTPATIENT)
Dept: MRI IMAGING | Facility: HOSPITAL | Age: 45
End: 2020-09-02

## 2020-09-02 LAB
ALB CSF/SERPL: 10 {RATIO} (ref 0–8)
ALBUMIN CSF-MCNC: 49 MG/DL (ref 11–48)
ALBUMIN SERPL-MCNC: 3.7 G/DL (ref 3.5–5.2)
ALBUMIN SERPL-MCNC: 4.7 G/DL (ref 3.8–4.8)
ALBUMIN/GLOB SERPL: 0.9 G/DL
ALP SERPL-CCNC: 50 U/L (ref 39–117)
ALT SERPL W P-5'-P-CCNC: 21 U/L (ref 1–33)
ANION GAP SERPL CALCULATED.3IONS-SCNC: 6.9 MMOL/L (ref 5–15)
AST SERPL-CCNC: 17 U/L (ref 1–32)
BILIRUB SERPL-MCNC: 0.9 MG/DL (ref 0–1.2)
BUN SERPL-MCNC: 9 MG/DL (ref 6–20)
BUN/CREAT SERPL: 13.6 (ref 7–25)
CALCIUM SPEC-SCNC: 8.8 MG/DL (ref 8.6–10.5)
CHLORIDE SERPL-SCNC: 106 MMOL/L (ref 98–107)
CO2 SERPL-SCNC: 24.1 MMOL/L (ref 22–29)
CREAT SERPL-MCNC: 0.66 MG/DL (ref 0.57–1)
DEPRECATED RDW RBC AUTO: 38.3 FL (ref 37–54)
ERYTHROCYTE [DISTWIDTH] IN BLOOD BY AUTOMATED COUNT: 11.8 % (ref 12.3–15.4)
GFR SERPL CREATININE-BSD FRML MDRD: 118 ML/MIN/1.73
GLOBULIN UR ELPH-MCNC: 4.1 GM/DL
GLUCOSE SERPL-MCNC: 108 MG/DL (ref 65–99)
HCT VFR BLD AUTO: 34.5 % (ref 34–46.6)
HGB BLD-MCNC: 12.1 G/DL (ref 12–15.9)
HIV1+2 AB SER QL: NORMAL
IGG CSF-MCNC: 8.1 MG/DL (ref 0–8.6)
IGG SERPL-MCNC: 1291 MG/DL (ref 586–1602)
IGG SYNTH RATE SER+CSF CALC-MRATE: 6.1 MG/DAY
IGG/ALB CLEAR SER+CSF-RTO: 0.6 (ref 0–0.7)
IGG/ALB CSF: 0.17 {RATIO} (ref 0–0.25)
MBP CSF-MCNC: 2.4 NG/ML (ref 0–3.7)
MCH RBC QN AUTO: 30.8 PG (ref 26.6–33)
MCHC RBC AUTO-ENTMCNC: 35.1 G/DL (ref 31.5–35.7)
MCV RBC AUTO: 87.8 FL (ref 79–97)
OLIGOCLONAL BANDS.IT SER+CSF QL: ABNORMAL
PLATELET # BLD AUTO: 266 10*3/MM3 (ref 140–450)
PMV BLD AUTO: 10 FL (ref 6–12)
POTASSIUM SERPL-SCNC: 3.6 MMOL/L (ref 3.5–5.2)
PROT SERPL-MCNC: 7.8 G/DL (ref 6–8.5)
RBC # BLD AUTO: 3.93 10*6/MM3 (ref 3.77–5.28)
SODIUM SERPL-SCNC: 137 MMOL/L (ref 136–145)
WBC # BLD AUTO: 3.39 10*3/MM3 (ref 3.4–10.8)
WNV IGG SPEC QL IA: POSITIVE
WNV IGM CSF QL IA: NEGATIVE

## 2020-09-02 PROCEDURE — 99233 SBSQ HOSP IP/OBS HIGH 50: CPT | Performed by: NURSE PRACTITIONER

## 2020-09-02 PROCEDURE — 87799 DETECT AGENT NOS DNA QUANT: CPT | Performed by: INTERNAL MEDICINE

## 2020-09-02 PROCEDURE — 99232 SBSQ HOSP IP/OBS MODERATE 35: CPT | Performed by: STUDENT IN AN ORGANIZED HEALTH CARE EDUCATION/TRAINING PROGRAM

## 2020-09-02 PROCEDURE — 99232 SBSQ HOSP IP/OBS MODERATE 35: CPT | Performed by: INTERNAL MEDICINE

## 2020-09-02 PROCEDURE — 85027 COMPLETE CBC AUTOMATED: CPT | Performed by: INTERNAL MEDICINE

## 2020-09-02 PROCEDURE — 25010000002 IMMUNE GLOBULIN (HUMAN) 20 GM/200ML SOLUTION: Performed by: PSYCHIATRY & NEUROLOGY

## 2020-09-02 PROCEDURE — G0432 EIA HIV-1/HIV-2 SCREEN: HCPCS | Performed by: INTERNAL MEDICINE

## 2020-09-02 PROCEDURE — 94799 UNLISTED PULMONARY SVC/PX: CPT

## 2020-09-02 PROCEDURE — 80053 COMPREHEN METABOLIC PANEL: CPT | Performed by: INTERNAL MEDICINE

## 2020-09-02 RX ORDER — ACETAMINOPHEN 325 MG/1
650 TABLET ORAL EVERY 6 HOURS PRN
Status: DISCONTINUED | OUTPATIENT
Start: 2020-09-02 | End: 2020-09-05 | Stop reason: HOSPADM

## 2020-09-02 RX ADMIN — CETIRIZINE HYDROCHLORIDE 10 MG: 10 TABLET ORAL at 08:54

## 2020-09-02 RX ADMIN — GABAPENTIN 300 MG: 300 CAPSULE ORAL at 18:39

## 2020-09-02 RX ADMIN — IMMUNE GLOBULIN (HUMAN) 20 G: 10 INJECTION INTRAVENOUS; SUBCUTANEOUS at 22:28

## 2020-09-02 RX ADMIN — IMMUNE GLOBULIN (HUMAN) 20 G: 10 INJECTION INTRAVENOUS; SUBCUTANEOUS at 01:31

## 2020-09-02 RX ADMIN — MAGNESIUM HYDROXIDE 10 ML: 2400 SUSPENSION ORAL at 08:54

## 2020-09-02 RX ADMIN — BUDESONIDE AND FORMOTEROL FUMARATE DIHYDRATE 2 PUFF: 80; 4.5 AEROSOL RESPIRATORY (INHALATION) at 08:43

## 2020-09-02 RX ADMIN — TIOTROPIUM BROMIDE INHALATION SPRAY 2 PUFF: 3.12 SPRAY, METERED RESPIRATORY (INHALATION) at 08:43

## 2020-09-02 RX ADMIN — GABAPENTIN 300 MG: 300 CAPSULE ORAL at 08:54

## 2020-09-02 RX ADMIN — POLYETHYLENE GLYCOL 3350 17 G: 17 POWDER, FOR SOLUTION ORAL at 08:54

## 2020-09-02 RX ADMIN — ACETAMINOPHEN 650 MG: 325 TABLET, FILM COATED ORAL at 22:50

## 2020-09-02 RX ADMIN — DOCUSATE SODIUM 100 MG: 100 CAPSULE, LIQUID FILLED ORAL at 08:54

## 2020-09-02 RX ADMIN — PANTOPRAZOLE SODIUM 40 MG: 40 TABLET, DELAYED RELEASE ORAL at 06:31

## 2020-09-02 RX ADMIN — BUDESONIDE AND FORMOTEROL FUMARATE DIHYDRATE 2 PUFF: 80; 4.5 AEROSOL RESPIRATORY (INHALATION) at 21:30

## 2020-09-02 NOTE — PROGRESS NOTES
Progress Note    Name: Vianney Villarreal ADMIT: 2020   : 1975  PCP: Gurwinder Almazan MD    MRN: [id number] LOS: 4 days   AGE/SEX: 45 y.o. female  ROOM: P598/1   Date of Encounter Visit: 20    Subjective:     Interval History: tolerated dose of IVIG last night. Per nursing staff would have some episodes where she would wake herself up from stopping breathing at times, but otherwise not issues. Ambulating in room and reports less heaviness in legs.     REVIEW OF SYSTEMS:   no fever or chills.  No chest pain, palpitations or edema.   No shortness of breath. Occasional nonproductive cough.   No vomiting or diarrhea. Nausea. Abdominal cramps yesterday were less intense and seem to be associated after eating.   Denies any difficulty swallowing yesterday. good Bm last  Night.      Objective:   Temp:  [97.5 °F (36.4 °C)-98.5 °F (36.9 °C)] 98.1 °F (36.7 °C)  Heart Rate:  [] 93  Resp:  [16-20] 16  BP: ()/(63-86) 96/81   SpO2:  [96 %-100 %] 98 %  on    Device (Oxygen Therapy): room air    Intake/Output Summary (Last 24 hours) at 2020 0918  Last data filed at 2020 0131  Gross per 24 hour   Intake 400 ml   Output --   Net 400 ml     Body mass index is 39.08 kg/m².      20  0100   Weight: 110 kg (242 lb)     Weight change:     Physical Exam   Constitutional: No distress.   HENT:   Head: Atraumatic.   Nose: Nose normal.   Eyes: Conjunctivae are normal.   Cardiovascular: Normal rate and regular rhythm.   No murmur heard.  Pulmonary/Chest: Effort normal. She has no wheezes. She has no rales.   Diminished bases   Abdominal: Soft. Bowel sounds are normal. She exhibits distension (mild but soft). There is tenderness (generalized upper quadrants, but improved).   Musculoskeletal: She exhibits no edema.   Neurological: She is alert.   Slightly improved bilateral foot reflexes lizbet on the right   Skin: Skin is warm and dry. She is not diaphoretic.       Results Review:      Results from last 7  days   Lab Units 09/02/20  0619 08/31/20  0707 08/30/20  0611 08/29/20 2007 08/29/20  0541   SODIUM mmol/L 137 137 137  --  139   POTASSIUM mmol/L 3.6 4.2 4.1 3.7 3.5   CHLORIDE mmol/L 106 104 107  --  107   CO2 mmol/L 24.1 22.3 22.6  --  21.4*   BUN mg/dL 9 9 8  --  7   CREATININE mg/dL 0.66 0.81 0.65  --  0.66   GLUCOSE mg/dL 108* 101* 99  --  99   CALCIUM mg/dL 8.8 9.5 9.2  --  9.1   AST (SGOT) U/L 17  --   --   --  28   ALT (SGPT) U/L 21  --   --   --  28     Estimated Creatinine Clearance: 138.1 mL/min (by C-G formula based on SCr of 0.66 mg/dL).      Results from last 7 days   Lab Units 08/29/20  1857   GLUCOSE mg/dL 94     Results from last 7 days   Lab Units 08/29/20 2007   TROPONIN T ng/mL <0.010         Results from last 7 days   Lab Units 08/29/20  0541   TSH uIU/mL 0.984     Results from last 7 days   Lab Units 08/30/20 0611   MAGNESIUM mg/dL 2.2           Invalid input(s): LDLCALC  Results from last 7 days   Lab Units 09/02/20  0619 08/31/20  1816 08/31/20  0707 08/30/20 0611 08/29/20  0541   WBC 10*3/mm3 3.39* 4.32 4.09 4.80  --  4.11   HEMOGLOBIN g/dL 12.1 12.9 12.7 12.9  --  12.8   HEMATOCRIT % 34.5 37.0 37.5 38.1  --  38.4   PLATELETS 10*3/mm3 266 287 295 290  --  303   MCV fL 87.8 88.7 90.4 91.4  --  90.8   MCH pg 30.8 30.9 30.6 30.9  --  30.3   MCHC g/dL 35.1 34.9 33.9 33.9  --  33.3   RDW % 11.8* 12.4 12.3 12.3  --  12.2*   RDW-SD fl 38.3 40.2 40.2 41.3  --  40.5   MPV fL 10.0 9.7 10.2 9.9  --  10.0   NEUTROPHIL % %  --  38.0* 42.8 42.0*   < >  --    LYMPHOCYTE % %  --  47.7* 40.8 42.3   < >  --    MONOCYTES % %  --  9.5 10.5 11.0   < >  --    EOSINOPHIL % %  --  3.7 4.2 3.5   < >  --    BASOPHIL % %  --  0.9 1.5 1.0   < >  --    IMM GRAN % %  --  0.2 0.2 0.2   < >  --    NEUTROS ABS 10*3/mm3  --  1.64* 1.75 2.01   < >  --    LYMPHS ABS 10*3/mm3  --  2.06 1.67 2.03   < >  --    MONOS ABS 10*3/mm3  --  0.41 0.43 0.53   < >  --    EOS ABS 10*3/mm3  --  0.16 0.17 0.17   < >  --    BASOS ABS  10*3/mm3  --  0.04 0.06 0.05   < >  --    IMMATURE GRANS (ABS) 10*3/mm3  --  0.01 0.01 0.01   < >  --    NRBC /100 WBC  --  0.0 0.0 0.0   < >  --     < > = values in this interval not displayed.                                 Results from last 7 days   Lab Units 08/30/20  0909   NITRITE UA  Negative           Imaging:  Imaging Results (Last 24 Hours)     ** No results found for the last 24 hours. **          I reviewed the patient's new clinical results and medications.         Medication Review:   Scheduled Meds:    budesonide-formoterol 2 puff Inhalation BID - RT   cetirizine 10 mg Oral Daily   docusate sodium 100 mg Oral BID   gabapentin 300 mg Oral TID   immune globulin (human) 20 g Intravenous Q24H   And      immune globulin (human) 10 g Intravenous Q24H   magnesium hydroxide 10 mL Oral Daily   pantoprazole 40 mg Oral Q AM   Pharmacy to enter IVIG order 400 mg/kg Intravenous Daily   polyethylene glycol 17 g Oral Daily   temazepam 15 mg Oral Once   tiotropium bromide monohydrate 2 puff Inhalation Daily - RT     Continuous Infusions:    hold 1 each     PRN Meds:.•  aluminum-magnesium hydroxide-simethicone  •  bisacodyl  •  bisacodyl  •  diphenhydrAMINE  •  famotidine  •  hold  •  HYDROcodone-acetaminophen  •  hydrocortisone sodium succinate  •  metoclopramide  •  Morphine  •  ondansetron **OR** ondansetron  •  potassium chloride  •  potassium chloride  •  sodium chloride  •  temazepam    Problem List:     Active Hospital Problems    Diagnosis  POA   • **Paresthesia [R20.2]  Yes   • Dysphagia [R13.10]  Unknown   • Tachycardia [R00.0]  Unknown   • Environmental allergies [Z91.09]  Unknown   • Uterine fibroid [D25.9]  Unknown   • Abnormal CT of the abdomen [R93.5]  Unknown   • Constipation [K59.00]  Unknown   • Abdominal pain [R10.9]  Unknown   • Paresthesia and pain of both upper extremities [R20.2, M79.601, M79.602]  Yes   • 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 CMV IgG and IgM elevated.   -Monitor LP cultures, CMV PCR and EBV PCR  -PT/OT/ ST following.   -IVIG and Neurontin per neurology- patient would like to consider weaning neurontin   -Avoid narcotics.  -EKG reviewed and HR improved with possible signs of LVH but no obvious pericarditis  -appreciate ID input.     Asthma/ environmental allergies  -Continue bronchodilator Symbicort (replace home Breo, Spiriva and as needed albuterol  -montelukast on hold given possible guillian barre connection      Uterine fibroid  -noted on CT abdomen. Recent MRI lumbar spine noted one up to 2.5 cm suggestive of possible leiomyoma. No significant bleeding.  - Recommend outpatient follow with uterine ultrasound with OBGYN.       Abnormal CT of the abdomen/ abdominal pain/constipation  -appendolith without appendicitis noted.   - appreciate general surgery input. No surgical intervention required.   -diet as tolerated.   -continue bowel regimen, protonix and  aspiration precautions.   -consider trying reglan again if has stomach discomfort again    Dysphagia  -appreciate ST input- no sign of aspiration.   -encouraged small meals with wash after eating. If symptoms worsen will consider GI consult as she previously had esophageal dilation.    VTE prophylaxis: SCDs  CODE status: full code  Disposition: TBD    I discussed the patients findings and my recommendations with patient and family.    I wore full protective equipment throughout this patient encounter including a face mask and eye  shield. Hand hygiene was performed upon entering patients room and when leaving the room.      Holly Andrea, CHANDRAKANT  09/02/20

## 2020-09-02 NOTE — PROGRESS NOTES
LOS: 4 days     Chief Complaint:  Follow-up GBS    Interval History:  No fever. Tolerated IVIg fairly well. No new symptoms to report. Still has numbness/tingling.     ROS: no CP or rash    Vital Signs  Temp:  [97.5 °F (36.4 °C)-98.5 °F (36.9 °C)] 98.1 °F (36.7 °C)  Heart Rate:  [] 93  Resp:  [16-18] 16  BP: ()/(63-86) 96/81    Physical Exam:  General: awake, alert, NAD, very nice  Eyes:  no scleral icterus  Cardiovascular: NR  Respiratory: normal work of breathing on ambient air  GI: Abdomen is soft  : no Castanon catheter present  Skin: No rashes  Neurological: Alert and oriented x 3, motor strength 5/5 in all four extremities  Psychiatric: Normal mood and affect     Meds:    Current Facility-Administered Medications:   •  aluminum-magnesium hydroxide-simethicone (MAALOX MAX) 400-400-40 MG/5ML suspension 15 mL, 15 mL, Oral, Q6H PRN, Eduardo Treviño APRN  •  bisacodyl (DULCOLAX) EC tablet 5 mg, 5 mg, Oral, Daily PRN, Eduardo Treviño APRN  •  bisacodyl (DULCOLAX) suppository 10 mg, 10 mg, Rectal, Daily PRN, Eduardo Treviño APRN, 10 mg at 08/30/20 2051  •  budesonide-formoterol (SYMBICORT) 80-4.5 MCG/ACT inhaler 2 puff, 2 puff, Inhalation, BID - RT, Nolan Weaver MD, 2 puff at 09/02/20 0843  •  cetirizine (zyrTEC) tablet 10 mg, 10 mg, Oral, Daily, Holly Andrea APRN, 10 mg at 09/02/20 0854  •  diphenhydrAMINE (BENADRYL) injection 50 mg, 50 mg, Intravenous, Once PRN, Naresh Thurston MD  •  docusate sodium (COLACE) capsule 100 mg, 100 mg, Oral, BID, Rose Mary Smith MD, 100 mg at 09/02/20 0854  •  famotidine (PEPCID) injection 20 mg, 20 mg, Intravenous, Once PRN, Naresh Thurston MD  •  gabapentin (NEURONTIN) capsule 300 mg, 300 mg, Oral, TID, Gómez Nichols MD, 300 mg at 09/02/20 0854  •  Hold medication, 1 each, Does not apply, Continuous PRN, Gómez Nichols MD  •  HYDROcodone-acetaminophen (NORCO) 5-325 MG per tablet 1 tablet, 1 tablet, Oral, Q6H PRN, Holly Andrea, APRN  •   hydrocortisone sodium succinate (Solu-CORTEF) injection 100 mg, 100 mg, Intravenous, Once PRN, Naresh Thurston MD  •  immune globulin (human) (GAMUNEX-C) infusion 20 g, 20 g, Intravenous, Q24H **AND** immune globulin (human) (GAMUNEX-C) infusion 10 g, 10 g, Intravenous, Q24H, Naresh Thurston MD  •  magnesium hydroxide (MILK OF MAGNESIA) suspension 2400 mg/10mL 10 mL, 10 mL, Oral, Daily, Rose Mary Smith MD, 10 mL at 09/02/20 0854  •  metoclopramide (REGLAN) tablet 5 mg, 5 mg, Oral, TID PRN, Naresh Thurston MD, 5 mg at 08/31/20 2223  •  morphine injection 4 mg, 4 mg, Intravenous, Q4H PRN, Nolan Weaver MD, 4 mg at 08/30/20 1903  •  ondansetron (ZOFRAN) tablet 4 mg, 4 mg, Oral, Q6H PRN **OR** ondansetron (ZOFRAN) injection 4 mg, 4 mg, Intravenous, Q6H PRN, Eduardo Treviño APRN, 4 mg at 08/30/20 1213  •  pantoprazole (PROTONIX) EC tablet 40 mg, 40 mg, Oral, Q AM, Nolan Weaver MD, 40 mg at 09/02/20 0631  •  Pharmacy to enter IVIG order 45,000 mg, 400 mg/kg, Intravenous, Daily, Naresh Thurston MD  •  polyethylene glycol (MIRALAX) packet 17 g, 17 g, Oral, Daily, Nolan Weaver MD, 17 g at 09/02/20 0854  •  potassium chloride (KLOR-CON) packet 40 mEq, 40 mEq, Oral, PRN, Nolan Weaver MD  •  potassium chloride (MICRO-K) CR capsule 40 mEq, 40 mEq, Oral, PRN, Nolan Weaver MD, 40 mEq at 08/29/20 1633  •  sodium chloride 0.9 % flush 10 mL, 10 mL, Intravenous, PRN, Eduardo Treviño APRN, 10 mL at 08/31/20 0945  •  temazepam (RESTORIL) capsule 15 mg, 15 mg, Oral, Once, Gómez Nichlos MD  •  temazepam (RESTORIL) capsule 15 mg, 15 mg, Oral, Nightly PRN, Gómez Nichols MD  •  tiotropium (SPIRIVA RESPIMAT) 2.5 mcg/act aerosol solution inhaler, 2 puff, Inhalation, Daily - RT, Nolan Weaver MD, 2 puff at 09/02/20 0843    LABS:  CBC, CMP, micro reviewed today  Lab Results   Component Value Date    WBC 3.39 (L) 09/02/2020    HGB 12.1 09/02/2020    HCT 34.5 09/02/2020    MCV 87.8 09/02/2020     09/02/2020      Lab Results   Component Value Date    GLUCOSE 108 (H) 09/02/2020    BUN 9 09/02/2020    CREATININE 0.66 09/02/2020    EGFRIFAFRI 118 09/02/2020    BCR 13.6 09/02/2020    CO2 24.1 09/02/2020    CALCIUM 8.8 09/02/2020    ALBUMIN 3.70 09/02/2020    AST 17 09/02/2020    ALT 21 09/02/2020    CRP 0.38 08/11/2020     CMV PCR pending  EBV PCR pending  HIV Ab pending  CMV IgM and IgG positive  EBV IgM negative and IgG positive  RPR nonreactive     LP results  0 nucleated cells  Pro 71  Glc 72     Microbiology:  8/29 COVID: negative    Assessment/Plan   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 Obesity BMI 39  3. 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 We are awaiting her response to this therapy.     About 10% of cases of GBS are due to CMV though this is typically thought to be an auto-antibody immune mediated response rather than a direct effect of the virus itself. Additonally, I'm not sure it's clear that she has CMV. She's had no prodrome of viral illness, no fever, no abnormal LFTs, and no atypical lymphocytosis. Her IgG is positive suggestive of prior infection. IgM is positive and could be consistent with recent infection but IgM antibodies are notoriously unreliable. I'm going to clear the air and check a CMV PCR (low level positive could reflect reactivation but a very high level could indicated active disease). In general, acute CMV in immunocompetent hosts is not treated with antivirals (ganciclovir or valgancyclovir) as risks often outweigh benefits. An immunocompetent host usually clears this infection on his or her own. F/U EBV PCR and HIV Ab as well.      Thank you for this consult. ID will follow.

## 2020-09-02 NOTE — PROGRESS NOTES
DOS: 2020  NAME: Vianney Villarreal   : 1975  PCP: uGrwinder Almazan MD    CC: Numbness and tingling in arms and legs    Subjective: Pt seen in follow up, however the problem is new to me.  She is feeling a little bit better and has noticed some improvement with her strength in her arms and legs.  Still feeling numbness and tingling especially in her feet otherwise no new symptoms.  No family at bedside.    Objective:  Vital signs:   Vitals:    20 0300 20 0330 20 0843 20 0846   BP: 102/72 96/81     BP Location:       Patient Position:       Pulse: 84 96 93    Resp:   16 16   Temp:  98.1 °F (36.7 °C)     TempSrc:  Oral     SpO2: 96% 97% 98%    Weight:       Height:           Current Facility-Administered Medications:   •  aluminum-magnesium hydroxide-simethicone (MAALOX MAX) 400-400-40 MG/5ML suspension 15 mL, 15 mL, Oral, Q6H PRN, Eduardo Treviño APRN  •  bisacodyl (DULCOLAX) EC tablet 5 mg, 5 mg, Oral, Daily PRN, Eduardo Treviño APRN  •  bisacodyl (DULCOLAX) suppository 10 mg, 10 mg, Rectal, Daily PRN, Eduardo Treviño APRN, 10 mg at 20  •  budesonide-formoterol (SYMBICORT) 80-4.5 MCG/ACT inhaler 2 puff, 2 puff, Inhalation, BID - RT, Nolan Weaver MD, 2 puff at 20 0843  •  cetirizine (zyrTEC) tablet 10 mg, 10 mg, Oral, Daily, Holly Andrea APRN, 10 mg at 20 0854  •  diphenhydrAMINE (BENADRYL) injection 50 mg, 50 mg, Intravenous, Once PRN, Naresh Thurston MD  •  docusate sodium (COLACE) capsule 100 mg, 100 mg, Oral, BID, Rose Mary Smith MD, 100 mg at 20 0854  •  famotidine (PEPCID) injection 20 mg, 20 mg, Intravenous, Once PRN, Naresh Thurston MD  •  gabapentin (NEURONTIN) capsule 300 mg, 300 mg, Oral, TID, Gómez Nichols MD, 300 mg at 20 0854  •  Hold medication, 1 each, Does not apply, Continuous PRN, Gómez Nichols MD  •  HYDROcodone-acetaminophen (NORCO) 5-325 MG per tablet 1 tablet, 1 tablet, Oral, Q6H PRN, Holly Andrea,  APRN  •  hydrocortisone sodium succinate (Solu-CORTEF) injection 100 mg, 100 mg, Intravenous, Once PRN, Naresh Thurston MD  •  immune globulin (human) (GAMUNEX-C) infusion 20 g, 20 g, Intravenous, Q24H **AND** immune globulin (human) (GAMUNEX-C) infusion 10 g, 10 g, Intravenous, Q24H, Naresh Thurston MD  •  magnesium hydroxide (MILK OF MAGNESIA) suspension 2400 mg/10mL 10 mL, 10 mL, Oral, Daily, Rose Mary Smith MD, 10 mL at 09/02/20 0854  •  metoclopramide (REGLAN) tablet 5 mg, 5 mg, Oral, TID PRN, Naresh Thurston MD, 5 mg at 08/31/20 2223  •  morphine injection 4 mg, 4 mg, Intravenous, Q4H PRN, Nolan Weaver MD, 4 mg at 08/30/20 1903  •  ondansetron (ZOFRAN) tablet 4 mg, 4 mg, Oral, Q6H PRN **OR** ondansetron (ZOFRAN) injection 4 mg, 4 mg, Intravenous, Q6H PRN, Eduardo Treviño APRN, 4 mg at 08/30/20 1213  •  pantoprazole (PROTONIX) EC tablet 40 mg, 40 mg, Oral, Q AM, Nolan Weaver MD, 40 mg at 09/02/20 0631  •  Pharmacy to enter IVIG order 45,000 mg, 400 mg/kg, Intravenous, Daily, Naresh Thurston MD  •  polyethylene glycol (MIRALAX) packet 17 g, 17 g, Oral, Daily, Nolan Weaver MD, 17 g at 09/02/20 0854  •  potassium chloride (KLOR-CON) packet 40 mEq, 40 mEq, Oral, PRN, Nolan Weaver MD  •  potassium chloride (MICRO-K) CR capsule 40 mEq, 40 mEq, Oral, PRN, Nolan Weaver MD, 40 mEq at 08/29/20 1633  •  sodium chloride 0.9 % flush 10 mL, 10 mL, Intravenous, PRN, Eduardo Treviño APRN, 10 mL at 08/31/20 0945  •  temazepam (RESTORIL) capsule 15 mg, 15 mg, Oral, Once, Gómez Nichols MD  •  temazepam (RESTORIL) capsule 15 mg, 15 mg, Oral, Nightly PRN, Gómez Nichols MD  •  tiotropium (SPIRIVA RESPIMAT) 2.5 mcg/act aerosol solution inhaler, 2 puff, Inhalation, Daily - RT, Nolan Weaver MD, 2 puff at 09/02/20 0843    PRN meds  •  aluminum-magnesium hydroxide-simethicone  •  bisacodyl  •  bisacodyl  •  diphenhydrAMINE  •  famotidine  •  hold  •  HYDROcodone-acetaminophen  •  hydrocortisone sodium  succinate  •  metoclopramide  •  Morphine  •  ondansetron **OR** ondansetron  •  potassium chloride  •  potassium chloride  •  sodium chloride  •  temazepam    No current facility-administered medications on file prior to encounter.      Current Outpatient Medications on File Prior to Encounter   Medication Sig   • BREO ELLIPTA 200-25 MCG/INH inhaler Inhale 1 puff Every Morning.   • Calcium Carbonate Antacid (SLADE-SELTZER ANTACID PO)    • cetirizine (zyrTEC) 5 MG tablet Take 5 mg by mouth Every Night.   • EPIPEN 2-ANYI 0.3 MG/0.3ML solution auto-injector injection Inject 0.3 mL into the appropriate muscle as directed by prescriber Take As Directed. TAKES ALLERGY SHOTS   • gabapentin (NEURONTIN) 300 MG capsule 1 p.o. nightly   • montelukast (SINGULAIR) 10 MG tablet Take 10 mg by mouth Every Night.   • pantoprazole (PROTONIX) 40 MG EC tablet TAKE 1 TABLET DAILY (NEED APPOINTMENT IN JULY)   • SPIRIVA RESPIMAT 1.25 MCG/ACT aerosol solution Inhale 2 puffs Every Morning.   • VENTOLIN  (90 Base) MCG/ACT inhaler INHALE 2 PUFFS BY MOUTH EVERY FOUR HOURS AS NEEDED FOR WHEEZING       General appearance: Obese female, NAD, alert and cooperative, well groomed  HEENT: Normocephalic, atraumatic, PERRL, no masses or tenderness  COR: RRR  Resp: Even and unlabored  Extremities: No obvious edema.  Skin: warm, dry    Neurological:   MS: oriented x3, recent/remote memory intact, normal attention/concentration, language intact, no neglect, normal fund of knowledge  CN: visual acuity grossly normal, visual fields full, PERRL, EOMI, facial sensation equal, no facial droop, hearing symmetric, palate elevates symmetrically, shoulder shrug equal, tongue midline  Motor: 4/5 in BUE, 4/5 in BLE, mild drift bilaterally in BLE  Reflexes: Areflexic in lower extremities  Sensory: light touch sensation intact in all 4 ext.  Coordination: Normal finger to nose test  Gait and station: [facility] 2-3  person assist   Rapid alternating movements: normal  finger to thumb tap    Laboratory results:  Lab Results   Component Value Date    TSH 0.984 08/29/2020     Lab Results   Component Value Date    [id number] 842 08/29/2020     Lab Results   Component Value Date    HGBA1C 5.20 08/11/2020     Lab Results   Component Value Date    GLUCOSE 108 (H) 09/02/2020    BUN 9 09/02/2020    CREATININE 0.66 09/02/2020    EGFRIFAFRI 118 09/02/2020    BCR 13.6 09/02/2020    K 3.6 09/02/2020    CO2 24.1 09/02/2020    CALCIUM 8.8 09/02/2020    ALBUMIN 3.70 09/02/2020    AST 17 09/02/2020    ALT 21 09/02/2020     Lab Results   Component Value Date    WBC 3.39 (L) 09/02/2020    HGB 12.1 09/02/2020    HCT 34.5 09/02/2020    MCV 87.8 09/02/2020     09/02/2020     Brief Urine Lab Results  (Last result in the past 365 days)      Color   Clarity   Blood   Leuk Est   Nitrite   Protein   CREAT   Urine HCG        08/30/20 0909 Yellow Clear Negative Negative Negative Negative             No results found for: ACANTHNAEG, AFBCX, BPERTUSSISCX, BLOODCX  No results found for: BCIDPCR, CXREFLEX, CSFCX, CULTURETIS  No results found for: CULTURES, HSVCX, URCX  No results found for: EYECULTURE, GCCX, LABHSV  No results found for: LEGIONELLA, MRSACX, MUMPSCX, MYCOPLASCX  No results found for: NOCARDIACX, STOOLCX  No results found for: THROATCX, UNSTIMCULT, URINECX, CULTURE, VZVCULTUR  No results found for: VIRALCULTU, WOUNDCX  Pain Management Panel     There is no flowsheet data to display.          Review and interpretation of imaging:  Mri Brain Without Contrast    Result Date: 8/27/2020  No evidence of acute infarction. Areas of T2 hyperintensity involving the frontal sinus of midline which may represent a mucus retention cyst or mucocele. There is no evidence of an air-fluid level within the paranasal sinuses to suggest acute sinusitis. A small mucus retention cyst involving the right maxillary sinus inferiorly is also present.  MRI EXAMINATION OF THE LUMBAR SPINE WITHOUT CONTRAST:  There is  mild dextroscoliosis with the apex level of L4. The conus is at L1-L2 which is slightly low lying. There is a grade 1 retrolisthesis of L5 upon S1 estimated to be approximately 2 mm.  L1-L2: There is no evidence of disc bulge or herniation.  L2-L3: Mild facet degenerative disease is present bilaterally.  L3-L4: Mild to moderate facet degenerative disease is present bilaterally. There is no evidence of disc bulge or herniation.  L4-L5: Mild-to-moderate facet degenerative disease is present bilaterally. There is no evidence of disc bulge or herniation.  L5-S1: Mild-to-moderate facet degenerative disease is present bilaterally. There is no evidence of disc bulge or herniation.  The uterus is only partially visualized but there is likely a leiomyoma involving the uterus measuring approximately 2.5 cm in size involving the lower uterine segment. A pelvic ultrasound is suggested.  IMPRESSION: 1. Mild degenerative disease involving the lumbar spine as described with no evidence of disc herniation. The conus is slightly low lying. 2. Partial visualization of an area of signal loss involving the lower uterine segment on the sagittal T2 sequence measuring approximately 2.5 cm in size. This likely represents a leiomyoma. Further evaluation with a pelvic ultrasound is recommended.  The above information was called to and discussed with Dr. Mosqueda who is covering for Dr. Almazan on 08/26/2020 at 1703 hours.  This report was finalized on 8/27/2020 11:37 AM by Dr. Harlan Epperson M.D.      Mri Lumbar Spine Without Contrast    Result Date: 8/27/2020  No evidence of acute infarction. Areas of T2 hyperintensity involving the frontal sinus of midline which may represent a mucus retention cyst or mucocele. There is no evidence of an air-fluid level within the paranasal sinuses to suggest acute sinusitis. A small mucus retention cyst involving the right maxillary sinus inferiorly is also present.  MRI EXAMINATION OF THE LUMBAR SPINE  WITHOUT CONTRAST:  There is mild dextroscoliosis with the apex level of L4. The conus is at L1-L2 which is slightly low lying. There is a grade 1 retrolisthesis of L5 upon S1 estimated to be approximately 2 mm.  L1-L2: There is no evidence of disc bulge or herniation.  L2-L3: Mild facet degenerative disease is present bilaterally.  L3-L4: Mild to moderate facet degenerative disease is present bilaterally. There is no evidence of disc bulge or herniation.  L4-L5: Mild-to-moderate facet degenerative disease is present bilaterally. There is no evidence of disc bulge or herniation.  L5-S1: Mild-to-moderate facet degenerative disease is present bilaterally. There is no evidence of disc bulge or herniation.  The uterus is only partially visualized but there is likely a leiomyoma involving the uterus measuring approximately 2.5 cm in size involving the lower uterine segment. A pelvic ultrasound is suggested.  IMPRESSION: 1. Mild degenerative disease involving the lumbar spine as described with no evidence of disc herniation. The conus is slightly low lying. 2. Partial visualization of an area of signal loss involving the lower uterine segment on the sagittal T2 sequence measuring approximately 2.5 cm in size. This likely represents a leiomyoma. Further evaluation with a pelvic ultrasound is recommended.  The above information was called to and discussed with Dr. Mosqueda who is covering for Dr. Almazan on 08/26/2020 at 1703 hours.  This report was finalized on 8/27/2020 11:37 AM by Dr. Harlan Epperson M.D.      Ir Lumbar Puncture Diag/thera    Result Date: 8/31/2020  Successful lumbar puncture.  This report was finalized on 8/31/2020 3:21 PM by Dr. Sander Ortiz M.D.      Xr Abdomen Kub    Result Date: 8/29/2020  Mild to moderate stool throughout the colon. No evidence to suggest bowel obstruction.  This report was finalized on 8/29/2020 7:52 PM by Dr. Edilberto Bergeron M.D.          Impression/Assessment:  This is a  "45-year-old female with past medical history of asthma and GERD who presented to the hospital on 8/29/2020 with complaints of bilateral upper extremity and lower extremity numbness and tingling.  Patient reported that her paresthesias started around 3 to 4 weeks ago that was accompanied by stomach spasms that would radiate to her back and feelings of body \"heaviness\" while ambulating.  The numbness first started in her feet and then spread to her legs and eventually started in her hands and moved up her arms.  She also reported some numbness in her.  Area when she would wipe with toilet paper but denied any bowel or bladder incontinence.  She originally presented to [facility] on 8/26 where she underwent a MRI brain  WO that was reportedly normal.  She was discharged home however she returned the next day with similar complaints and in addition shortness of breath.  She was transferred here for further work-up and evaluation.    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  8/26 MRI L-spine WO: Mild degenerative disease, area of signal loss involving the lower uterine segment on the sagittal T2 sequence radiology recommended a pelvic ultrasound.    Plan:  She has received 2 out of 5 IVIG doses with plans for third dose today.  I " reviewed ID's note, patient had positive CMV antibodies; CMV and EBV PCRs have been ordered along with HIV antibodies.  HIV antibodies did come back negative.  EBV VCA IgG was positive.  Will await PCR results.  Kidney and liver enzymes normal.  PT/OT  Therapies as written. Call RRT for any acute neurological changes. We will continue to follow and advise.    Case discussed with patient and Dr. Thurston, and he agrees with plan above.   CHANDRAKANT Rubio

## 2020-09-02 NOTE — PROGRESS NOTES
"GENERAL SURGERY PROGRESS NOTE    SUMMARY (A/P):  Mrs. Vianney Villarreal is a 45 y.o. year old lady with GBS and intermittent abdominal pain with incidental appendicolith and stool burden on recent CT scan.  Nominal pain improving.  Had a bowel movement yesterday.  Will titrate bowel regimen to normal bowel habits.    Chief Complaint: GBS    Interval Events: No acute events overnight.  States her abdominal pain is been better with a heating pad.  She had one bowel movement yesterday.  She been tolerating diet.  Received first treatment with IVIG last night.  She states her hands and feet feel a bit better.    Review of Systems: None abdominal pain, no BM, no N/V.     O:/79 (BP Location: Right arm, Patient Position: Sitting)   Pulse 93   Temp 98.2 °F (36.8 °C) (Oral)   Resp 16   Ht 167.6 cm (65.98\")   Wt 110 kg (242 lb)   LMP  (LMP Unknown)   SpO2 98%   Breastfeeding No   BMI 39.08 kg/m²     GENERAL: alert, well appearing, and in no distress, sitting up in the chair  HEENT: normocephalic, atraumatic, moist mucous membranes, clear sclerae   CHEST: no increased work of breathing, no wheezes, symmetric air entry  CARDIAC: regular rate and rhythm    ABDOMEN: soft, nondistended, nontender to palpation  EXTREMITIES: no cyanosis, clubbing, or edema   SKIN: Warm and moist, no rashes    LABS  Results from last 7 days   Lab Units 09/02/20  0619 08/31/20  1816 08/31/20  0707   WBC 10*3/mm3 3.39* 4.32 4.09   HEMOGLOBIN g/dL 12.1 12.9 12.7   HEMATOCRIT % 34.5 37.0 37.5   PLATELETS 10*3/mm3 266 287 295     Results from last 7 days   Lab Units 09/02/20  0619 08/31/20  0707 08/30/20  0611  08/29/20  0541   SODIUM mmol/L 137 137 137  --  139   POTASSIUM mmol/L 3.6 4.2 4.1   < > 3.5   CHLORIDE mmol/L 106 104 107  --  107   CO2 mmol/L 24.1 22.3 22.6  --  21.4*   BUN mg/dL 9 9 8  --  7   CREATININE mg/dL 0.66 0.81 0.65  --  0.66   CALCIUM mg/dL 8.8 9.5 9.2  --  9.1   BILIRUBIN mg/dL 0.9  --   --   --  0.8   ALK PHOS U/L 50  " --   --   --  53   ALT (SGPT) U/L 21  --   --   --  28   AST (SGOT) U/L 17  --   --   --  28   GLUCOSE mg/dL 108* 101* 99  --  99    < > = values in this interval not displayed.           CHELSEA SALMON MD  General and Endoscopic Surgery  [facility] ([facility]    [address], [address]  [place], [place], [address]  P: [phone]  F: [phone]

## 2020-09-03 LAB
CMV DNA SERPL NAA+PROBE-ACNC: NORMAL IU/ML
LOG 10 CMV QN DNA PI: NORMAL

## 2020-09-03 PROCEDURE — 94799 UNLISTED PULMONARY SVC/PX: CPT

## 2020-09-03 PROCEDURE — 99232 SBSQ HOSP IP/OBS MODERATE 35: CPT | Performed by: STUDENT IN AN ORGANIZED HEALTH CARE EDUCATION/TRAINING PROGRAM

## 2020-09-03 PROCEDURE — 25010000002 IMMUNE GLOBULIN (HUMAN) 10 GM/100ML SOLUTION: Performed by: PSYCHIATRY & NEUROLOGY

## 2020-09-03 PROCEDURE — 99232 SBSQ HOSP IP/OBS MODERATE 35: CPT | Performed by: NURSE PRACTITIONER

## 2020-09-03 RX ADMIN — TIOTROPIUM BROMIDE INHALATION SPRAY 2 PUFF: 3.12 SPRAY, METERED RESPIRATORY (INHALATION) at 07:37

## 2020-09-03 RX ADMIN — METOCLOPRAMIDE 5 MG: 5 TABLET ORAL at 11:03

## 2020-09-03 RX ADMIN — GABAPENTIN 300 MG: 300 CAPSULE ORAL at 21:27

## 2020-09-03 RX ADMIN — ACETAMINOPHEN 650 MG: 325 TABLET, FILM COATED ORAL at 06:01

## 2020-09-03 RX ADMIN — CETIRIZINE HYDROCHLORIDE 10 MG: 10 TABLET ORAL at 08:28

## 2020-09-03 RX ADMIN — BUDESONIDE AND FORMOTEROL FUMARATE DIHYDRATE 2 PUFF: 80; 4.5 AEROSOL RESPIRATORY (INHALATION) at 07:38

## 2020-09-03 RX ADMIN — DOCUSATE SODIUM 100 MG: 100 CAPSULE, LIQUID FILLED ORAL at 08:28

## 2020-09-03 RX ADMIN — IMMUNE GLOBULIN (HUMAN) 10 G: 10 INJECTION INTRAVENOUS; SUBCUTANEOUS at 23:45

## 2020-09-03 RX ADMIN — GABAPENTIN 300 MG: 300 CAPSULE ORAL at 08:28

## 2020-09-03 RX ADMIN — IMMUNE GLOBULIN (HUMAN) 10 G: 10 INJECTION INTRAVENOUS; SUBCUTANEOUS at 02:09

## 2020-09-03 RX ADMIN — BUDESONIDE AND FORMOTEROL FUMARATE DIHYDRATE 2 PUFF: 80; 4.5 AEROSOL RESPIRATORY (INHALATION) at 20:54

## 2020-09-03 RX ADMIN — PANTOPRAZOLE SODIUM 40 MG: 40 TABLET, DELAYED RELEASE ORAL at 06:01

## 2020-09-03 RX ADMIN — GABAPENTIN 300 MG: 300 CAPSULE ORAL at 17:32

## 2020-09-03 RX ADMIN — METOCLOPRAMIDE 5 MG: 5 TABLET ORAL at 21:28

## 2020-09-03 RX ADMIN — GABAPENTIN 300 MG: 300 CAPSULE ORAL at 01:11

## 2020-09-03 RX ADMIN — POLYETHYLENE GLYCOL 3350 17 G: 17 POWDER, FOR SOLUTION ORAL at 08:28

## 2020-09-03 RX ADMIN — MAGNESIUM HYDROXIDE 10 ML: 2400 SUSPENSION ORAL at 08:28

## 2020-09-03 NOTE — PROGRESS NOTES
Chart reviewed. No fever. Awaiting CMV PCR and EBV PCR. HIV Ab was negative.     Notified that WNV IgG was positive, IgM negative. This is indicative of prior infection rather than active disease.     I'll follow-up again once the CMV and EBV PCRs are back.

## 2020-09-03 NOTE — PROGRESS NOTES
Progress Note    Name: Vianney Villarreal ADMIT: 2020   : 1975  PCP: Gurwinder Almazan MD    MRN: [id number] LOS: 5 days   AGE/SEX: 45 y.o. female  ROOM: 98/1   Date of Encounter Visit: 20    Subjective:     Interval History: tolerated dose of IVIG last night.     REVIEW OF SYSTEMS:   no fever or chills.  No chest pain, palpitations or edema.   No shortness of breath. Occasional nonproductive cough.   No vomiting or diarrhea. Nausea. Abdominal cramps this am were not as bad and improved with reglan.   Denies any difficulty swallowing. good Bm today.   Felt more heaviness in legs when trying to do exercises today. Also reports right jaw discomfort    Objective:   Temp:  [98 °F (36.7 °C)-98.6 °F (37 °C)] 98.6 °F (37 °C)  Heart Rate:  [] 98  Resp:  [16-18] 18  BP: (113-133)/(67-83) 123/67   SpO2:  [97 %-100 %] 98 %  on    Device (Oxygen Therapy): room air  No intake or output data in the 24 hours ending 20 1228  Body mass index is 39.08 kg/m².      20  0100   Weight: 110 kg (242 lb)     Weight change:     Physical Exam   Constitutional: No distress.   HENT:   Head: Atraumatic.   Nose: Nose normal.   Slight right facial droop appearance with decreased nasolabile folds   Eyes: Conjunctivae are normal.   Cardiovascular: Regular rhythm.   No murmur heard.  Borderline tachycardic   Pulmonary/Chest: Effort normal. She has no wheezes. She has no rales.   Diminished bases   Abdominal: Soft. Bowel sounds are normal. She exhibits distension (mild but soft).   Musculoskeletal: She exhibits no edema.   Neurological: She is alert.   Slightly improved bilateral foot reflexes lzibet on the right   Skin: Skin is warm and dry. She is not diaphoretic.       Results Review:      Results from last 7 days   Lab Units 20  0619 20  0707 20  0611 20  0541   SODIUM mmol/L 137 137 137  --  139   POTASSIUM mmol/L 3.6 4.2 4.1 3.7 3.5   CHLORIDE mmol/L 106 104 107  --  107      CO2 mmol/L 24.1 22.3 22.6  --  21.4*   BUN mg/dL 9 9 8  --  7   CREATININE mg/dL 0.66 0.81 0.65  --  0.66   GLUCOSE mg/dL 108* 101* 99  --  99   CALCIUM mg/dL 8.8 9.5 9.2  --  9.1   AST (SGOT) U/L 17  --   --   --  28   ALT (SGPT) U/L 21  --   --   --  28     Estimated Creatinine Clearance: 138.1 mL/min (by C-G formula based on SCr of 0.66 mg/dL).      Results from last 7 days   Lab Units 08/29/20  1857   GLUCOSE mg/dL 94     Results from last 7 days   Lab Units 08/29/20  2007   TROPONIN T ng/mL <0.010         Results from last 7 days   Lab Units 08/29/20  0541   TSH uIU/mL 0.984     Results from last 7 days   Lab Units 08/30/20  0611   MAGNESIUM mg/dL 2.2           Invalid input(s): LDLCALC  Results from last 7 days   Lab Units 09/02/20  0619 08/31/20  1816 08/31/20  0707 08/30/20  0611  08/29/20  0541   WBC 10*3/mm3 3.39* 4.32 4.09 4.80  --  4.11   HEMOGLOBIN g/dL 12.1 12.9 12.7 12.9  --  12.8   HEMATOCRIT % 34.5 37.0 37.5 38.1  --  38.4   PLATELETS 10*3/mm3 266 287 295 290  --  303   MCV fL 87.8 88.7 90.4 91.4  --  90.8   MCH pg 30.8 30.9 30.6 30.9  --  30.3   MCHC g/dL 35.1 34.9 33.9 33.9  --  33.3   RDW % 11.8* 12.4 12.3 12.3  --  12.2*   RDW-SD fl 38.3 40.2 40.2 41.3  --  40.5   MPV fL 10.0 9.7 10.2 9.9  --  10.0   NEUTROPHIL % %  --  38.0* 42.8 42.0*   < >  --    LYMPHOCYTE % %  --  47.7* 40.8 42.3   < >  --    MONOCYTES % %  --  9.5 10.5 11.0   < >  --    EOSINOPHIL % %  --  3.7 4.2 3.5   < >  --    BASOPHIL % %  --  0.9 1.5 1.0   < >  --    IMM GRAN % %  --  0.2 0.2 0.2   < >  --    NEUTROS ABS 10*3/mm3  --  1.64* 1.75 2.01   < >  --    LYMPHS ABS 10*3/mm3  --  2.06 1.67 2.03   < >  --    MONOS ABS 10*3/mm3  --  0.41 0.43 0.53   < >  --    EOS ABS 10*3/mm3  --  0.16 0.17 0.17   < >  --    BASOS ABS 10*3/mm3  --  0.04 0.06 0.05   < >  --    IMMATURE GRANS (ABS) 10*3/mm3  --  0.01 0.01 0.01   < >  --    NRBC /100 WBC  --  0.0 0.0 0.0   < >  --     < > = values in this interval not displayed.                                  Results from last 7 days   Lab Units 08/30/20  0909   NITRITE UA  Negative           Imaging:  Imaging Results (Last 24 Hours)     ** No results found for the last 24 hours. **          I reviewed the patient's new clinical results and medications.         Medication Review:   Scheduled Meds:    budesonide-formoterol 2 puff Inhalation BID - RT   cetirizine 10 mg Oral Daily   docusate sodium 100 mg Oral BID   gabapentin 300 mg Oral TID   immune globulin (human) 20 g Intravenous Q24H   And      immune globulin (human) 10 g Intravenous Q24H   magnesium hydroxide 10 mL Oral Daily   pantoprazole 40 mg Oral Q AM   Pharmacy to enter IVIG order 400 mg/kg Intravenous Daily   polyethylene glycol 17 g Oral Daily   temazepam 15 mg Oral Once   tiotropium bromide monohydrate 2 puff Inhalation Daily - RT     Continuous Infusions:    hold 1 each     PRN Meds:.•  acetaminophen  •  aluminum-magnesium hydroxide-simethicone  •  bisacodyl  •  bisacodyl  •  diphenhydrAMINE  •  famotidine  •  hold  •  HYDROcodone-acetaminophen  •  hydrocortisone sodium succinate  •  metoclopramide  •  Morphine  •  ondansetron **OR** ondansetron  •  potassium chloride  •  potassium chloride  •  sodium chloride  •  temazepam    Problem List:     Active Hospital Problems    Diagnosis  POA   • **Paresthesia [R20.2]  Yes   • Dysphagia [R13.10]  Unknown   • Tachycardia [R00.0]  Unknown   • Environmental allergies [Z91.09]  Unknown   • Uterine fibroid [D25.9]  Unknown   • Abnormal CT of the abdomen [R93.5]  Unknown   • Constipation [K59.00]  Unknown   • Abdominal pain [R10.9]  Unknown   • Paresthesia and pain of both upper extremities [R20.2, M79.601, M79.602]  Yes   • 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       Assessment and Plan:     45-year-old female presenting with paresthesias      Paresthesia and pain of both upper extremities/ GBS (Guillain Mora syndrome) (CMS/HCC)  -LP with high protein consistent with GBS as suspected by neurology. CMV IgG and IgM elevated. HIV ab negative. WNV c/w prior infection not acute.   -Monitor CMV PCR and EBV PCR  -PT/OT/ ST following.   -IVIG (plan 5 days) and Neurontin per neurology- patient would like to consider weaning neurontin- defer to neuro  -Avoid narcotics.  -EKG reviewed and HR improved with possible signs of LVH but no obvious pericarditis. Still borderline tachycardic.   -appreciate ID input.     Asthma/ environmental allergies  -Continue bronchodilator Symbicort (replace home Breo), Spiriva and as needed albuterol  -montelukast on hold given possible guillian barre connection      Uterine fibroid  -noted on CT abdomen. Recent MRI lumbar spine noted one up to 2.5 cm suggestive of possible leiomyoma. No significant bleeding.  - Recommend outpatient follow with uterine ultrasound with OBGYN.       Abnormal CT of the abdomen/ abdominal pain/constipation  -appendolith without appendicitis noted.   - appreciate general surgery input. No surgical intervention required.   -diet as tolerated.   -continue bowel regimen, protonix and aspiration precautions.   -on PRN reglan per neuro    Dysphagia  -appreciate ST input- no sign of aspiration.   -encouraged small meals with wash after eating. If symptoms worsen will consider GI consult as she previously had esophageal dilation.    VTE prophylaxis: SCDs  CODE status: full code  Disposition: TBD    I discussed the patients findings and my recommendations with patient, family and nursing staff.    I wore full protective equipment throughout this patient encounter including a face mask and eye shield. Hand hygiene was performed upon entering patients room and when leaving the room.      Holly Andrea  CHANDRAKANT  09/03/20

## 2020-09-03 NOTE — PROGRESS NOTES
"GENERAL SURGERY PROGRESS NOTE    SUMMARY (A/P):  Mrs. Vianney Villarreal is a 45 y.o. year old lady with GBS and intermittent abdominal pain with incidental appendicolith and stool burden on recent CT scan. Abdominal pain improved, appears musculoskeletal. Tolerating a diet, had a BM today. Will sign off, please call with questions.    Chief Complaint: GBS    Interval Events: No acute events overnight.  Had a BM this morning. Tolerating her diet. One episode of abdominal pain yesterday that she believes is from her back. Some numbness in her fingers and toes this morning.     Review of Systems: No abdominal pain, no BM, no N/V.     O:/67 (BP Location: Left arm, Patient Position: Lying)   Pulse 98   Temp 98.6 °F (37 °C) (Oral)   Resp 18   Ht 167.6 cm (65.98\")   Wt 110 kg (242 lb)   LMP  (LMP Unknown)   SpO2 98%   Breastfeeding No   BMI 39.08 kg/m²     GENERAL: alert, well appearing, and in no distress, sitting up in the chair  HEENT: normocephalic, atraumatic, moist mucous membranes, clear sclerae   CHEST: no increased work of breathing, no wheezes, symmetric air entry  CARDIAC: regular rate and rhythm    ABDOMEN: soft, nondistended, nontender to palpation  EXTREMITIES: no cyanosis, clubbing, or edema   SKIN: Warm and moist, no rashes    LABS  Results from last 7 days   Lab Units 09/02/20  0619 08/31/20  1816 08/31/20  0707   WBC 10*3/mm3 3.39* 4.32 4.09   HEMOGLOBIN g/dL 12.1 12.9 12.7   HEMATOCRIT % 34.5 37.0 37.5   PLATELETS 10*3/mm3 266 287 295     Results from last 7 days   Lab Units 09/02/20  0619 08/31/20  0707 08/30/20  0611  08/29/20  0541   SODIUM mmol/L 137 137 137  --  139   POTASSIUM mmol/L 3.6 4.2 4.1   < > 3.5   CHLORIDE mmol/L 106 104 107  --  107   CO2 mmol/L 24.1 22.3 22.6  --  21.4*   BUN mg/dL 9 9 8  --  7   CREATININE mg/dL 0.66 0.81 0.65  --  0.66   CALCIUM mg/dL 8.8 9.5 9.2  --  9.1   BILIRUBIN mg/dL 0.9  --   --   --  0.8   ALK PHOS U/L 50  --   --   --  53   ALT (SGPT) U/L 21  --  "  --   --  28   AST (SGOT) U/L 17  --   --   --  28   GLUCOSE mg/dL 108* 101* 99  --  99    < > = values in this interval not displayed.           CHELSEA SALMON MD  General and Endoscopic Surgery  [facility]    [address], [address]  [place], [place], [address]  P: [phone]  F: [phone]

## 2020-09-03 NOTE — PROGRESS NOTES
DOS: 9/3/2020  NAME: Vianney Villarreal   : 1975  PCP: Gurwinder Almazan MD    CC: Generalized weakness with numbness and tingling    Subjective: No acute events overnight.  Had a little bit of worsening with feelings of heaviness in her legs.  Still has numbness that she feels is more pronounced in her feet.  She has gotten up to walk by herself around her room and out in the hallway and felt like she did okay.  Daughter was on speaker phone when I was in the room and I answered multiple questions from the daughter and the patient.    Objective:  Vital signs:   Vitals:    20 0258 20 0552 20 0742 20 0827   BP: 117/77 128/83  123/67   BP Location: Right arm Left arm  Left arm   Patient Position: Sitting Sitting  Lying   Pulse:  90 100 98   Resp:  17 16 18   Temp:  98.3 °F (36.8 °C)  98.6 °F (37 °C)   TempSrc:  Oral  Oral   SpO2: 99% 100% 97% 98%   Weight:       Height:           Current Facility-Administered Medications:   •  acetaminophen (TYLENOL) tablet 650 mg, 650 mg, Oral, Q6H PRN, Linh Neely APRN, 650 mg at 20 0601  •  aluminum-magnesium hydroxide-simethicone (MAALOX MAX) 400-400-40 MG/5ML suspension 15 mL, 15 mL, Oral, Q6H PRN, Eduardo Treviño APRN  •  bisacodyl (DULCOLAX) EC tablet 5 mg, 5 mg, Oral, Daily PRN, Eduardo Treviño APRN  •  bisacodyl (DULCOLAX) suppository 10 mg, 10 mg, Rectal, Daily PRN, Eduardo Treviño APRN, 10 mg at 20  •  budesonide-formoterol (SYMBICORT) 80-4.5 MCG/ACT inhaler 2 puff, 2 puff, Inhalation, BID - RT, Nolan Weaver MD, 2 puff at 20 0738  •  cetirizine (zyrTEC) tablet 10 mg, 10 mg, Oral, Daily, Holly Andrea APRN, 10 mg at 20  •  diphenhydrAMINE (BENADRYL) injection 50 mg, 50 mg, Intravenous, Once PRN, Naresh Thurston MD  •  docusate sodium (COLACE) capsule 100 mg, 100 mg, Oral, BID, Rose Mary Smith MD, 100 mg at 20  •  famotidine (PEPCID) injection 20 mg, 20 mg, Intravenous, Once  PRN, Naresh Thurston MD  •  gabapentin (NEURONTIN) capsule 300 mg, 300 mg, Oral, TID, Gómez Nichols MD, 300 mg at 09/03/20 0828  •  Hold medication, 1 each, Does not apply, Continuous PRN, Gómez Nichols MD  •  HYDROcodone-acetaminophen (NORCO) 5-325 MG per tablet 1 tablet, 1 tablet, Oral, Q6H PRN, Holly Andrea APRN  •  hydrocortisone sodium succinate (Solu-CORTEF) injection 100 mg, 100 mg, Intravenous, Once PRN, Naresh Thurston MD  •  immune globulin (human) (GAMUNEX-C) infusion 20 g, 20 g, Intravenous, Q24H, 20 g at 09/02/20 2228 **AND** immune globulin (human) (GAMUNEX-C) infusion 10 g, 10 g, Intravenous, Q24H, Naresh Thurston MD, 10 g at 09/03/20 0209  •  magnesium hydroxide (MILK OF MAGNESIA) suspension 2400 mg/10mL 10 mL, 10 mL, Oral, Daily, Rose Mary Smith MD, 10 mL at 09/03/20 0828  •  metoclopramide (REGLAN) tablet 5 mg, 5 mg, Oral, TID PRN, Naresh Thurston MD, 5 mg at 08/31/20 2223  •  morphine injection 4 mg, 4 mg, Intravenous, Q4H PRN, Nolan Weaver MD, 4 mg at 08/30/20 1903  •  ondansetron (ZOFRAN) tablet 4 mg, 4 mg, Oral, Q6H PRN **OR** ondansetron (ZOFRAN) injection 4 mg, 4 mg, Intravenous, Q6H PRN, Eduardo Treviño APRN, 4 mg at 08/30/20 1213  •  pantoprazole (PROTONIX) EC tablet 40 mg, 40 mg, Oral, Q AM, Nolan Weaver MD, 40 mg at 09/03/20 0601  •  Pharmacy to enter IVIG order 45,000 mg, 400 mg/kg, Intravenous, Daily, Naresh Thurston MD  •  polyethylene glycol (MIRALAX) packet 17 g, 17 g, Oral, Daily, Nolan Weaver MD, 17 g at 09/03/20 0828  •  potassium chloride (KLOR-CON) packet 40 mEq, 40 mEq, Oral, PRN, Nolan Weaver MD  •  potassium chloride (MICRO-K) CR capsule 40 mEq, 40 mEq, Oral, PRN, Nolan Weaver MD, 40 mEq at 08/29/20 1633  •  sodium chloride 0.9 % flush 10 mL, 10 mL, Intravenous, PRN, Eduardo Treviño, APRN, 10 mL at 08/31/20 0945  •  temazepam (RESTORIL) capsule 15 mg, 15 mg, Oral, Once, Gómez Nichols MD  •  temazepam (RESTORIL) capsule 15 mg, 15 mg, Oral,  Nightly PRN, Gómez Nichols MD  •  tiotropium (SPIRIVA RESPIMAT) 2.5 mcg/act aerosol solution inhaler, 2 puff, Inhalation, Daily - RT, Nolan Weaver MD, 2 puff at 09/03/20 0737    PRN meds  •  acetaminophen  •  aluminum-magnesium hydroxide-simethicone  •  bisacodyl  •  bisacodyl  •  diphenhydrAMINE  •  famotidine  •  hold  •  HYDROcodone-acetaminophen  •  hydrocortisone sodium succinate  •  metoclopramide  •  Morphine  •  ondansetron **OR** ondansetron  •  potassium chloride  •  potassium chloride  •  sodium chloride  •  temazepam    No current facility-administered medications on file prior to encounter.      Current Outpatient Medications on File Prior to Encounter   Medication Sig   • BREO ELLIPTA 200-25 MCG/INH inhaler Inhale 1 puff Every Morning.   • Calcium Carbonate Antacid (SLADE-SELTZER ANTACID PO)    • cetirizine (zyrTEC) 5 MG tablet Take 5 mg by mouth Every Night.   • EPIPEN 2-ANYI 0.3 MG/0.3ML solution auto-injector injection Inject 0.3 mL into the appropriate muscle as directed by prescriber Take As Directed. TAKES ALLERGY SHOTS   • gabapentin (NEURONTIN) 300 MG capsule 1 p.o. nightly   • montelukast (SINGULAIR) 10 MG tablet Take 10 mg by mouth Every Night.   • pantoprazole (PROTONIX) 40 MG EC tablet TAKE 1 TABLET DAILY (NEED APPOINTMENT IN JULY)   • SPIRIVA RESPIMAT 1.25 MCG/ACT aerosol solution Inhale 2 puffs Every Morning.   • VENTOLIN  (90 Base) MCG/ACT inhaler INHALE 2 PUFFS BY MOUTH EVERY FOUR HOURS AS NEEDED FOR WHEEZING       General appearance: Obese female, NAD, alert and cooperative, well groomed  HEENT: Normocephalic, atraumatic, PERRL, no masses or tenderness  COR: RRR  Resp: Even and unlabored  Extremities: No obvious edema.  Skin: warm, dry     Neurological:   MS: oriented x3, recent/remote memory intact, normal attention/concentration, language intact, no neglect, normal fund of knowledge  CN: visual acuity grossly normal, visual fields full, PERRL, EOMI, facial sensation equal,  no facial droop, hearing symmetric, palate elevates symmetrically, shoulder shrug equal, tongue midline  Motor: 4/5 in BUE, 4/5 in BLE, mild drift bilaterally in BLE  Reflexes: Areflexic in lower extremities  Sensory: light touch sensation intact in all 4 ext.  Coordination: Normal finger to nose test  Gait and station: JERRICA 2-3  person assist   Rapid alternating movements: normal finger to thumb tap    Physical exam performed, no changes noted.    Laboratory results:  Lab Results   Component Value Date    TSH 0.984 08/29/2020     Lab Results   Component Value Date    [id number] 842 08/29/2020     Lab Results   Component Value Date    HGBA1C 5.20 08/11/2020     Lab Results   Component Value Date    GLUCOSE 108 (H) 09/02/2020    BUN 9 09/02/2020    CREATININE 0.66 09/02/2020    EGFRIFAFRI 118 09/02/2020    BCR 13.6 09/02/2020    K 3.6 09/02/2020    CO2 24.1 09/02/2020    CALCIUM 8.8 09/02/2020    ALBUMIN 3.70 09/02/2020    AST 17 09/02/2020    ALT 21 09/02/2020     Lab Results   Component Value Date    WBC 3.39 (L) 09/02/2020    HGB 12.1 09/02/2020    HCT 34.5 09/02/2020    MCV 87.8 09/02/2020     09/02/2020     Brief Urine Lab Results  (Last result in the past 365 days)      Color   Clarity   Blood   Leuk Est   Nitrite   Protein   CREAT   Urine HCG        08/30/20 0909 Yellow Clear Negative Negative Negative Negative             No results found for: ACANTHNAEG, AFBCX, BPERTUSSISCX, BLOODCX  No results found for: BCIDPCR, CXREFLEX, CSFCX, CULTURETIS  No results found for: CULTURES, HSVCX, URCX  No results found for: EYECULTURE, GCCX, LABHSV  No results found for: LEGIONELLA, MRSACX, MUMPSCX, MYCOPLASCX  No results found for: NOCARDIACX, STOOLCX  No results found for: THROATCX, UNSTIMCULT, URINECX, CULTURE, VZVCULTUR  No results found for: VIRALCULTU, WOUNDCX  Pain Management Panel     There is no flowsheet data to display.            Review and interpretation of imaging:  Mri Brain Without Contrast    Result  Date: 8/27/2020  No evidence of acute infarction. Areas of T2 hyperintensity involving the frontal sinus of midline which may represent a mucus retention cyst or mucocele. There is no evidence of an air-fluid level within the paranasal sinuses to suggest acute sinusitis. A small mucus retention cyst involving the right maxillary sinus inferiorly is also present.  MRI EXAMINATION OF THE LUMBAR SPINE WITHOUT CONTRAST:  There is mild dextroscoliosis with the apex level of L4. The conus is at L1-L2 which is slightly low lying. There is a grade 1 retrolisthesis of L5 upon S1 estimated to be approximately 2 mm.  L1-L2: There is no evidence of disc bulge or herniation.  L2-L3: Mild facet degenerative disease is present bilaterally.  L3-L4: Mild to moderate facet degenerative disease is present bilaterally. There is no evidence of disc bulge or herniation.  L4-L5: Mild-to-moderate facet degenerative disease is present bilaterally. There is no evidence of disc bulge or herniation.  L5-S1: Mild-to-moderate facet degenerative disease is present bilaterally. There is no evidence of disc bulge or herniation.  The uterus is only partially visualized but there is likely a leiomyoma involving the uterus measuring approximately 2.5 cm in size involving the lower uterine segment. A pelvic ultrasound is suggested.  IMPRESSION: 1. Mild degenerative disease involving the lumbar spine as described with no evidence of disc herniation. The conus is slightly low lying. 2. Partial visualization of an area of signal loss involving the lower uterine segment on the sagittal T2 sequence measuring approximately 2.5 cm in size. This likely represents a leiomyoma. Further evaluation with a pelvic ultrasound is recommended.  The above information was called to and discussed with Dr. Mosqueda who is covering for Dr. Almazan on 08/26/2020 at 1703 hours.  This report was finalized on 8/27/2020 11:37 AM by Dr. Harlan Epperson M.D.      Mri Lumbar Spine  Without Contrast    Result Date: 8/27/2020  No evidence of acute infarction. Areas of T2 hyperintensity involving the frontal sinus of midline which may represent a mucus retention cyst or mucocele. There is no evidence of an air-fluid level within the paranasal sinuses to suggest acute sinusitis. A small mucus retention cyst involving the right maxillary sinus inferiorly is also present.  MRI EXAMINATION OF THE LUMBAR SPINE WITHOUT CONTRAST:  There is mild dextroscoliosis with the apex level of L4. The conus is at L1-L2 which is slightly low lying. There is a grade 1 retrolisthesis of L5 upon S1 estimated to be approximately 2 mm.  L1-L2: There is no evidence of disc bulge or herniation.  L2-L3: Mild facet degenerative disease is present bilaterally.  L3-L4: Mild to moderate facet degenerative disease is present bilaterally. There is no evidence of disc bulge or herniation.  L4-L5: Mild-to-moderate facet degenerative disease is present bilaterally. There is no evidence of disc bulge or herniation.  L5-S1: Mild-to-moderate facet degenerative disease is present bilaterally. There is no evidence of disc bulge or herniation.  The uterus is only partially visualized but there is likely a leiomyoma involving the uterus measuring approximately 2.5 cm in size involving the lower uterine segment. A pelvic ultrasound is suggested.  IMPRESSION: 1. Mild degenerative disease involving the lumbar spine as described with no evidence of disc herniation. The conus is slightly low lying. 2. Partial visualization of an area of signal loss involving the lower uterine segment on the sagittal T2 sequence measuring approximately 2.5 cm in size. This likely represents a leiomyoma. Further evaluation with a pelvic ultrasound is recommended.  The above information was called to and discussed with Dr. Mosqueda who is covering for Dr. Almazan on 08/26/2020 at 1703 hours.  This report was finalized on 8/27/2020 11:37 AM by Dr. Harlan Epperson M.D.   "    Ir Lumbar Puncture Diag/thera    Result Date: 8/31/2020  Successful lumbar puncture.  This report was finalized on 8/31/2020 3:21 PM by Dr. Sander Ortiz M.D.      Xr Abdomen Kub    Result Date: 8/29/2020  Mild to moderate stool throughout the colon. No evidence to suggest bowel obstruction.  This report was finalized on 8/29/2020 7:52 PM by Dr. Edilberto Bergeron M.D.      Impression/Assessment:  This is a 45-year-old female with past medical history of asthma and GERD who presented to the hospital on 8/29/2020 with complaints of bilateral upper extremity and lower extremity numbness and tingling.  Patient reported that her paresthesias started around 3 to 4 weeks ago that was accompanied by stomach spasms that would radiate to her back and feelings of body \"heaviness\" while ambulating.  The numbness first started in her feet and then spread to her legs and eventually started in her hands and moved up her arms.  She also reported some numbness in her.  Area when she would wipe with toilet paper but denied any bowel or bladder incontinence.  She originally presented to [facility] on 8/26 where she underwent a MRI brain  WO that was reportedly normal.  She was discharged home however she returned the next day with similar complaints and in addition shortness of breath.  She was transferred here for further work-up and evaluation.     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, positive CMV antibodies, generalized anxiety     Work up to date:  Labs: TSH 0.94, B12 842, mag 2.2, U/A negative, CMV IgG 8.40, CMV IgM 80.8, EBV VCA IgG > 600, EBV VCA IgM <36.0, IgA 210, IgG 1253, IgM 176, RPR nonreactive, HIV 1/HIV-2 antibody non-reactive, 8/11 STEF negative, 8/11 rheumatoid factor <10.0, LP T NC 1, glucose 72, protein 71.0, colorless and clear, IgG index 0.6, IgG synthesis rate 6.1, IgG CSF 8.1, IgG/ALB " ratio 0.17, myelin basic protein 2.4, no oligoclonal bands; [place] IgG positive, [place] IgM negative, CMV PCR pending  8/26 MRI Brain WO: No evidence of restricted diffusion to suggest acute infarction, area of T2 hyperintensity involving the frontal sinus suggestive of a retention cyst or mucocele.  8/26 MRI L-spine WO: Mild degenerative disease, area of signal loss involving the lower uterine segment on the sagittal T2 sequence radiology recommended a pelvic ultrasound.    Plan:  Tolerated IVIG dose well but requested that it be given slow because she feels she has reactions to the medication when it is given at a faster rate. Her [place] IgG came back positive but IgM was negative. CMV PCR still pending.   No new complaints. Continue with IVIG, total of 5 doses planned.   PT/OT/ST  I had lengthy conversation with the patient and the daughter today via telephone about recovery and treatment plans.  Therapies as written. Call RRT for any acute neurological changes. We will continue to follow and advise.    Case discussed with patient and Dr. Thurston, and he agrees with plan above.   CHANDRAKANT Rubio

## 2020-09-04 LAB
B BURGDOR IGG PATRN SER IB-IMP: NEGATIVE
B BURGDOR IGM PATRN SER IB-IMP: NEGATIVE
DEPRECATED RDW RBC AUTO: 43.1 FL (ref 37–54)
ERYTHROCYTE [DISTWIDTH] IN BLOOD BY AUTOMATED COUNT: 12.4 % (ref 12.3–15.4)
HCT VFR BLD AUTO: 34.3 % (ref 34–46.6)
HGB BLD-MCNC: 11.3 G/DL (ref 12–15.9)
MCH RBC QN AUTO: 30.8 PG (ref 26.6–33)
MCHC RBC AUTO-ENTMCNC: 32.9 G/DL (ref 31.5–35.7)
MCV RBC AUTO: 93.5 FL (ref 79–97)
P18 AB. IGG: ABNORMAL
P23 AB. IGG CSF: PRESENT
P23 AB. IGM CSF: ABNORMAL
P28 AB. IGG CSF: ABNORMAL
P30 AB. IGG CSF: ABNORMAL
P39 AB. IGG CSF: ABNORMAL
P39 AB. IGM CSF: ABNORMAL
P41 AB. IGG CSF: PRESENT
P41 AB. IGM CSF: ABNORMAL
P45 AB. IGG CSF: ABNORMAL
P58 AB. IGG CSF: ABNORMAL
P66 AB. IGG CSF: ABNORMAL
P93 AB. IGG CSF: PRESENT
PLATELET # BLD AUTO: 264 10*3/MM3 (ref 140–450)
PMV BLD AUTO: 9.9 FL (ref 6–12)
RBC # BLD AUTO: 3.67 10*6/MM3 (ref 3.77–5.28)
WBC # BLD AUTO: 3.02 10*3/MM3 (ref 3.4–10.8)

## 2020-09-04 PROCEDURE — 94799 UNLISTED PULMONARY SVC/PX: CPT

## 2020-09-04 PROCEDURE — 25010000002 IMMUNE GLOBULIN (HUMAN) 10 GM/100ML SOLUTION: Performed by: PSYCHIATRY & NEUROLOGY

## 2020-09-04 PROCEDURE — 25010000002 IMMUNE GLOBULIN (HUMAN) 20 GM/200ML SOLUTION: Performed by: PSYCHIATRY & NEUROLOGY

## 2020-09-04 PROCEDURE — 85027 COMPLETE CBC AUTOMATED: CPT | Performed by: NURSE PRACTITIONER

## 2020-09-04 PROCEDURE — 99232 SBSQ HOSP IP/OBS MODERATE 35: CPT | Performed by: INTERNAL MEDICINE

## 2020-09-04 PROCEDURE — 99253 IP/OBS CNSLTJ NEW/EST LOW 45: CPT | Performed by: INTERNAL MEDICINE

## 2020-09-04 PROCEDURE — 99232 SBSQ HOSP IP/OBS MODERATE 35: CPT | Performed by: NURSE PRACTITIONER

## 2020-09-04 RX ORDER — DOCUSATE SODIUM 100 MG/1
100 CAPSULE, LIQUID FILLED ORAL 2 TIMES DAILY PRN
Status: DISCONTINUED | OUTPATIENT
Start: 2020-09-04 | End: 2020-09-05 | Stop reason: HOSPADM

## 2020-09-04 RX ORDER — SUCRALFATE 1 G/1
1 TABLET ORAL
Status: DISCONTINUED | OUTPATIENT
Start: 2020-09-04 | End: 2020-09-05 | Stop reason: HOSPADM

## 2020-09-04 RX ADMIN — IMMUNE GLOBULIN (HUMAN) 10 G: 10 INJECTION INTRAVENOUS; SUBCUTANEOUS at 22:18

## 2020-09-04 RX ADMIN — GABAPENTIN 300 MG: 300 CAPSULE ORAL at 17:53

## 2020-09-04 RX ADMIN — GABAPENTIN 300 MG: 300 CAPSULE ORAL at 22:18

## 2020-09-04 RX ADMIN — METOCLOPRAMIDE 5 MG: 5 TABLET ORAL at 10:49

## 2020-09-04 RX ADMIN — GABAPENTIN 300 MG: 300 CAPSULE ORAL at 10:37

## 2020-09-04 RX ADMIN — CETIRIZINE HYDROCHLORIDE 10 MG: 10 TABLET ORAL at 10:37

## 2020-09-04 RX ADMIN — IMMUNE GLOBULIN (HUMAN) 20 G: 10 INJECTION INTRAVENOUS; SUBCUTANEOUS at 01:46

## 2020-09-04 RX ADMIN — BUDESONIDE AND FORMOTEROL FUMARATE DIHYDRATE 2 PUFF: 80; 4.5 AEROSOL RESPIRATORY (INHALATION) at 21:11

## 2020-09-04 RX ADMIN — PANTOPRAZOLE SODIUM 40 MG: 40 TABLET, DELAYED RELEASE ORAL at 05:06

## 2020-09-04 RX ADMIN — FAMOTIDINE 20 MG: 10 INJECTION INTRAVENOUS at 03:52

## 2020-09-04 RX ADMIN — ACETAMINOPHEN 650 MG: 325 TABLET, FILM COATED ORAL at 16:30

## 2020-09-04 RX ADMIN — POLYETHYLENE GLYCOL 3350 17 G: 17 POWDER, FOR SOLUTION ORAL at 10:37

## 2020-09-04 RX ADMIN — SUCRALFATE 1 G: 1 TABLET ORAL at 22:18

## 2020-09-04 RX ADMIN — SUCRALFATE 1 G: 1 TABLET ORAL at 17:59

## 2020-09-04 RX ADMIN — MAGNESIUM HYDROXIDE 10 ML: 2400 SUSPENSION ORAL at 10:38

## 2020-09-04 RX ADMIN — SODIUM CHLORIDE, PRESERVATIVE FREE 10 ML: 5 INJECTION INTRAVENOUS at 16:30

## 2020-09-04 RX ADMIN — ALUMINUM HYDROXIDE, MAGNESIUM HYDROXIDE, AND DIMETHICONE 15 ML: 400; 400; 40 SUSPENSION ORAL at 02:35

## 2020-09-04 NOTE — PROGRESS NOTES
DOS: 2020  NAME: Vianney Villarreal   : 1975  PCP: Gurwinder Almazan MD    CC: Upper and lower extremity weakness, numbness, and tingling    Subjective: No acute events overnight.  Her numbness and tingling has improved in her feet and hands.  She was able to get up and walk to the bathroom again today with no issues with her balance.  She is feeling better today.  Received her fourth dose of IVIG last night and had to get a new IV due to some swelling in her arm.  No family at bedside.    Objective:  Vital signs:   Vitals:    20 0259 20 0338 20 0437 20 0514   BP: 116/75 119/76 114/85 114/68   BP Location: Left arm Left arm Left arm Left arm   Patient Position: Lying Lying Lying Sitting   Pulse: 81 86 82 83   Resp: 16 16 16 16   Temp: 98.5 °F (36.9 °C) 98.4 °F (36.9 °C) 98.3 °F (36.8 °C) 98 °F (36.7 °C)   TempSrc: Oral Oral Oral Oral   SpO2: 98% 96% 100% 97%   Weight:       Height:           Current Facility-Administered Medications:   •  acetaminophen (TYLENOL) tablet 650 mg, 650 mg, Oral, Q6H PRN, Linh Neely APRN, 650 mg at 20 0601  •  aluminum-magnesium hydroxide-simethicone (MAALOX MAX) 400-400-40 MG/5ML suspension 15 mL, 15 mL, Oral, Q6H PRN, Eduardo Treviño APRN, 15 mL at 20 0235  •  bisacodyl (DULCOLAX) EC tablet 5 mg, 5 mg, Oral, Daily PRN, Eduardo Treviño APRN  •  bisacodyl (DULCOLAX) suppository 10 mg, 10 mg, Rectal, Daily PRN, Eduardo Treviño APRN, 10 mg at 20  •  budesonide-formoterol (SYMBICORT) 80-4.5 MCG/ACT inhaler 2 puff, 2 puff, Inhalation, BID - RT, Nolan Weaver MD, 2 puff at 20  •  cetirizine (zyrTEC) tablet 10 mg, 10 mg, Oral, Daily, Holly Andrea APRN, 10 mg at 20  •  diphenhydrAMINE (BENADRYL) injection 50 mg, 50 mg, Intravenous, Once PRN, Naresh Thurston MD  •  docusate sodium (COLACE) capsule 100 mg, 100 mg, Oral, BID, Rose Mary Smith MD, 100 mg at 20  •  gabapentin (NEURONTIN)  capsule 300 mg, 300 mg, Oral, TID, Gómez Nichols MD, 300 mg at 09/03/20 2127  •  Hold medication, 1 each, Does not apply, Continuous PRN, Gómez Nichols MD  •  HYDROcodone-acetaminophen (NORCO) 5-325 MG per tablet 1 tablet, 1 tablet, Oral, Q6H PRN, Holly Andrea APRN  •  hydrocortisone sodium succinate (Solu-CORTEF) injection 100 mg, 100 mg, Intravenous, Once PRN, Naresh Thurston MD  •  immune globulin (human) (GAMUNEX-C) infusion 20 g, 20 g, Intravenous, Q24H, Stopped at 09/04/20 0459 **AND** immune globulin (human) (GAMUNEX-C) infusion 10 g, 10 g, Intravenous, Q24H, Naresh Thurston MD, Stopped at 09/04/20 0145  •  magnesium hydroxide (MILK OF MAGNESIA) suspension 2400 mg/10mL 10 mL, 10 mL, Oral, Daily, Rose Mary Smith MD, 10 mL at 09/03/20 0828  •  metoclopramide (REGLAN) tablet 5 mg, 5 mg, Oral, TID PRN, Naresh Thurston MD, 5 mg at 09/03/20 2128  •  morphine injection 4 mg, 4 mg, Intravenous, Q4H PRN, Nolan Weaver MD, 4 mg at 08/30/20 1903  •  ondansetron (ZOFRAN) tablet 4 mg, 4 mg, Oral, Q6H PRN **OR** ondansetron (ZOFRAN) injection 4 mg, 4 mg, Intravenous, Q6H PRN, Eduardo Treviño APRN, 4 mg at 08/30/20 1213  •  pantoprazole (PROTONIX) EC tablet 40 mg, 40 mg, Oral, Q AM, Nolan Weaver MD, 40 mg at 09/04/20 0506  •  Pharmacy to enter IVIG order 45,000 mg, 400 mg/kg, Intravenous, Daily, Naresh Thurston MD  •  polyethylene glycol (MIRALAX) packet 17 g, 17 g, Oral, Daily, Nolan Weaver MD, 17 g at 09/03/20 0828  •  potassium chloride (KLOR-CON) packet 40 mEq, 40 mEq, Oral, PRN, Nolan Weaver MD  •  potassium chloride (MICRO-K) CR capsule 40 mEq, 40 mEq, Oral, PRN, Nolan Weaver MD, 40 mEq at 08/29/20 1633  •  sodium chloride 0.9 % flush 10 mL, 10 mL, Intravenous, PRN, Eduardo Treviño, APRN, 10 mL at 08/31/20 0945  •  temazepam (RESTORIL) capsule 15 mg, 15 mg, Oral, Once, Gómez Nichols MD  •  temazepam (RESTORIL) capsule 15 mg, 15 mg, Oral, Nightly PRN, Gómez Nichols MD  •  tiotropium  (SPIRIVA RESPIMAT) 2.5 mcg/act aerosol solution inhaler, 2 puff, Inhalation, Daily - RT, Nolan Weaver MD, 2 puff at 09/03/20 0737    PRN meds  •  acetaminophen  •  aluminum-magnesium hydroxide-simethicone  •  bisacodyl  •  bisacodyl  •  diphenhydrAMINE  •  hold  •  HYDROcodone-acetaminophen  •  hydrocortisone sodium succinate  •  metoclopramide  •  Morphine  •  ondansetron **OR** ondansetron  •  potassium chloride  •  potassium chloride  •  sodium chloride  •  temazepam    No current facility-administered medications on file prior to encounter.      Current Outpatient Medications on File Prior to Encounter   Medication Sig   • BREO ELLIPTA 200-25 MCG/INH inhaler Inhale 1 puff Every Morning.   • Calcium Carbonate Antacid (SLADE-SELTZER ANTACID PO)    • cetirizine (zyrTEC) 5 MG tablet Take 5 mg by mouth Every Night.   • EPIPEN 2-ANYI 0.3 MG/0.3ML solution auto-injector injection Inject 0.3 mL into the appropriate muscle as directed by prescriber Take As Directed. TAKES ALLERGY SHOTS   • gabapentin (NEURONTIN) 300 MG capsule 1 p.o. nightly   • montelukast (SINGULAIR) 10 MG tablet Take 10 mg by mouth Every Night.   • pantoprazole (PROTONIX) 40 MG EC tablet TAKE 1 TABLET DAILY (NEED APPOINTMENT IN JULY)   • SPIRIVA RESPIMAT 1.25 MCG/ACT aerosol solution Inhale 2 puffs Every Morning.   • VENTOLIN  (90 Base) MCG/ACT inhaler INHALE 2 PUFFS BY MOUTH EVERY FOUR HOURS AS NEEDED FOR WHEEZING     General appearance: Obese female, NAD, alert and cooperative, well groomed  HEENT: Normocephalic, atraumatic, PERRL, no masses or tenderness  COR: RRR  Resp: Even and unlabored  Extremities: No obvious edema.  Skin: warm, dry     Neurological:   MS: oriented x3, recent/remote memory intact, normal attention/concentration, language intact, no neglect, normal fund of knowledge  CN: visual acuity grossly normal, visual fields full, PERRL, EOMI, facial sensation equal, no facial droop, hearing symmetric, palate elevates  symmetrically, shoulder shrug equal, tongue midline  Motor: 4/5 in BUE, 4/5 in BLE  Reflexes: Areflexic in lower extremities  Sensory: light touch sensation intact in all 4 ext.  Coordination: Normal finger to nose test  Gait and station: no ataxia  Rapid alternating movements: normal finger to thumb tap    Physical exam performed, changes noted.    Laboratory results:  Lab Results   Component Value Date    TSH 0.984 08/29/2020     Lab Results   Component Value Date    GCPAVTAD58 842 08/29/2020     Lab Results   Component Value Date    HGBA1C 5.20 08/11/2020     Lab Results   Component Value Date    GLUCOSE 108 (H) 09/02/2020    BUN 9 09/02/2020    CREATININE 0.66 09/02/2020    EGFRIFAFRI 118 09/02/2020    BCR 13.6 09/02/2020    K 3.6 09/02/2020    CO2 24.1 09/02/2020    CALCIUM 8.8 09/02/2020    ALBUMIN 3.70 09/02/2020    AST 17 09/02/2020    ALT 21 09/02/2020     Lab Results   Component Value Date    WBC 3.02 (L) 09/04/2020    HGB 11.3 (L) 09/04/2020    HCT 34.3 09/04/2020    MCV 93.5 09/04/2020     09/04/2020     Brief Urine Lab Results  (Last result in the past 365 days)      Color   Clarity   Blood   Leuk Est   Nitrite   Protein   CREAT   Urine HCG        08/30/20 0909 Yellow Clear Negative Negative Negative Negative             No results found for: ACANTHNAEG, AFBCX, BPERTUSSISCX, BLOODCX  No results found for: BCIDPCR, CXREFLEX, CSFCX, CULTURETIS  No results found for: CULTURES, HSVCX, URCX  No results found for: EYECULTURE, GCCX, LABHSV  No results found for: LEGIONELLA, MRSACX, MUMPSCX, MYCOPLASCX  No results found for: NOCARDIACX, STOOLCX  No results found for: THROATCX, UNSTIMCULT, URINECX, CULTURE, VZVCULTUR  No results found for: VIRALCULTU, WOUNDCX  Pain Management Panel     There is no flowsheet data to display.          Review and interpretation of imaging:  Ir Lumbar Puncture Diag/thera    Result Date: 8/31/2020  Successful lumbar puncture.  This report was finalized on 8/31/2020 3:21 PM by  "Dr. Sander Ortiz M.D.      Xr Abdomen Kub    Result Date: 8/29/2020  Mild to moderate stool throughout the colon. No evidence to suggest bowel obstruction.  This report was finalized on 8/29/2020 7:52 PM by Dr. Edilberto Bergeron M.D.      Impression/Assessment:  This is a 45-year-old female with past medical history of asthma and GERD who presented to the hospital on 8/29/2020 with complaints of bilateral upper extremity and lower extremity numbness and tingling.  Patient reported that her paresthesias started around 3 to 4 weeks ago that was accompanied by stomach spasms that would radiate to her back and feelings of body \"heaviness\" while ambulating.  The numbness first started in her feet and then spread to her legs and eventually started in her hands and moved up her arms.  She also reported some numbness in her.  Area when she would wipe with toilet paper but denied any bowel or bladder incontinence.  She originally presented to [facility] on 8/26 where she underwent a MRI brain  WO that was reportedly normal.  She was discharged home however she returned the next day with similar complaints and in addition shortness of breath.  She was transferred here for further work-up and evaluation.     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, positive CMV antibodies, generalized anxiety     Work up to date:  Labs: TSH 0.94, B12 842, mag 2.2, U/A negative, CMV IgG 8.40, CMV IgM 80.8, EBV VCA IgG > 600, EBV VCA IgM <36.0, IgA 210, IgG 1253, IgM 176, RPR nonreactive, HIV 1/HIV-2 antibody non-reactive, 8/11 STEF negative, 8/11 rheumatoid factor <10.0, LP T NC 1, glucose 72, protein 71.0, colorless and clear, IgG index 0.6, IgG synthesis rate 6.1, IgG CSF 8.1, IgG/ALB ratio 0.17, myelin basic protein 2.4, no oligoclonal bands; West Nile IgG positive, West Nile IgM negative, CMV PCR positive, EBV PCR " pending  8/26 MRI Brain WO: No evidence of restricted diffusion to suggest acute infarction, area of T2 hyperintensity involving the frontal sinus suggestive of a retention cyst or mucocele.  8/26 MRI L-spine WO: Mild degenerative disease, area of signal loss involving the lower uterine segment on the sagittal T2 sequence radiology recommended a pelvic ultrasound.    Plan:  Will be receiving dose 5/5 today. Tolerated therapy well last night other than an issue with her IV she is feeling better. Numbness and tingling improving in her hands and feet.  Appears her CMV PCR was positive, ID following and will await there recs.  PT/OT  Therapies as written. Call RRT for any acute neurological changes. We will continue to follow and advise.    Case discussed with patient, Holly STALLINGS, and Dr. Thurston, and he agrees with plan above.   CHANDRAKANT Rubio

## 2020-09-04 NOTE — PROGRESS NOTES
Progress Note    Name: Vianney Villarreal ADMIT: 2020   : 1975  PCP: Gurwinder Almazan MD    MRN: [id number] LOS: 6 days   AGE/SEX: 45 y.o. female  ROOM: 981   Date of Encounter Visit: 20    Subjective:     Interval History: tolerated dose of IVIG last night.     REVIEW OF SYSTEMS:   no fever or chills.  No chest pain, palpitations or edema.   No shortness of breath.   No vomiting or diarrhea. Nausea. Abdominal cramps and LUQ pain worse after eating yesterday improved some with pepcid, Mylanta and reglan.   Denies any difficulty swallowing. Loose BM yest  Leg heaviness a little better.    Objective:   Temp:  [98 °F (36.7 °C)-98.9 °F (37.2 °C)] 98 °F (36.7 °C)  Heart Rate:  [] 90  Resp:  [16-18] 16  BP: (108-120)/(68-85) 114/78   SpO2:  [96 %-100 %] 99 %  on    Device (Oxygen Therapy): room air  No intake or output data in the 24 hours ending 20 0958  Body mass index is 39.08 kg/m².      20  0100   Weight: 110 kg (242 lb)     Weight change:     Physical Exam   Constitutional: No distress.   HENT:   Head: Atraumatic.   Nose: Nose normal.   Eyes: Conjunctivae are normal.   Cardiovascular: Normal rate and regular rhythm.   No murmur heard.  Pulmonary/Chest: Effort normal. She has no wheezes. She has no rales.   Diminished bases   Abdominal: Soft. Bowel sounds are normal. She exhibits no distension. There is tenderness (epigastric).   Musculoskeletal: She exhibits no edema.   Neurological: She is alert.   Skin: Skin is warm and dry. She is not diaphoretic.       Results Review:      Results from last 7 days   Lab Units 20  0619 20  0707 20  0611 20  0541   SODIUM mmol/L 137 137 137  --  139   POTASSIUM mmol/L 3.6 4.2 4.1 3.7 3.5   CHLORIDE mmol/L 106 104 107  --  107   CO2 mmol/L 24.1 22.3 22.6  --  21.4*   BUN mg/dL 9 9 8  --  7   CREATININE mg/dL 0.66 0.81 0.65  --  0.66   GLUCOSE mg/dL 108* 101* 99  --  99   CALCIUM mg/dL 8.8 9.5 9.2  --  9.1      AST (SGOT) U/L 17  --   --   --  28   ALT (SGPT) U/L 21  --   --   --  28     Estimated Creatinine Clearance: 138.1 mL/min (by C-G formula based on SCr of 0.66 mg/dL).      Results from last 7 days   Lab Units 08/29/20  1857   GLUCOSE mg/dL 94     Results from last 7 days   Lab Units 08/29/20 2007   TROPONIN T ng/mL <0.010         Results from last 7 days   Lab Units 08/29/20  0541   TSH uIU/mL 0.984     Results from last 7 days   Lab Units 08/30/20  0611   MAGNESIUM mg/dL 2.2           Invalid input(s): LDLCALC  Results from last 7 days   Lab Units 09/04/20  0638 09/02/20  0619 08/31/20  1816 08/31/20  0707 08/30/20  0611  08/29/20  0541   WBC 10*3/mm3 3.02* 3.39* 4.32 4.09 4.80  --  4.11   HEMOGLOBIN g/dL 11.3* 12.1 12.9 12.7 12.9  --  12.8   HEMATOCRIT % 34.3 34.5 37.0 37.5 38.1  --  38.4   PLATELETS 10*3/mm3 264 266 287 295 290  --  303   MCV fL 93.5 87.8 88.7 90.4 91.4  --  90.8   MCH pg 30.8 30.8 30.9 30.6 30.9  --  30.3   MCHC g/dL 32.9 35.1 34.9 33.9 33.9  --  33.3   RDW % 12.4 11.8* 12.4 12.3 12.3  --  12.2*   RDW-SD fl 43.1 38.3 40.2 40.2 41.3  --  40.5   MPV fL 9.9 10.0 9.7 10.2 9.9  --  10.0   NEUTROPHIL % %  --   --  38.0* 42.8 42.0*   < >  --    LYMPHOCYTE % %  --   --  47.7* 40.8 42.3   < >  --    MONOCYTES % %  --   --  9.5 10.5 11.0   < >  --    EOSINOPHIL % %  --   --  3.7 4.2 3.5   < >  --    BASOPHIL % %  --   --  0.9 1.5 1.0   < >  --    IMM GRAN % %  --   --  0.2 0.2 0.2   < >  --    NEUTROS ABS 10*3/mm3  --   --  1.64* 1.75 2.01   < >  --    LYMPHS ABS 10*3/mm3  --   --  2.06 1.67 2.03   < >  --    MONOS ABS 10*3/mm3  --   --  0.41 0.43 0.53   < >  --    EOS ABS 10*3/mm3  --   --  0.16 0.17 0.17   < >  --    BASOS ABS 10*3/mm3  --   --  0.04 0.06 0.05   < >  --    IMMATURE GRANS (ABS) 10*3/mm3  --   --  0.01 0.01 0.01   < >  --    NRBC /100 WBC  --   --  0.0 0.0 0.0   < >  --     < > = values in this interval not displayed.                                 Results from last 7 days   Lab  Units 08/30/20  0909   NITRITE UA  Negative           Imaging:  Imaging Results (Last 24 Hours)     ** No results found for the last 24 hours. **          I reviewed the patient's new clinical results and medications.         Medication Review:   Scheduled Meds:    budesonide-formoterol 2 puff Inhalation BID - RT   cetirizine 10 mg Oral Daily   gabapentin 300 mg Oral TID   immune globulin (human) 20 g Intravenous Q24H   And      immune globulin (human) 10 g Intravenous Q24H   magnesium hydroxide 10 mL Oral Daily   pantoprazole 40 mg Oral Q AM   Pharmacy to enter IVIG order 400 mg/kg Intravenous Daily   polyethylene glycol 17 g Oral Daily   sucralfate 1 g Oral 4x Daily AC & at Bedtime   temazepam 15 mg Oral Once   tiotropium bromide monohydrate 2 puff Inhalation Daily - RT     Continuous Infusions:    hold 1 each     PRN Meds:.•  acetaminophen  •  aluminum-magnesium hydroxide-simethicone  •  bisacodyl  •  bisacodyl  •  diphenhydrAMINE  •  docusate sodium  •  hold  •  HYDROcodone-acetaminophen  •  hydrocortisone sodium succinate  •  metoclopramide  •  Morphine  •  ondansetron **OR** ondansetron  •  potassium chloride  •  potassium chloride  •  sodium chloride  •  temazepam    Problem List:     Active Hospital Problems    Diagnosis  POA   • **Paresthesia [R20.2]  Yes   • Dysphagia [R13.10]  Unknown   • Tachycardia [R00.0]  Unknown   • Environmental allergies [Z91.09]  Unknown   • Uterine fibroid [D25.9]  Unknown   • Abnormal CT of the abdomen [R93.5]  Unknown   • Constipation [K59.00]  Unknown   • Abdominal pain [R10.9]  Unknown   • Paresthesia and pain of both upper extremities [R20.2, M79.601, M79.602]  Yes   • 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        Assessment and Plan:     45-year-old female presenting with paresthesias      Paresthesia and pain of both upper extremities/ GBS (Guillain Scheller syndrome) (CMS/HCC)  -LP with high protein consistent with GBS as suspected by neurology. CMV IgG and IgM elevated and PCR positive. HIV ab negative. WNV c/w prior infection not acute.   -Monitor EBV PCR  -PT/OT/ ST following.   -IVIG (plan 5 days- last dose tonight) and Neurontin per neurology- patient would like to consider weaning neurontin- defer to neuro  -Avoid narcotics.  -appreciate ID input- await input with CMV PCR now positive    Asthma/ environmental allergies  -Continue bronchodilator Symbicort (replace home Breo), Spiriva and as needed albuterol  -montelukast on hold given possible guillian barre connection      Uterine fibroid  -noted on CT abdomen. Recent MRI lumbar spine noted one up to 2.5 cm suggestive of possible leiomyoma. No significant bleeding.  - Recommend outpatient follow with uterine ultrasound with OBGYN.       Abnormal CT of the abdomen/ abdominal pain/constipation  -appendolith without appendicitis noted.   - appreciate general surgery input. No surgical intervention required.   -diet as tolerated.   -continue bowel regimen, protonix and aspiration precautions.   -on PRN reglan per neuro  -continue protonix. Add carafate. Ask GI to eval for possible gastric ulcer as poss complication from CMV    Dysphagia  -appreciate ST input- no sign of aspiration.   -encouraged small meals with wash after eating. If symptoms worsen will consider GI consult as she previously had esophageal dilation.    VTE prophylaxis: SCDs  CODE status: full code  Disposition: TBD- likely dc tomorrow since IVIG dose given very late and await GI eval. CCP to help enroll in outpatient PT/OT    I discussed the patients findings and my recommendations with patient, family and nursing staff.    I wore full protective equipment throughout this patient encounter including a  face mask and eye shield. Hand hygiene was performed upon entering patients room and when leaving the room.      CHANDRAKANT Manuel  09/04/20

## 2020-09-04 NOTE — PROGRESS NOTES
LOS: 6 days     Chief Complaint:  Follow-up GBS    Interval History:  No fever. Received 4th dose of IVIg last night. Says her symptoms are improved but not resolved.     ROS: no CP or rash    Vital Signs  Temp:  [98 °F (36.7 °C)-98.9 °F (37.2 °C)] 98.4 °F (36.9 °C)  Heart Rate:  [] 90  Resp:  [16-18] 16  BP: (108-120)/(68-85) 114/78    Physical Exam:  General: awake, alert, NAD, very nice  Eyes:  no scleral icterus  Cardiovascular: NR  Respiratory: normal work of breathing on ambient air  : no Castanon catheter present  Neurological: Alert and oriented x 3  Psychiatric: Normal mood and affect     Meds:    Current Facility-Administered Medications:   •  acetaminophen (TYLENOL) tablet 650 mg, 650 mg, Oral, Q6H PRN, Linh Neely APRN, 650 mg at 09/03/20 0601  •  aluminum-magnesium hydroxide-simethicone (MAALOX MAX) 400-400-40 MG/5ML suspension 15 mL, 15 mL, Oral, Q6H PRN, Eduardo Treviño APRN, 15 mL at 09/04/20 0235  •  bisacodyl (DULCOLAX) EC tablet 5 mg, 5 mg, Oral, Daily PRN, Eduardo Treviño APRN  •  bisacodyl (DULCOLAX) suppository 10 mg, 10 mg, Rectal, Daily PRN, Eduardo Treviño APRN, 10 mg at 08/30/20 2051  •  budesonide-formoterol (SYMBICORT) 80-4.5 MCG/ACT inhaler 2 puff, 2 puff, Inhalation, BID - RT, Nolan Weaver MD, 2 puff at 09/03/20 2054  •  cetirizine (zyrTEC) tablet 10 mg, 10 mg, Oral, Daily, Holly Andrea APRN, 10 mg at 09/03/20 0828  •  diphenhydrAMINE (BENADRYL) injection 50 mg, 50 mg, Intravenous, Once PRN, Naresh Thurston MD  •  docusate sodium (COLACE) capsule 100 mg, 100 mg, Oral, BID PRN, Holly Andrea APRN  •  gabapentin (NEURONTIN) capsule 300 mg, 300 mg, Oral, TID, Gómez Nichols MD, 300 mg at 09/03/20 2127  •  Hold medication, 1 each, Does not apply, Continuous PRN, Gómez Nichols MD  •  HYDROcodone-acetaminophen (NORCO) 5-325 MG per tablet 1 tablet, 1 tablet, Oral, Q6H PRN, Holly Andrea APRN  •  hydrocortisone sodium succinate (Solu-CORTEF) injection  100 mg, 100 mg, Intravenous, Once PRN, Naresh Thurston MD  •  immune globulin (human) (GAMUNEX-C) infusion 20 g, 20 g, Intravenous, Q24H, Stopped at 09/04/20 0459 **AND** immune globulin (human) (GAMUNEX-C) infusion 10 g, 10 g, Intravenous, Q24H, Naresh Thurston MD, Stopped at 09/04/20 0145  •  magnesium hydroxide (MILK OF MAGNESIA) suspension 2400 mg/10mL 10 mL, 10 mL, Oral, Daily, Rose Mary Smith MD, 10 mL at 09/03/20 0828  •  metoclopramide (REGLAN) tablet 5 mg, 5 mg, Oral, TID PRN, Naresh Thurston MD, 5 mg at 09/03/20 2128  •  morphine injection 4 mg, 4 mg, Intravenous, Q4H PRN, Nolan Weaver MD, 4 mg at 08/30/20 1903  •  ondansetron (ZOFRAN) tablet 4 mg, 4 mg, Oral, Q6H PRN **OR** ondansetron (ZOFRAN) injection 4 mg, 4 mg, Intravenous, Q6H PRN, Eduardo Treviño APRN, 4 mg at 08/30/20 1213  •  pantoprazole (PROTONIX) EC tablet 40 mg, 40 mg, Oral, Q AM, Nolan Weaver MD, 40 mg at 09/04/20 0506  •  Pharmacy to enter IVIG order 45,000 mg, 400 mg/kg, Intravenous, Daily, Naresh Thurston MD  •  polyethylene glycol (MIRALAX) packet 17 g, 17 g, Oral, Daily, Nolan Weaver MD, 17 g at 09/03/20 0828  •  potassium chloride (KLOR-CON) packet 40 mEq, 40 mEq, Oral, PRN, Nolan Weaver MD  •  potassium chloride (MICRO-K) CR capsule 40 mEq, 40 mEq, Oral, PRN, Nolan Weaver MD, 40 mEq at 08/29/20 1633  •  sodium chloride 0.9 % flush 10 mL, 10 mL, Intravenous, PRN, Eduardo Treviño APRN, 10 mL at 08/31/20 0945  •  sucralfate (CARAFATE) tablet 1 g, 1 g, Oral, 4x Daily AC & at Bedtime, Holly Andrea APRN  •  temazepam (RESTORIL) capsule 15 mg, 15 mg, Oral, Once, Gómez Nichols MD  •  temazepam (RESTORIL) capsule 15 mg, 15 mg, Oral, Nightly PRN, Gómez Nichols MD  •  tiotropium (SPIRIVA RESPIMAT) 2.5 mcg/act aerosol solution inhaler, 2 puff, Inhalation, Daily - RT, Nolan Weaver MD, 2 puff at 09/03/20 0737    LABS:  CBC, CMP, CMV PCR, micro reviewed today  Lab Results   Component Value Date    WBC 3.02 (L)  09/04/2020    HGB 11.3 (L) 09/04/2020    HCT 34.3 09/04/2020    MCV 93.5 09/04/2020     09/04/2020     Lab Results   Component Value Date    GLUCOSE 108 (H) 09/02/2020    BUN 9 09/02/2020    CREATININE 0.66 09/02/2020    EGFRIFAFRI 118 09/02/2020    BCR 13.6 09/02/2020    CO2 24.1 09/02/2020    CALCIUM 8.8 09/02/2020    ALBUMIN 3.70 09/02/2020    AST 17 09/02/2020    ALT 21 09/02/2020    CRP 0.38 08/11/2020     CMV PCR positive but less than 200  EBV PCR pending  HIV Ab negative  CMV IgM and IgG positive  EBV IgM negative and IgG positive  WNV IgM negative and IgG positive  RPR nonreactive     LP results  0 nucleated cells  Pro 71  Glc 72     Microbiology:  8/29 COVID: negative    Assessment/Plan   1. Guillain [place] Syndrome  2. Obesity BMI 39  3. CMV + IgM and IgG     CMV PCR positive but less than 200 copies. One of two scenarios here: 1) she did have CMV a few weeks ago and has essentially cleared the virus on her own as the vast majority of immunocompetent people do or 2) she's had a very small reactivation of the virus in the context of this acute illness. Either no, no anti-CMV therapy is indicated.     GBS management per neurology. D/W primary team.     Thanks for allowing me to be involved in the care of Mrs Villarreal. ID will sign off at this time but please call me at [phone] if any further ID questions or new ID concerns.

## 2020-09-04 NOTE — CONSULTS
[facility]  Initial Inpatient Consult Note    Referring Provider: CHANDRAKANT Andrea    Reason for Consultation: abdominal pain    Subjective     History of present illness:    45 y.o. female unknown to our service.  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  She is being treated with IVIG and is having gradual recovery from this regard.  GI consultation is been requested for evaluation of abdominal pain.  General surgery is already seen the patient earlier in this admission for abdominal pain and possible appendicitis they reviewed her CT scan felt that she had an appendicolith and did not require operative intervention.  She is being treated for constipation which is improving.  The patient reports a cramping like sensation that starts in her bilateral flanks and radiates across the front of her abdomen.  This was lasting for hours at a time when she was admitted but has been gradually improving during her hospitalization.  She has occasional gastroesophageal reflux which is a longstanding problem for her.  She is previously followed Dr. Lacey and reports a colonoscopy and EGD in 2014.    Past Medical History:  Past Medical History:   Diagnosis Date   • Acid reflux    • Asthma    • GERD (gastroesophageal reflux disease)      Past Surgical History:  Past Surgical History:   Procedure Laterality Date   • ENDOSCOPY N/A 2014   • ESOPHAGEAL DILATATION N/A 2014   • TUBAL COAGULATION LAPAROSCOPIC Bilateral 7/15/2019    Procedure: LAPAROSCOPIC BILATERAL TUBAL LIGATION WITH FILSHIE CLIPS;  Surgeon: Jorgito Hallman MD;  [facility]: [facility];  Service: Obstetrics/Gynecology      Social History:   Social History     Tobacco Use   • Smoking status: Never Smoker   • Smokeless tobacco: Never Used   Substance Use Topics   • Alcohol use: No      Family History:  Family History   Problem Relation Age of Onset   • No Known Problems Mother    • No Known Problems  Father    • Malig Hyperthermia Neg Hx        Home Meds:  Medications Prior to Admission   Medication Sig Dispense Refill Last Dose   • BREO ELLIPTA 200-25 MCG/INH inhaler Inhale 1 puff Every Morning. 2 inhaler 3 Taking   • Calcium Carbonate Antacid (SLADE-SELTZER ANTACID PO)    Taking   • cetirizine (zyrTEC) 5 MG tablet Take 5 mg by mouth Every Night.   8/27/2020   • EPIPEN 2-ANYI 0.3 MG/0.3ML solution auto-injector injection Inject 0.3 mL into the appropriate muscle as directed by prescriber Take As Directed. TAKES ALLERGY SHOTS 1 each 2 Taking   • gabapentin (NEURONTIN) 300 MG capsule 1 p.o. nightly 90 capsule 0 8/28/2020   • montelukast (SINGULAIR) 10 MG tablet Take 10 mg by mouth Every Night.   8/27/2020   • pantoprazole (PROTONIX) 40 MG EC tablet TAKE 1 TABLET DAILY (NEED APPOINTMENT IN JULY) 90 tablet 3 8/28/2020   • SPIRIVA RESPIMAT 1.25 MCG/ACT aerosol solution Inhale 2 puffs Every Morning.   Taking   • VENTOLIN  (90 Base) MCG/ACT inhaler INHALE 2 PUFFS BY MOUTH EVERY FOUR HOURS AS NEEDED FOR WHEEZING 18 g 0 Taking     Current Meds:     budesonide-formoterol 2 puff Inhalation BID - RT   cetirizine 10 mg Oral Daily   gabapentin 300 mg Oral TID   immune globulin (human) 20 g Intravenous Q24H   And      immune globulin (human) 10 g Intravenous Q24H   magnesium hydroxide 10 mL Oral Daily   pantoprazole 40 mg Oral Q AM   Pharmacy to enter IVIG order 400 mg/kg Intravenous Daily   polyethylene glycol 17 g Oral Daily   sucralfate 1 g Oral 4x Daily AC & at Bedtime   temazepam 15 mg Oral Once   tiotropium bromide monohydrate 2 puff Inhalation Daily - RT     Allergies:  No Known Allergies  Review of Systems  All systems were reviewed and negative except for:  Gastrointestinal: positive for  constipation     Objective     Vital Signs  Temp:  [98 °F (36.7 °C)-98.9 °F (37.2 °C)] 98.1 °F (36.7 °C)  Heart Rate:  [] 96  Resp:  [16-18] 16  BP: (108-120)/(68-85) 119/76  Physical Exam:  General Appearance:     Alert,  cooperative, in no acute distress   Head:    Normocephalic, without obvious abnormality, atraumatic   Eyes:            Lids and lashes normal, conjunctivae and sclerae normal, no icterus   Throat:   No oral lesions, no thrush, oral mucosa moist   Neck:   No adenopathy, supple, trachea midline, no thyromegaly, no carotid bruit, no JVD   Lungs:     Clear to auscultation,respirations regular, even and            unlabored    Heart:    Regular rhythm and normal rate, normal S1 and S2, no        murmur, no gallop, no rub, no click   Chest Wall:    No abnormalities observed   Abdomen:     Normal bowel sounds, no masses, no organomegaly, soft     nontender, nondistended, no guarding, no rebound                 tenderness   Rectal:     Deferred   Extremities:   no edema, no cyanosis, no redness   Skin:   No bleeding, bruising or rash   Lymph nodes:   No palpable adenopathy   Psychiatric:  Judgement and insight: normal   Orientation to person place and time: normal   Mood and affect: normal   Results Review:   I reviewed the patient's new clinical results.  I reviewed the patient's new imaging results and agree with the interpretation.    Results from last 7 days   Lab Units 09/04/20  0638 09/02/20  0619 08/31/20  1816   WBC 10*3/mm3 3.02* 3.39* 4.32   HEMOGLOBIN g/dL 11.3* 12.1 12.9   HEMATOCRIT % 34.3 34.5 37.0   PLATELETS 10*3/mm3 264 266 287     Results from last 7 days   Lab Units 09/02/20  0619 08/31/20  0707 08/30/20  0611  08/29/20  0541   SODIUM mmol/L 137 137 137  --  139   POTASSIUM mmol/L 3.6 4.2 4.1   < > 3.5   CHLORIDE mmol/L 106 104 107  --  107   CO2 mmol/L 24.1 22.3 22.6  --  21.4*   BUN mg/dL 9 9 8  --  7   CREATININE mg/dL 0.66 0.81 0.65  --  0.66   CALCIUM mg/dL 8.8 9.5 9.2  --  9.1   BILIRUBIN mg/dL 0.9  --   --   --  0.8   ALK PHOS U/L 50  --   --   --  53   ALT (SGPT) U/L 21  --   --   --  28   AST (SGOT) U/L 17  --   --   --  28   GLUCOSE mg/dL 108* 101* 99  --  99    < > = values in this interval not  displayed.         No results found for: LIPASE    Radiology:  IR Lumbar Puncture Diag/Thera   Final Result   Successful lumbar puncture.       This report was finalized on 8/31/2020 3:21 PM by Dr. Sander Ortiz M.D.          CT Abdomen Pelvis Without Contrast   Final Result      XR Abdomen KUB   Final Result   Mild to moderate stool throughout the colon. No evidence to   suggest bowel obstruction.       This report was finalized on 8/29/2020 7:52 PM by Dr. Edilberto Bergeron M.D.              Assessment/Plan   Assessment:   1. GBS  2. ? Resolving CMV infection  3. Constipation  4. GERD    Plan:   I would continue with current medical management of the patient's vague GI complaints.  Some of this may be related to an element of constipation some of this could also be sequelae from her GBS.  I do not see a clear indication for inpatient endoscopic evaluation.  I did review her CT scan which shows no obvious gastrointestinal abnormalities.  I would be happy to see the patient in the office for follow-up upon discharge we can discuss possibly performing outpatient EGD and updating her colonoscopy as well.    I discussed the patients findings and my recommendations with patient.         Beau Cain M.D.  [facility]  [address] [place] [address]  Office: [phone]

## 2020-09-04 NOTE — PROGRESS NOTES
Continued Stay Note  [facility]     Patient Name: Vianney Villarreal  MRN: [id number]  Today's Date: 9/4/2020    Admit Date: 8/29/2020    Discharge Plan     Row Name 09/04/20 1126       Plan    Plan  Home with continued outpatient PT    Patient/Family in Agreement with Plan  yes    Plan Comments  [facility] met with pt who confirms plan to return home at d/c and will resume PT/OT via [facility] which she had been participating in prior to admission. Pt states her daughter will transport her home at d/c and denies additional needs. Elke Pollard LCSW        Discharge Codes    No documentation.             Darlene Pollard LCSW

## 2020-09-05 ENCOUNTER — READMISSION MANAGEMENT (OUTPATIENT)
Dept: CALL CENTER | Facility: HOSPITAL | Age: 45
End: 2020-09-05

## 2020-09-05 VITALS
HEIGHT: 66 IN | RESPIRATION RATE: 18 BRPM | TEMPERATURE: 98 F | BODY MASS INDEX: 38.89 KG/M2 | WEIGHT: 242 LBS | SYSTOLIC BLOOD PRESSURE: 107 MMHG | OXYGEN SATURATION: 100 % | HEART RATE: 77 BPM | DIASTOLIC BLOOD PRESSURE: 71 MMHG

## 2020-09-05 PROBLEM — R10.9 ABDOMINAL PAIN: Status: RESOLVED | Noted: 2020-08-31 | Resolved: 2020-09-05

## 2020-09-05 PROBLEM — B25.9 CYTOMEGALOVIRAL DISEASE: Status: ACTIVE | Noted: 2020-09-05

## 2020-09-05 PROBLEM — R13.10 DYSPHAGIA: Status: RESOLVED | Noted: 2020-09-01 | Resolved: 2020-09-05

## 2020-09-05 PROBLEM — R00.0 TACHYCARDIA: Status: RESOLVED | Noted: 2020-09-01 | Resolved: 2020-09-05

## 2020-09-05 PROCEDURE — 25010000002 IMMUNE GLOBULIN (HUMAN) 20 GM/200ML SOLUTION: Performed by: PSYCHIATRY & NEUROLOGY

## 2020-09-05 PROCEDURE — 99233 SBSQ HOSP IP/OBS HIGH 50: CPT | Performed by: NURSE PRACTITIONER

## 2020-09-05 RX ORDER — MONTELUKAST SODIUM 10 MG/1
10 TABLET ORAL NIGHTLY
Start: 2020-09-05 | End: 2022-02-09

## 2020-09-05 RX ORDER — POLYETHYLENE GLYCOL 3350 17 G/17G
17 POWDER, FOR SOLUTION ORAL DAILY
Start: 2020-09-06 | End: 2022-02-09

## 2020-09-05 RX ORDER — PSEUDOEPHEDRINE HCL 30 MG
100 TABLET ORAL 2 TIMES DAILY PRN
Start: 2020-09-05 | End: 2022-02-09

## 2020-09-05 RX ORDER — SUCRALFATE 1 G/1
1 TABLET ORAL
Qty: 40 TABLET | Refills: 0 | Status: SHIPPED | OUTPATIENT
Start: 2020-09-05 | End: 2020-09-15

## 2020-09-05 RX ORDER — GABAPENTIN 300 MG/1
300 CAPSULE ORAL 3 TIMES DAILY
Qty: 27 CAPSULE | Refills: 0 | Status: SHIPPED | OUTPATIENT
Start: 2020-09-05 | End: 2020-09-08 | Stop reason: SINTOL

## 2020-09-05 RX ADMIN — SUCRALFATE 1 G: 1 TABLET ORAL at 11:23

## 2020-09-05 RX ADMIN — PANTOPRAZOLE SODIUM 40 MG: 40 TABLET, DELAYED RELEASE ORAL at 05:20

## 2020-09-05 RX ADMIN — POLYETHYLENE GLYCOL 3350 17 G: 17 POWDER, FOR SOLUTION ORAL at 08:21

## 2020-09-05 RX ADMIN — CETIRIZINE HYDROCHLORIDE 10 MG: 10 TABLET ORAL at 08:21

## 2020-09-05 RX ADMIN — METOCLOPRAMIDE 5 MG: 5 TABLET ORAL at 03:28

## 2020-09-05 RX ADMIN — SUCRALFATE 1 G: 1 TABLET ORAL at 08:21

## 2020-09-05 RX ADMIN — ACETAMINOPHEN 650 MG: 325 TABLET, FILM COATED ORAL at 03:28

## 2020-09-05 RX ADMIN — GABAPENTIN 300 MG: 300 CAPSULE ORAL at 08:21

## 2020-09-05 RX ADMIN — MAGNESIUM HYDROXIDE 10 ML: 2400 SUSPENSION ORAL at 08:21

## 2020-09-05 RX ADMIN — IMMUNE GLOBULIN (HUMAN) 20 G: 10 INJECTION INTRAVENOUS; SUBCUTANEOUS at 02:12

## 2020-09-05 NOTE — PROGRESS NOTES
"DOS: 2020  NAME: Vianney Villarreal   : 1975  PCP: Gurwinder Almazan MD  CC: upper and lower extremity weakness with associated numbness and tingling    Subjective: No events overnight.  Patient reports persistent mild numbness and tingling of bilateral feet and hands; improved since admission.  She has completed 5 doses of IVIG and is tolerated without any difficulty and/or side effects.    No family at bedside.       Objective:  Vital signs: /71 (BP Location: Right arm, Patient Position: Sitting)   Pulse 77   Temp 98 °F (36.7 °C) (Oral)   Resp 18   Ht 167.6 cm (65.98\")   Wt 110 kg (242 lb)   LMP  (LMP Unknown)   SpO2 100%   Breastfeeding No   BMI 39.08 kg/m²       HEENT: Normocephalic, atraumatic   COR: RRR  Resp: Even and unlabored  Extremities: Equal pulses, nondistal embolization    Neurological:   MS: AO. Language normal. No neglect. Higher integrative function normal  CN: II-XII normal  Motor: 4-/5, normal tone  Reflexes: Areflexia lower extremities  Sensory: Intact  Coordination: Normal (finger-to-nose)    Laboratory results:  Lab Results   Component Value Date    GLUCOSE 108 (H) 2020    CALCIUM 8.8 2020     2020    K 3.6 2020    CO2 24.1 2020     2020    BUN 9 2020    CREATININE 0.66 2020    EGFRIFAFRI 118 2020    BCR 13.6 2020    ANIONGAP 6.9 2020     Lab Results   Component Value Date    WBC 3.02 (L) 2020    HGB 11.3 (L) 2020    HCT 34.3 2020    MCV 93.5 2020     2020     Lab Results   Component Value Date    CHOL 199 2020    CHOL 160 2018     Lab Results   Component Value Date    HDL 61 (H) 2020    HDL 66 (H) 2018     Lab Results   Component Value Date     (H) 2020    LDL 79 2018     Lab Results   Component Value Date    TRIG 144 2020    TRIG 76 2018       Review and interpretation of imaging:  STAT MRI EXAMINATION " OF THE BRAIN WITHOUT CONTRAST AND MRI EXAMINATION  OF THE LUMBAR SPINE WITHOUT CONTRAST     HISTORY: Headache, back pain for 3-4 weeks.     COMPARISON: None.     MRI EXAMINATION OF THE BRAIN WITHOUT CONTRAST:     TECHNIQUE: A MRI examination of brain was performed utilizing sagittal  T1, axial diffusion, T1, T2, T2 FLAIR and gradient-echo T2 imaging.     FINDINGS: There is no evidence of restricted diffusion to suggest acute  infarction. The brain ventricles are symmetrical. There is expected  flow-void in the basilar artery and in the distal aspect of the internal  carotid arteries bilaterally on the axial T2 sequence. In the area of  increased signal intensity is noted related to the frontal recess at the  midline on the T2 sequence measuring approximately 9 x 7 mm in  transverse dimension. This measures approximately 1.5 cm in craniocaudal  dimension and may represent a mucocele. There is no evidence of  cerebellar tonsillar ectopia.     IMPRESSION:  No evidence of acute infarction. Areas of T2 hyperintensity  involving the frontal sinus of midline which may represent a mucus  retention cyst or mucocele. There is no evidence of an air-fluid level  within the paranasal sinuses to suggest acute sinusitis. A small mucus  retention cyst involving the right maxillary sinus inferiorly is also  present.     MRI EXAMINATION OF THE LUMBAR SPINE WITHOUT CONTRAST:     There is mild dextroscoliosis with the apex level of L4. The conus is at  L1-L2 which is slightly low lying. There is a grade 1 retrolisthesis of  L5 upon S1 estimated to be approximately 2 mm.     L1-L2: There is no evidence of disc bulge or herniation.     L2-L3: Mild facet degenerative disease is present bilaterally.     L3-L4: Mild to moderate facet degenerative disease is present  bilaterally. There is no evidence of disc bulge or herniation.     L4-L5: Mild-to-moderate facet degenerative disease is present  bilaterally. There is no evidence of disc bulge  or herniation.     L5-S1: Mild-to-moderate facet degenerative disease is present  bilaterally. There is no evidence of disc bulge or herniation.     The uterus is only partially visualized but there is likely a leiomyoma  involving the uterus measuring approximately 2.5 cm in size involving  the lower uterine segment. A pelvic ultrasound is suggested.     IMPRESSION:   1. Mild degenerative disease involving the lumbar spine as described  with no evidence of disc herniation. The conus is slightly low lying.  2. Partial visualization of an area of signal loss involving the lower  uterine segment on the sagittal T2 sequence measuring approximately 2.5  cm in size. This likely represents a leiomyoma. Further evaluation with  a pelvic ultrasound is recommended.     The above information was called to and discussed with Dr. Mosqueda who is  covering for Dr. Almazan on 08/26/2020 at 1703 hours.     This report was finalized on 8/27/2020 11:37 AM by Dr. Harlan Epperson M.D.  STAT MRI EXAMINATION OF THE BRAIN WITHOUT CONTRAST AND MRI EXAMINATION  OF THE LUMBAR SPINE WITHOUT CONTRAST     HISTORY: Headache, back pain for 3-4 weeks.     COMPARISON: None.     MRI EXAMINATION OF THE BRAIN WITHOUT CONTRAST:     TECHNIQUE: A MRI examination of brain was performed utilizing sagittal  T1, axial diffusion, T1, T2, T2 FLAIR and gradient-echo T2 imaging.     FINDINGS: There is no evidence of restricted diffusion to suggest acute  infarction. The brain ventricles are symmetrical. There is expected  flow-void in the basilar artery and in the distal aspect of the internal  carotid arteries bilaterally on the axial T2 sequence. In the area of  increased signal intensity is noted related to the frontal recess at the  midline on the T2 sequence measuring approximately 9 x 7 mm in  transverse dimension. This measures approximately 1.5 cm in craniocaudal  dimension and may represent a mucocele. There is no evidence of  cerebellar tonsillar  ectopia.     IMPRESSION:  No evidence of acute infarction. Areas of T2 hyperintensity  involving the frontal sinus of midline which may represent a mucus  retention cyst or mucocele. There is no evidence of an air-fluid level  within the paranasal sinuses to suggest acute sinusitis. A small mucus  retention cyst involving the right maxillary sinus inferiorly is also  present.     MRI EXAMINATION OF THE LUMBAR SPINE WITHOUT CONTRAST:     There is mild dextroscoliosis with the apex level of L4. The conus is at  L1-L2 which is slightly low lying. There is a grade 1 retrolisthesis of  L5 upon S1 estimated to be approximately 2 mm.     L1-L2: There is no evidence of disc bulge or herniation.     L2-L3: Mild facet degenerative disease is present bilaterally.     L3-L4: Mild to moderate facet degenerative disease is present  bilaterally. There is no evidence of disc bulge or herniation.     L4-L5: Mild-to-moderate facet degenerative disease is present  bilaterally. There is no evidence of disc bulge or herniation.     L5-S1: Mild-to-moderate facet degenerative disease is present  bilaterally. There is no evidence of disc bulge or herniation.     The uterus is only partially visualized but there is likely a leiomyoma  involving the uterus measuring approximately 2.5 cm in size involving  the lower uterine segment. A pelvic ultrasound is suggested.     IMPRESSION:   1. Mild degenerative disease involving the lumbar spine as described  with no evidence of disc herniation. The conus is slightly low lying.  2. Partial visualization of an area of signal loss involving the lower  uterine segment on the sagittal T2 sequence measuring approximately 2.5  cm in size. This likely represents a leiomyoma. Further evaluation with  a pelvic ultrasound is recommended.     The above information was called to and discussed with Dr. Mosqueda who is  covering for Dr. Almazan on 08/26/2020 at 1703 hours.     This report was finalized on 8/27/2020  11:37 AM by Dr. Harlan Epperson M.D.     STAT MRI EXAMINATION OF THE BRAIN WITHOUT CONTRAST AND MRI EXAMINATION  OF THE LUMBAR SPINE WITHOUT CONTRAST     HISTORY: Headache, back pain for 3-4 weeks.     COMPARISON: None.     MRI EXAMINATION OF THE BRAIN WITHOUT CONTRAST:     TECHNIQUE: A MRI examination of brain was performed utilizing sagittal  T1, axial diffusion, T1, T2, T2 FLAIR and gradient-echo T2 imaging.     FINDINGS: There is no evidence of restricted diffusion to suggest acute  infarction. The brain ventricles are symmetrical. There is expected  flow-void in the basilar artery and in the distal aspect of the internal  carotid arteries bilaterally on the axial T2 sequence. In the area of  increased signal intensity is noted related to the frontal recess at the  midline on the T2 sequence measuring approximately 9 x 7 mm in  transverse dimension. This measures approximately 1.5 cm in craniocaudal  dimension and may represent a mucocele. There is no evidence of  cerebellar tonsillar ectopia.     IMPRESSION:  No evidence of acute infarction. Areas of T2 hyperintensity  involving the frontal sinus of midline which may represent a mucus  retention cyst or mucocele. There is no evidence of an air-fluid level  within the paranasal sinuses to suggest acute sinusitis. A small mucus  retention cyst involving the right maxillary sinus inferiorly is also  present.     MRI EXAMINATION OF THE LUMBAR SPINE WITHOUT CONTRAST:     There is mild dextroscoliosis with the apex level of L4. The conus is at  L1-L2 which is slightly low lying. There is a grade 1 retrolisthesis of  L5 upon S1 estimated to be approximately 2 mm.     L1-L2: There is no evidence of disc bulge or herniation.     L2-L3: Mild facet degenerative disease is present bilaterally.     L3-L4: Mild to moderate facet degenerative disease is present  bilaterally. There is no evidence of disc bulge or herniation.     L4-L5: Mild-to-moderate facet degenerative  disease is present  bilaterally. There is no evidence of disc bulge or herniation.     L5-S1: Mild-to-moderate facet degenerative disease is present  bilaterally. There is no evidence of disc bulge or herniation.     The uterus is only partially visualized but there is likely a leiomyoma  involving the uterus measuring approximately 2.5 cm in size involving  the lower uterine segment. A pelvic ultrasound is suggested.     IMPRESSION:   1. Mild degenerative disease involving the lumbar spine as described  with no evidence of disc herniation. The conus is slightly low lying.  2. Partial visualization of an area of signal loss involving the lower  uterine segment on the sagittal T2 sequence measuring approximately 2.5  cm in size. This likely represents a leiomyoma. Further evaluation with  a pelvic ultrasound is recommended.     The above information was called to and discussed with Dr. Mosqueda who is  covering for Dr. Almazan on 08/26/2020 at 1703 hours.     This report was finalized on 8/27/2020 11:37 AM by Dr. Harlan Epperson M.D.     CT ABDOMEN PELVIS WO CONTRAST-     Radiation dose reduction techniques were utilized, including automated  exposure control and exposure modulation based on body size.     CLINICAL INFORMATION: Unspecified abdominal pain 45-year-old female.     FINDINGS: The lung bases have a satisfactory appearance. The liver,  gallbladder, pancreas, spleen, adrenal glands and kidneys have a normal  appearance allowing for the lack of intravenous contrast. No biliary  duct dilatation or obstructive uropathy. Urinary bladder is collapsed.     Uterus is anteverted and is somewhat bulbous in contour. Bilateral tubal  ligation clips in position. Both ovaries are symmetric and normal in  size. No free fluid within the pelvis.     The gastric contour is normal. There is a tiny appendicolith  demonstrated with an otherwise normal-appearing appendix, no  appendicitis. Caliber of the large and small bowel within  "normal limits.  No bowel wall thickening nor induration of the mesentery suggestive of  an inflammatory process. There is a small ventral hernia containing only  mesenteric fat, no edema at this location.     CONCLUSION:  1. Small ventral hernia containing only mesenteric fat.  2. Appendicolith, however, the appendix is otherwise normal in  appearance without appendicitis.  3. Tubal ligation clips in position.  4. The uterus is somewhat bulbous in contour, there could be underlying  fibroids.        This report was finalized on 8/31/2020 10:11 AM by Dr. Luis Robin M.D.  Supine KUB     HISTORY: Abdominal pain, discomfort, belching     COMPARISON: None     FINDINGS: Air-filled redundant sigmoid colon is evident. There is no  bowel dilatation to suggest obstruction. Mild to moderate stool is  present throughout the colon. Lung bases appear clear. There is a  phlebolith the right lower pelvis. Cardiac monitoring leads are noted.     IMPRESSION:  Mild to moderate stool throughout the colon. No evidence to  suggest bowel obstruction.     This report was finalized on 8/29/2020 7:52 PM by Dr. Edilberto Bergeron M.D.    Impression: Is a 45-year-old female with history of GERD and asthma who presented to [facility] on 8/29 due to complaint of bilateral upper extremity and lower extremity numbness and tingling.  Chart review reflects that patient reportedly had paresthesias which started approximately 3 to 4 weeks prior to arrival with associated stomach spasm that radiated into her back and she had notable body \"heaviness\" primarily when ambulating.  Numbness initially started in her lower extremities and spread in a ascending pattern.  She was initially seen at [facility] on 826 and had MRI of the brain which was unremarkable and was discharged home after 24-hour stay.  She returned with same symptoms along with shortness of breath and was transferred to here for further work-up and evaluation.  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  Work-up to date below.    Neurologically, patient remains stable and without side effects of IVIG at this point.  Keep planned follow-up with Dr. Samir Arrington.  Recommendations below.No further neurology workup indicated. We will sign off and see again upon request.      Work up to date:  Labs: TSH 0.94, B12 842, mag 2.2, U/A negative, CMV IgG 8.40, CMV IgM 80.8, EBV VCA IgG > 600, EBV VCA IgM <36.0, IgA 210, IgG 1253, IgM 176, RPR nonreactive, HIV 1/HIV-2 antibody non-reactive, 8/11 STEF negative, 8/11 rheumatoid factor <10.0, LP T NC 1, glucose 72, protein 71.0, colorless and clear, IgG index 0.6, IgG synthesis rate 6.1, IgG CSF 8.1, IgG/ALB ratio 0.17, myelin basic protein 2.4, no oligoclonal bands; West Nile IgG positive, West Nile IgM negative, CMV PCR positive, EBV PCR pending  8/26 MRI Brain WO: No evidence of restricted diffusion to suggest acute infarction, area of T2 hyperintensity involving the frontal sinus suggestive of a retention cyst or mucocele.  8/26 MRI L-spine WO: Mild degenerative disease, area of signal loss involving the lower uterine segment on the sagittal T2 sequence radiology recommended a pelvic ultrasound.      Differential:  1.  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  Positive CMV antibody; followed by infectious disease this admission    Plan:  Gabapentin 300 3 times daily for GBS discomfort  Follow-up with Dr. Arrington as planned; return to ER for worsening interchanging symptoms previously discussed    Case reviewed with attending Dr. Mccain and he agrees with plan above.  Discharge plan discussed with hospitalist CHANDRAKANT Nix

## 2020-09-05 NOTE — OUTREACH NOTE
Prep Survey      Responses   [facility] [place] patient discharged from?  [place]   Is LACE score < 7 ?  No   Eligibility  [facility]    Date of Admission  08/29/20   Date of Discharge  09/05/20   Discharge Disposition  Home or Self Care   Discharge diagnosis  Parasthesia,  DDD    COVID-19 Test Status  Negative   Does the patient have one of the following disease processes/diagnoses(primary or secondary)?  Other   Does the patient have Home health ordered?  No   Is there a DME ordered?  No   Prep survey completed?  Yes          Shelbi Munoz RN

## 2020-09-05 NOTE — DISCHARGE SUMMARY
Discharge Summary    Patient Name: Vianney Villarreal  Age/Sex: 45 y.o. 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 Please see the admitting history and physical for further details. She was found to have paresthesias of the bilateral extremities suspicious for Guillain Barré syndrome and was admitted to the hospital for further evaluation and treatment.  Neurology, gastroenterology, general surgery and infectious disease were all consulted during this hospitalization.    Patient underwent lumbar puncture that showed elevated protein consistent with possible [place] Barré.  Recent outpatient MRI of brain and L-spine was unremarkable.  She was found to have elevated IgG and IgM for CMV and PCR was positive, but less than 20 not felt to be a candidate for antivirals.  West Nile studies were consistent with history of prior infection. She was treated with 5 days of IVIG and had overall improvement in symptoms, but was still having some occasional paresthesia and heaviness feeling.  She will continue  on higher dose of Neurontin and follow-up with neurology as scheduled outpatient and consider continuing with prior EMG studies.  She will also continue with PT-OT and encouraged to return to the emergency room if symptoms worsen.  EBV PCR was pending at time of discharge.    She was incidentally noted to have uterine fibroid and appendicolith on CT of abdomen.  She was evaluated by general surgery and had no signs of acute infection and no surgical intervention was warranted.  She will need to follow-up with OB/GYN for further evaluation of uterine fibroids.  She was started on MiraLAX and stool softeners and was recommended to continue as outpatient.  She will continue on home Protonix and 10 days of Carafate to help with indigestion.  If symptoms still worse after completing Carafate could consider increasing Protonix to twice daily and following up with GI for possible EGD.    She was evaluated by speech due to complaints of dysphagia, but had no sign of aspiration.  Montelukast was discontinued due to possibility of connection with GBS and patient was instructed to stay off this medication until further follow-up with allergist.  Overall she is stable and ready for discharge home.      Consults:   IP CONSULT TO CASE MANAGEMENT   IP CONSULT TO NEUROLOGY  IP CONSULT TO GENERAL SURGERY  IP CONSULT TO INFECTIOUS DISEASES  IP CONSULT TO GASTROENTEROLOGY  IP CONSULT TO CASE MANAGEMENT     Significant Discharge Diagnostics   Procedures Performed:         Pertinent Lab Results:  Results from last 7 days   Lab Units 09/02/20  0619 08/31/20  0707 08/30/20  0611 08/29/20 2007   SODIUM mmol/L 137 137 137  --    POTASSIUM mmol/L 3.6 4.2 4.1 3.7   CHLORIDE mmol/L 106 104 107  --    CO2 mmol/L 24.1 22.3 22.6  --    BUN mg/dL 9 9 8  --    CREATININE mg/dL 0.66 0.81 0.65  --    GLUCOSE mg/dL 108* 101* 99  --    CALCIUM mg/dL 8.8 9.5 9.2  --    AST (SGOT) U/L 17  --   --   --    ALT (SGPT) U/L 21   --   --   --      Results from last 7 days   Lab Units 08/29/20  2007   TROPONIN T ng/mL <0.010     Results from last 7 days   Lab Units 09/04/20  0638 09/02/20  0619 08/31/20  1816 08/31/20  0707 08/30/20  0611   WBC 10*3/mm3 3.02* 3.39* 4.32 4.09 4.80   HEMOGLOBIN g/dL 11.3* 12.1 12.9 12.7 12.9   HEMATOCRIT % 34.3 34.5 37.0 37.5 38.1   PLATELETS 10*3/mm3 264 266 287 295 290   MCV fL 93.5 87.8 88.7 90.4 91.4   MCH pg 30.8 30.8 30.9 30.6 30.9   MCHC g/dL 32.9 35.1 34.9 33.9 33.9   RDW % 12.4 11.8* 12.4 12.3 12.3   RDW-SD fl 43.1 38.3 40.2 40.2 41.3   MPV fL 9.9 10.0 9.7 10.2 9.9   NEUTROPHIL % %  --   --  38.0* 42.8 42.0*   LYMPHOCYTE % %  --   --  47.7* 40.8 42.3   MONOCYTES % %  --   --  9.5 10.5 11.0   EOSINOPHIL % %  --   --  3.7 4.2 3.5   BASOPHIL % %  --   --  0.9 1.5 1.0   IMM GRAN % %  --   --  0.2 0.2 0.2   NEUTROS ABS 10*3/mm3  --   --  1.64* 1.75 2.01   LYMPHS ABS 10*3/mm3  --   --  2.06 1.67 2.03   MONOS ABS 10*3/mm3  --   --  0.41 0.43 0.53   EOS ABS 10*3/mm3  --   --  0.16 0.17 0.17   BASOS ABS 10*3/mm3  --   --  0.04 0.06 0.05   IMMATURE GRANS (ABS) 10*3/mm3  --   --  0.01 0.01 0.01   NRBC /100 WBC  --   --  0.0 0.0 0.0         Results from last 7 days   Lab Units 08/30/20  0611   MAGNESIUM mg/dL 2.2           Invalid input(s): LDLCALC                              Results from last 7 days   Lab Units 08/30/20  0909   NITRITE UA  Negative               Imaging Results:  Imaging Results (Most Recent)     Procedure Component Value Units Date/Time    IR Lumbar Puncture Diag/Thera [[id number]] Collected:  08/31/20 1518     Updated:  08/31/20 1524    Narrative:       FLUOROSCOPIC GUIDED LUMBAR PUNCTURE      ACCESSION: [id number]     DATE: 8/31/2020 2:15 PM     EXAM REASON:  Guillain [place] Syndrome.      COMPARISON: MRI 08/26/2020     PROCEDURE:   The procedure with its associated risks and benefits was explained to  the patient and signed consent obtained with a witness present.   The patient was placed in a  prone position on the fluoroscopy table. The  skin surface for access to the thecal sac at the level of L3-4 was  identified fluoroscopically and marked. The area was prepped and draped  in the usual sterile fashion. 1% lidocaine was utilized for local  anesthesia.   The tip of a 22-gauge x 7 inch spinal needle was then advanced into the  lumbar thecal sac at level of L3-4 using intermittent fluoroscopic  guidance. Needle tip position was confirmed by noting clear spinal fluid  in the needle hub. A total of 10 mL clear cerebrospinal fluid was then  obtained via gravity drainage and sent to the clinical laboratories for  analysis as requested by the clinical service.    The needle was removed and direct pressure was applied for a few  minutes. A sterile dressing was applied. There were no immediate  complications.  All elements of maximal sterile technique were followed.  1 minute 5 seconds fluoroscopy time, 31.1 mGy, 3 stored images.       Impression:       Successful lumbar puncture.     This report was finalized on 8/31/2020 3:21 PM by Dr. Sander Ortiz M.D.       CT Abdomen Pelvis Without Contrast [[id number]] Collected:  08/31/20 0952     Updated:  08/31/20 1014    Narrative:       CT ABDOMEN PELVIS WO CONTRAST-     Radiation dose reduction techniques were utilized, including automated  exposure control and exposure modulation based on body size.     CLINICAL INFORMATION: Unspecified abdominal pain 45-year-old female.     FINDINGS: The lung bases have a satisfactory appearance. The liver,  gallbladder, pancreas, spleen, adrenal glands and kidneys have a normal  appearance allowing for the lack of intravenous contrast. No biliary  duct dilatation or obstructive uropathy. Urinary bladder is collapsed.     Uterus is anteverted and is somewhat bulbous in contour. Bilateral tubal  ligation clips in position. Both ovaries are symmetric and normal in  size. No free fluid within the pelvis.     The gastric contour is  normal. There is a tiny appendicolith  demonstrated with an otherwise normal-appearing appendix, no  appendicitis. Caliber of the large and small bowel within normal limits.  No bowel wall thickening nor induration of the mesentery suggestive of  an inflammatory process. There is a small ventral hernia containing only  mesenteric fat, no edema at this location.     CONCLUSION:  1. Small ventral hernia containing only mesenteric fat.  2. Appendicolith, however, the appendix is otherwise normal in  appearance without appendicitis.  3. Tubal ligation clips in position.  4. The uterus is somewhat bulbous in contour, there could be underlying  fibroids.        This report was finalized on 8/31/2020 10:11 AM by Dr. Luis Robin M.D.       XR Abdomen KUB [[id number]] Collected:  08/29/20 1951     Updated:  08/29/20 1955    Narrative:       Supine KUB     HISTORY: Abdominal pain, discomfort, belching     COMPARISON: None     FINDINGS: Air-filled redundant sigmoid colon is evident. There is no  bowel dilatation to suggest obstruction. Mild to moderate stool is  present throughout the colon. Lung bases appear clear. There is a  phlebolith the right lower pelvis. Cardiac monitoring leads are noted.       Impression:       Mild to moderate stool throughout the colon. No evidence to  suggest bowel obstruction.     This report was finalized on 8/29/2020 7:52 PM by Dr. Edilberto Bergeron M.D.               Objective:   Temp:  [97.8 °F (36.6 °C)-98.5 °F (36.9 °C)] 98 °F (36.7 °C)  Heart Rate:  [77-96] 77  Resp:  [16-18] 18  BP: (107-119)/(71-85) 107/71   SpO2:  [97 %-100 %] 100 %  on    Device (Oxygen Therapy): room air    Intake/Output Summary (Last 24 hours) at 9/5/2020 1216  Last data filed at 9/5/2020 0100  Gross per 24 hour   Intake 0 ml   Output --   Net 0 ml     Body mass index is 39.08 kg/m².      08/29/20  0100   Weight: 110 kg (242 lb)       Physical Exam   Constitutional: No distress.   HENT:   Head: Atraumatic.   Nose:  Nose normal.   Eyes: Conjunctivae are normal.   Cardiovascular: Normal rate and regular rhythm.   No murmur heard.  Pulmonary/Chest: Effort normal. She has no wheezes. She has no rales.   Diminished bases   Abdominal: Soft. Bowel sounds are normal. She exhibits no distension. There is tenderness (epigastric).   Musculoskeletal: She exhibits no edema.   Neurological: She is alert.   Skin: Skin is warm and dry. She is not diaphoretic.       Discharge Medications and Instructions:     Discharge Medications     Discharge Medications      New Medications      Instructions Start Date   docusate sodium 100 MG capsule   100 mg, Oral, 2 Times Daily PRN      polyethylene glycol 17 g packet  Commonly known as:  MIRALAX   17 g, Oral, Daily   Start Date:  September 6, 2020     sucralfate 1 g tablet  Commonly known as:  CARAFATE   1 g, Oral, 4 Times Daily Before Meals & Nightly         Changes to Medications      Instructions Start Date   gabapentin 300 MG capsule  Commonly known as:  NEURONTIN  What changed:  Another medication with the same name was added. Make sure you understand how and when to take each.   1 p.o. nightly      gabapentin 300 MG capsule  Commonly known as:  NEURONTIN  What changed:  You were already taking a medication with the same name, and this prescription was added. Make sure you understand how and when to take each.   300 mg, Oral, 3 Times Daily      montelukast 10 MG tablet  Commonly known as:  SINGULAIR  What changed:  additional instructions   10 mg, Oral, Nightly, Hold until follow up with allergist         Continue These Medications      Instructions Start Date   SLADE-SELTZER ANTACID PO   No dose, route, or frequency recorded.      Breo Ellipta 200-25 MCG/INH inhaler  Generic drug:  Fluticasone Furoate-Vilanterol   1 puff, Inhalation, Every Morning      cetirizine 5 MG tablet  Commonly known as:  zyrTEC   5 mg, Oral, Nightly      EpiPen 2-Waylon 0.3 MG/0.3ML solution auto-injector injection  Generic  drug:  EPINEPHrine   0.3 mg, Intramuscular, Take As Directed, TAKES ALLERGY SHOTS      pantoprazole 40 MG EC tablet  Commonly known as:  PROTONIX   TAKE 1 TABLET DAILY (NEED APPOINTMENT IN JULY)      Spiriva Respimat 1.25 MCG/ACT aerosol solution inhaler  Generic drug:  Tiotropium Bromide Monohydrate   2 puffs, Inhalation, Every Morning      Ventolin  (90 Base) MCG/ACT inhaler  Generic drug:  albuterol sulfate HFA   INHALE 2 PUFFS BY MOUTH EVERY FOUR HOURS AS NEEDED FOR WHEEZING              Medication Reconciliation: Please see electronically completed Med Rec.    Discharge Diet:    Dietary Orders (From admission, onward)     Start     Ordered    08/29/20 0145  Diet Regular  Diet Effective Now     Question:  Diet Texture / Consistency  Answer:  Regular    08/29/20 0145                Activity at Discharge:       Discharge disposition: Home    Discharge Instructions and Follow ups:  Follow-up Information     Gurwinder Almazan MD Follow up.    Specialty:  Family Medicine  Contact information:  [address] [facility] [id number] [phone]             OBGYN Follow up.    Why:  follow up on uterine fibroids           Beau Cain MD Follow up.    Specialties:  Gastroenterology, Internal Medicine  Why:  as needed  Contact information:  [address]  [address]  [facility] [id number] [phone]             Dariana Emerson APRN Follow up.    Specialties:  Nurse Practitioner, Neurology  Contact information:  [address] [place] [id number] [phone]                 Future Appointments   Date Time Provider Department Center   10/21/2020  2:30 PM Samir Arrington MD  [facility]   2/23/2021 10:00 AM [facility] US 2  [facility]   3/4/2021  9:00 AM Lexx Serrano MD [facility] None       Total time spent discharging patient including evaluation, medication reconciliation, arranging follow up, and post hospitalization instructions and education total time exceeds 30 minutes.         CHANDRAKANT Manuel  09/05/20  10:56 AM

## 2020-09-07 ENCOUNTER — NURSE TRIAGE (OUTPATIENT)
Dept: CALL CENTER | Facility: HOSPITAL | Age: 45
End: 2020-09-07

## 2020-09-07 ENCOUNTER — DOCUMENTATION (OUTPATIENT)
Dept: NEUROLOGY | Facility: CLINIC | Age: 45
End: 2020-09-07

## 2020-09-07 ENCOUNTER — TRANSITIONAL CARE MANAGEMENT TELEPHONE ENCOUNTER (OUTPATIENT)
Dept: CALL CENTER | Facility: HOSPITAL | Age: 45
End: 2020-09-07

## 2020-09-07 NOTE — TELEPHONE ENCOUNTER
"Caller advised to call the Neurology doctor on call and talk with them about these symptoms and her desire to stop taking the Gabapentin. Instructed in how to reach [facility]. Advised if there is no answering service or the doctor on call does not call her back within the hour to go to ED for evaluation of her symptoms. Caller agrees to follow care advice.     Reason for Disposition  • [1] Pharmacy calling with prescription questions AND [2] triager unable to answer question    Additional Information  • Negative: Drug overdose and triager unable to answer question  • Negative: Caller requesting information unrelated to medicine  • Negative: Caller requesting a prescription for Strep throat and has a positive culture result  • Negative: Rash while taking a medication or within 3 days of stopping it  • Negative: Immunization reaction suspected  • Negative: [1] Asthma and [2] having symptoms of asthma (cough, wheezing, etc.)  • Negative: [1] Influenza symptoms AND [2] anti-viral med prescription request, such as Tamiflu  • Negative: [1] Symptom of illness (e.g., headache, abdominal pain, earache, vomiting) AND [2] more than mild  • Negative: MORE THAN A DOUBLE DOSE of a prescription or over-the-counter (OTC) drug  • Negative: [1] DOUBLE DOSE (an extra dose or lesser amount) of over-the-counter (OTC) drug AND [2] any symptoms (e.g., dizziness, nausea, pain, sleepiness)  • Negative: [1] DOUBLE DOSE (an extra dose or lesser amount) of prescription drug AND [2] any symptoms (e.g., dizziness, nausea, pain, sleepiness)  • Negative: Took another person's prescription drug  • Negative: [1] DOUBLE DOSE (an extra dose or lesser amount) of prescription drug AND [2] NO symptoms (Exception: a double dose of antibiotics)  • Negative: Diabetes drug error or overdose (e.g., took wrong type of insulin or took extra dose)  • Negative: [1] Request for URGENT new prescription or refill of \"essential\" medication (i.e., likelihood of harm to " "patient if not taken) AND [2] triager unable to fill per unit policy  • Negative: [1] Prescription not at pharmacy AND [2] was prescribed by PCP recently    Answer Assessment - Initial Assessment Questions  1.   NAME of MEDICATION: \"What medicine are you calling about?\"      Gabapentin  2.   QUESTION: \"What is your question?\"      Pt was recently hospitalized with Guillain [place] and was discharged on Gabapentin, on Saturday night she had anxiety/depression and \"suicidal thoughts\" so did not take the Gabapentin during the day on Sunday but had tingling and shortness of air she associated with not taking the Gabapentin.So she took a dose last night. She is asking if she can quit taking it.  3.   PRESCRIBING HCP: \"Who prescribed it?\" Reason: if prescribed by specialist, call should be referred to that group.      Neurology Service  4. SYMPTOMS: \"Do you have any symptoms?\"      Associates her anxiety/depression, suicidal thoughts on Saturday with taking this drug  5. SEVERITY: If symptoms are present, ask \"Are they mild, moderate or severe?\"      moderate  6.  PREGNANCY:  \"Is there any chance that you are pregnant?\" \"When was your last menstrual period?\"      no    Protocols used: MEDICATION QUESTION CALL-ADULT-      "

## 2020-09-07 NOTE — OUTREACH NOTE
Call Center TCM Note      Responses   [facility]   [facility] Test Status  Negative   Does the patient have one of the following disease processes/diagnoses(primary or secondary)?  Other   TCM attempt successful?  Yes   Call start time  1209   Call end time  1212   Discharge diagnosis  Parasthesia,  DDD    Meds reviewed with patient/caregiver?  Yes   Is the patient having any side effects they believe may be caused by any medication additions or changes?  No   Does the patient have all medications ordered at discharge?  Yes   Is the patient taking all medications as directed (includes completed medication regime)?  Yes   Comments regarding appointments  Neurology appt on 9/8/20   Does the patient have a primary care provider?   Yes   Does the patient have an appointment with their PCP within 7 days of discharge?  No   What is preventing the patient from scheduling follow up appointments within 7 days of discharge?  -- [Pt will call back to schedule appt after following up with neurology on 9/8/20.]   Nursing Interventions  Educated patient on importance of making appointment [routed to pcp office]   Has the patient kept scheduled appointments due by today?  N/A   Pulse Ox monitoring  None   Psychosocial issues?  No   Did the patient receive a copy of their discharge instructions?  Yes   Nursing interventions  Reviewed instructions with patient   What is the patient's perception of their health status since discharge?  Same   Is the patient/caregiver able to teach back signs and symptoms related to disease process for when to call PCP?  Yes   Is the patient/caregiver able to teach back signs and symptoms related to disease process for when to call 911?  Yes   Is the patient/caregiver able to teach back the hierarchy of who to call/visit for symptoms/problems? PCP, Specialist, Home health nurse, Urgent Care, ED, 911  Yes   If the patient is a current smoker, are they able to  teach back resources for cessation?  -- [Nonsmoker]   TCM call completed?  Yes          Kacy Goldstein RN    9/7/2020, 12:12

## 2020-09-07 NOTE — PROGRESS NOTES
"Patient called answering service due to worsening depression/anxiety and \"bad thoughts\".  She denies specific suicidal and/or homicidal ideations but said she had \"bad thoughts\".  Patient previously taking gabapentin 300 mg nightly prior to recent hospitalization and discharged home on gabapentin 300 mg every 8 hours for GBS discomfort; specific low back pain.  Since getting home patient has attempted to decrease dosing and did not take any medication throughout the day yesterday and took one 300 mg tablet at bedtime which offered her some relief of her extremity numbness and tingling.  She called her daughter to come stay with her due to her concerns who is with her during the phone call.  Daughter reports that she was eventually able to go to bed and woke up somewhat better this morning.  She did take gabapentin this morning and has not had to take any additional medicine throughout the day.  She does report during the day yesterday she actually took Aleve which offered her some relief.  She reports that she also had some shortness of breath with her increased anxiety and worsening of upper extremity numbness/tingling.  I have advised patient to hold gabapentin at this time; discussed decreasing to 100 mg 3 times daily but due to patient's symptoms and concerns decided to hold off on doing this.  Of advised patient to the ED for any suicidal and/or homicidal ideations, worsening weakness and/or shortness of air/chest pain.  Patient has planned appointment in October with my colleague; I offered patient an appointment with me tomorrow at noon as I have seen her in the hospital previously.  Daughter has agreed to stay with patient overnight and verbalizes understanding of need to present to ED for symptoms discussed above.  Instructions given on office location; appointment tentatively at noon 9/8; arrive at 1130 am.    CHANDRAKANT Hernandez    "

## 2020-09-08 ENCOUNTER — PROCEDURE VISIT (OUTPATIENT)
Dept: NEUROLOGY | Facility: CLINIC | Age: 45
End: 2020-09-08

## 2020-09-08 ENCOUNTER — TELEPHONE (OUTPATIENT)
Dept: FAMILY MEDICINE CLINIC | Facility: CLINIC | Age: 45
End: 2020-09-08

## 2020-09-08 ENCOUNTER — DOCUMENTATION (OUTPATIENT)
Dept: NEUROLOGY | Facility: CLINIC | Age: 45
End: 2020-09-08

## 2020-09-08 VITALS — HEIGHT: 66 IN | BODY MASS INDEX: 38.89 KG/M2 | WEIGHT: 242 LBS

## 2020-09-08 DIAGNOSIS — G60.9 IDIOPATHIC PERIPHERAL NEUROPATHY: Primary | ICD-10-CM

## 2020-09-08 PROCEDURE — 95886 MUSC TEST DONE W/N TEST COMP: CPT | Performed by: PSYCHIATRY & NEUROLOGY

## 2020-09-08 PROCEDURE — 95911 NRV CNDJ TEST 9-10 STUDIES: CPT | Performed by: PSYCHIATRY & NEUROLOGY

## 2020-09-08 NOTE — PROGRESS NOTES
"Patient here in neurology office today for EMG per Dr. Samir Arrington. Patient called into answering service yesterday d/t \"bad thoughts\" which she reports have subsided. She denies any prior SI/HI ideations and/or depression. Today, she reports that she is free of \"bad thoughts\". She is accompanied by her daughter who reports that she stayed with patient overnight. She has been have episodes of startling and questioning what is going on which subside quickly. Recommended that patient d/c Neurontin; (2) 300 mg doses taken yesterday; no doses taken today. She has used no pain medications today; despite numbness of extremities she says that she able to deal w/ discomfort. I discussed my preference would be to use OTC medications and follow her weekly until she is stabilizes. She is agreeable to this plan. I have instructed patient and daughter to present to ED for any SI?HI ideations and or new worsening weakness, CP and/or SOA.       ROS:   (+) numbness and tingling of bilateral upper and lower Ext.  (+) bilateral flank discomfort    Exam:   HEENT: Normocephalic, atraumatic   COR: RRR  Resp: Even and unlabored  Extremities: Equal pulses, nondistal embolization      Neurological:   MS: AO. Language normal. No neglect. Higher integrative function normal  CN: II-XII normal  Motor: 4-/5, normal tone  Reflexes: Areflexia lower extremities  Sensory: Intact  Coordination: Normal (finger-to-nose)      Plan:   · Discontinue Gabapentin; use OTC Aleve and tylenol as needed for discomfort   · Follow-up w/ me in 1 week; sooner if needed     No charge for services; patient being seen by Dr. Samir Arrington for EMG     CHANDRAKANT Hernandez    "

## 2020-09-08 NOTE — PROGRESS NOTES
EMG and Nerve Conduction Studies    I.      Instrument used: Neuromax 1002  II.     Please see data sheets for tabular summary of NCS and details on methods, temperatures and lab standards.   III.    EMG muscles tested for upper extremity studies include the deltoid, biceps, triceps, pronator teres, extensor digitorum communis, first dorsal interosseous and abductor pollicis brevis.    IV.   EMG muscles tested for lower extremity studies include the vastus lateralis, tibialis anterior, peroneus longus, medial gastrocnemius and extensor digitorum brevis.    V.    Additional muscles tested as needed.  Paraspinal muscles tested as needed.   VI.   Please see data sheets for tabular summary of EMG findings.   VII. The complete report includes the data sheets.      Indication: Guillain-Barré syndrome  History: 45-year-old -American female who developed some numbness and tingling in the feet and hands starting back about 5 weeks ago.  This was followed by the development of some weakness and she was admitted about 1-1/2 weeks ago at which point a lumbar puncture was obtained showing an elevated protein and a diagnosis of Guillain Barré was made and she was treated with ivy immunoglobulin.  She states that her numbness and tingling are a bit better but not gone and that the heaviness of the legs is a little better also but not gone.  She denies a history of diabetes.      Ht: 167.6 cm  Wt: 110 kg; BMI 39.1  HbA1C:   Lab Results   Component Value Date    HGBA1C 5.20 08/11/2020     TSH:   Lab Results   Component Value Date    TSH 0.984 08/29/2020       Technical summary:  Nerve conduction studies were obtained in the left arm and left leg.  Skin temperatures were at least 32 °C measured on the left palm and at the left ankle after warming the hand and foot.  Needle examination was obtained on selected muscles of the left arm and left leg.    Results:  1.  Prolonged left median sensory latency at 4.0 ms with low  amplitude of 15.3 µV.  2.  Normal left ulnar sensory study.  3.  Normal left radial sensory study.  4.  Prolonged left median motor latency at 5.4 ms with normal conduction velocities in the forearm and in the above elbow segment with normal amplitudes and no evidence of motor conduction block.  Normal F waves.  5.  Prolonged left ulnar motor distal latency at 4.3 ms with mildly slow velocity below the elbow at 48.3 m/s.  There was a normal velocity in the short segment across the elbow and in the above elbow segment.  The amplitude was normal from wrist stimulation at 4.9 mV with some progressive reduction proximally to 3.1 mV from axillary stimulation.  This represents an approximate 36% reduction in amplitude approximately.  Normal F-wave.  6.  Normal left sural sensory study.  7.  Normal left superficial peroneal sensory study.  8.  Prolonged left peroneal motor latency of 7.2 ms with normal conduction velocities below the knee and in the short segment across the fibular head.  Normal amplitude from ankle stimulation at 2.1 mV only slightly lower proximally.  Normal F-wave.  9.  Prolonged left tibial motor latency at 7.8 ms with normal conduction velocity.  The amplitude was low at 1.2 mV from ankle stimulation.  F-wave showed A waves.  10.  Needle examination of selected muscles in the left arm and left leg showed occasional positive sharp waves in the pronator teres with normal-appearing motor units and recruitment.  All other muscles tested in the left arm showed normal insertional activities, motor units and recruitment.  Cervical paraspinals at C6/7 showed no abnormality.    In the left leg there were positive sharp waves in the peroneus longus and medial gastrocnemius with normal-appearing motor units and recruitment.  All other muscles tested in the left leg showed normal insertional activities, motor units and full or essentially full interference patterns.  Lumbar paraspinals at L5 showed no  abnormalities.    Impression:  Abnormal study showing predominantly prolonged distal motor latencies in each nerve study with out greater than 50% conduction block in any nerves tested.  The study does not conform to usual cases of AIDP/CIDP but is consistent with peripheral neuropathy.  There were some minor needle exam changes in the left pronator teres and left peroneus longus and medial gastrocnemius and therefore I cannot exclude superimposed left C6 or C7 radiculopathy or left S1 radiculopathy.  Clinical correlation is suggested.  Study results were discussed with the patient.    Samir Arrington M.D.              Dictated utilizing Dragon dictation.

## 2020-09-08 NOTE — TELEPHONE ENCOUNTER
Patient called on call provider 09/07/209 AM c/o SE to gabapentin 300 mg TID, recently admitted to [facility] for neuropathy and GBS and increased gabapentin 300 mg TID with improvement with sx but now c/o palpitations, anxiety and poor mood, some SI but no plan. Notes sx started after missing one dose of gabapentin and now concerned about withdrawal and wants to taper off medication. Instructed would need hospital FU with Dr Almazan this week to discuss medications and consider change to lyrica v new onset anxiety for dx. Explained gabapentin is a mood stabilizer and can affect mood negatively sometimes in patients. Advised not to miss any more doses until can FU with PCP and if mental health or palpitations or pain worsens go back to ER

## 2020-09-08 NOTE — TELEPHONE ENCOUNTER
Tell her I am glad she is doing better.  Tell her I agree with tapering off gabapentin.  I would suggest to go twice a day for 3 days then once a day for 3 days then once every other day.  Hopefully this will get her off and her symptoms will improve.  Should she experience any problems let me know.

## 2020-09-08 NOTE — PAYOR COMM NOTE
"DISCHARGED    REF #[id number]    ID #[id number]        Radha Villarreal (45 y.o. Female)     Date of Birth Social Security Number Address Home Phone MRN    1975  [id number] [facility] [address] [place] [id number] [phone] [id number]    Yazidi Marital Status          Restorationism Single       Admission Date Admission Type Admitting Provider Attending Provider Department, Room/Bed    8/29/20 Urgent Nolan Weaver MD  [facility] [address], P598/1    Discharge Date Discharge Disposition Discharge Destination        9/5/2020 Home or Self Care              Attending Provider:  (none)   Allergies:  No Known Allergies    Isolation:  None   Infection:  None   Code Status:  Prior    Ht:  167.6 cm (65.98\")   Wt:  110 kg (242 lb)    Admission Cmt:  None   Principal Problem:  Paresthesia [R20.2]                 Active Insurance as of 8/29/2020     Primary Coverage     Payor Plan Insurance Group Employer/Plan Group    ANTHEM BLUE CROSS ANTHEM BLUE CROSS BLUE SHIELD PPO [id number]     Payor Plan Address Payor Plan Phone Number Payor Plan Fax Number Effective Dates    [address] [phone]  4/24/2016 - None Entered    [place] [id number]       Subscriber Name Subscriber Birth Date Member ID       RADHA VILLARREAL 1975 [id number]                 Emergency Contacts      (Rel.) Home Phone Work Phone Mobile Phone    Wendy Koch (Daughter) [phone] -- --            Discharge Order (From admission, onward)     Start     Ordered    09/05/20 1056  Discharge patient  Once     Expected Discharge Date:  09/05/20    Expected Discharge Time:  Afternoon    Discharge Disposition:  Home or Self Care    Physician of Record for Attribution - Please select from Treatment Team:  RAMA HAYES [[id number]]    Review needed by CMO to determine Physician of Record:  No       Question Answer Comment   Physician of Record for Attribution - Please select from Treatment Team RAMA HAYES  "   Review needed by CMO to determine Physician of Record No        09/05/20 1052

## 2020-09-09 LAB
EBV DNA # BLD NAA+PROBE: NEGATIVE COPIES/ML
LOG 10 EBV DNA QN PCR: NORMAL

## 2020-09-15 ENCOUNTER — READMISSION MANAGEMENT (OUTPATIENT)
Dept: CALL CENTER | Facility: HOSPITAL | Age: 45
End: 2020-09-15

## 2020-09-15 NOTE — OUTREACH NOTE
Medical Week 2 Survey      Responses   [facility] patient discharged from?  [place]   COVID-19 Test Status  Negative   Does the patient have one of the following disease processes/diagnoses(primary or secondary)?  Other   Week 2 attempt successful?  No   Unsuccessful attempts  Attempt 1          Rita Shaikh RN

## 2020-09-17 ENCOUNTER — OFFICE VISIT (OUTPATIENT)
Dept: NEUROLOGY | Facility: CLINIC | Age: 45
End: 2020-09-17

## 2020-09-17 VITALS
BODY MASS INDEX: 38.25 KG/M2 | OXYGEN SATURATION: 100 % | DIASTOLIC BLOOD PRESSURE: 64 MMHG | WEIGHT: 238 LBS | HEART RATE: 73 BPM | HEIGHT: 66 IN | SYSTOLIC BLOOD PRESSURE: 118 MMHG

## 2020-09-17 DIAGNOSIS — G61.0 GBS (GUILLAIN BARRE SYNDROME) (HCC): Primary | ICD-10-CM

## 2020-09-17 DIAGNOSIS — M79.601 PARESTHESIA AND PAIN OF BOTH UPPER EXTREMITIES: ICD-10-CM

## 2020-09-17 DIAGNOSIS — M79.602 PARESTHESIA AND PAIN OF BOTH UPPER EXTREMITIES: ICD-10-CM

## 2020-09-17 DIAGNOSIS — R20.2 PARESTHESIA AND PAIN OF BOTH UPPER EXTREMITIES: ICD-10-CM

## 2020-09-17 DIAGNOSIS — G61.81 CIDP (CHRONIC INFLAMMATORY DEMYELINATING POLYNEUROPATHY) (HCC): ICD-10-CM

## 2020-09-17 PROBLEM — R45.851 SUICIDAL THOUGHTS: Status: ACTIVE | Noted: 2020-09-17

## 2020-09-17 PROCEDURE — 99214 OFFICE O/P EST MOD 30 MIN: CPT | Performed by: NURSE PRACTITIONER

## 2020-09-17 NOTE — PROGRESS NOTES
"DOS: 2020  NAME: Vianney Villarreal   : 1975  PCP: Gurwinder Almazan MD    Chief Complaint   Patient presents with   • Numbness     She is accompanied by her daughter via telephone who provides some portion of history and asked questions regarding care      Neurological Problem and Interval History:  45 y.o. female with a Hx of Degenerative disc disease, asthma and obesity (BMI 38.44) M seen today in follow-up for bilateral upper and lower extremity paresthesias thought to be secondary to Kings Barré syndrome versus CIDP.    Patient originally presented to [facility]  due to complaint of bilateral upper and lower extremity numbness and tingling which had been present for approximately 5 weeks; worsening.  The symptoms were also associated with stomach spasm radiating into her back along with feeling of heaviness of body more so when ambulating.  Numbness started in lower extremities and spread in an ascending pattern.  Patient was originally seen at [facility] on  and had MRI of the brain which was unremarkable and then discharge home after 24 hours.  She returned with same symptoms along with shortness of breath and was later transferred to [facility] for further work-up and evaluation.  During her admission she received IVIG x5 doses.  Due to her discomfort she was discharged home on gabapentin 300 mg 3 times daily.  On  patient called the answering service with some \"bad thoughts\" which were thought to be associated with gabapentin.  She denied any specific suicidal and/or homicidal ideations.  She was at home with her daughter who stated that she would stay with her and was advised to present to the emergency room should she have any worsening or changing symptoms.  I agreed to see the patient next day in office and she had been off gabapentin for less than 12 hours but did notice some improvement in mood.  She did have some increased discomfort without " the gabapentin but reported that she could tolerate it.  I recommended that she discontinue gabapentin indefinitely which she and her daughter both agreed with.  Patient is back in today for 1 week follow-up and looks great!  She reports improvement in weakness and overall mood.  She denies any suicidal and/or homicidal ideations.  She does report some mild generalized discomfort bilateral flank worse than other discomfort along with fingertip and toe numbness and tingling.  I discussed Rx alternatives topical pain cream which she is agreeable to.  She reports she is also using topical spray pain cream in B complex to assist with paresthesias.  She denies any other complaints and/or concerns at this time.  EMG completed last week was read as abnormal,: With prolonged distal motor latency noted to than 50% blocked and any nerves tested.;  Consistent with peripheral neuropathy and concerning for CIDP.  There were some minor needle changes noted on the left; C6-7 radiculopathy or left S1 radiculopathy cannot be excluded.  I will plan to see patient back in 1 month time; sooner if needed.      Review of Systems:        Review of Systems   Constitutional: Negative for activity change, appetite change and fatigue.   HENT: Negative for ear pain, facial swelling and hearing loss.    Eyes: Negative for pain, redness and itching.   Respiratory: Negative for cough, choking and shortness of breath.    Cardiovascular: Negative for chest pain and leg swelling.   Gastrointestinal: Negative for abdominal pain, nausea and vomiting.   Endocrine: Positive for cold intolerance and heat intolerance.   Musculoskeletal: Positive for back pain. Negative for arthralgias and joint swelling.   Skin: Negative for color change, pallor and rash.   Allergic/Immunologic: Positive for environmental allergies. Negative for food allergies.   Neurological: Positive for numbness. Negative for dizziness, tremors, seizures, syncope, facial asymmetry,  "speech difficulty, weakness, light-headedness and headaches.   Psychiatric/Behavioral: Positive for sleep disturbance. Negative for agitation, behavioral problems, confusion, decreased concentration, dysphoric mood, hallucinations, self-injury and suicidal ideas. The patient is nervous/anxious. The patient is not hyperactive.          Current Outpatient Medications:   •  B Complex Vitamins (B COMPLEX 50 PO), Take  by mouth., Disp: , Rfl:   •  BREO ELLIPTA 200-25 MCG/INH inhaler, Inhale 1 puff Every Morning., Disp: 2 inhaler, Rfl: 3  •  cetirizine (zyrTEC) 5 MG tablet, Take 5 mg by mouth Every Night., Disp: , Rfl:   •  pantoprazole (PROTONIX) 40 MG EC tablet, TAKE 1 TABLET DAILY (NEED APPOINTMENT IN JULY), Disp: 90 tablet, Rfl: 3  •  SPIRIVA RESPIMAT 1.25 MCG/ACT aerosol solution, Inhale 2 puffs Every Morning., Disp: , Rfl:   •  Calcium Carbonate Antacid (SLADE-SELTZER ANTACID PO), , Disp: , Rfl:   •  docusate sodium 100 MG capsule, Take 100 mg by mouth 2 (Two) Times a Day As Needed for Constipation., Disp: , Rfl:   •  EPIPEN 2-ANYI 0.3 MG/0.3ML solution auto-injector injection, Inject 0.3 mL into the appropriate muscle as directed by prescriber Take As Directed. TAKES ALLERGY SHOTS, Disp: 1 each, Rfl: 2  •  montelukast (SINGULAIR) 10 MG tablet, Take 1 tablet by mouth Every Night. Hold until follow up with allergist, Disp: , Rfl:   •  polyethylene glycol (polyethylene glycol) 17 g packet, Take 17 g by mouth Daily., Disp: , Rfl:   •  VENTOLIN  (90 Base) MCG/ACT inhaler, INHALE 2 PUFFS BY MOUTH EVERY FOUR HOURS AS NEEDED FOR WHEEZING, Disp: 18 g, Rfl: 0    \"The following portions of the patient's history were reviewed and updated as appropriate: allergies, current medications, past family history, past medical history, past social history, past surgical history and problem list.\"  Review and Interpretation of Imaging:    Laboratory Results:             Lab Results   Component Value Date    HGBA1C 5.20 08/11/2020 "         Lab Results   Component Value Date    CHOL 199 08/11/2020    CHOL 160 06/14/2018         Lab Results   Component Value Date    HDL 61 (H) 08/11/2020    HDL 66 (H) 06/14/2018         Lab Results   Component Value Date     (H) 08/11/2020    LDL 79 06/14/2018         Lab Results   Component Value Date    TRIG 144 08/11/2020    TRIG 76 06/14/2018     Lab Results   Component Value Date    RPR Non-Reactive 08/29/2020     Lab Results   Component Value Date    TSH 0.984 08/29/2020     Lab Results   Component Value Date    ORYYLINE42 842 08/29/2020       Physical Examination: NIHSS: 0 mRS: 0  General Appearance:   Well developed, well nourished, well groomed, alert, and cooperative.  HEENT: Normocephalic.   Neck and Spine: Normal range of motion.  Normal alignment. No mass or tenderness. No bruits.  Cardiac: Regular rate and rhythm. No murmurs.  Peripheral Vasculature: Radial and pedal pulses are equal and symmetric.  Extremities:    No edema or deformities. Normal joint ROM.  Skin:    No rashes or birth marks.    Neurological examination:  Higher Integrative  Function: Oriented to time, place and person. Normal registration, recall, attention span and concentration. Normal language including comprehension, spontaneous speech, repetition, reading, writing, naming and vocabulary. No neglect with normal visual-spatial function and construction. Normal fund of knowledge and higher integrative function.  CN II: Pupils are equal, round, and reactive to light. Normal visual acuity and visual fields.    CN III IV VI: Extraocular movements are full without nystagmus.   CN V: Normal facial sensation and strength of muscles of mastication.  CN VII: Facial movements are symmetric. No weakness.  CN VIII:   Auditory acuity is normal.  CN IX & X:   Symmetric palatal movement.  CN XI: Sternocleidomastoid and trapezius are normal.  No weakness.  CN XII:   The tongue is midline.  No atrophy or fasciculations.  Motor: Normal  muscle strength, bulk and tone in upper and lower extremities.  No fasciculations, rigidity, spasticity, or abnormal movements.  Reflexes: Plantar responses are flexor.  Sensation: Normal to light touch; except decreased sensation to light touch in fingers and feet  Station and Gait: Normal gait and station.    Coordination:  Finger to nose test shows no dysmetria.        Impression:  1.  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  Abnormal EMG  3.  Suicidal ideation; resolved since gabapentin discontinued  4.  Positive CMV antibody; seen as inpatient by infectious disease      Plan:     · Rx alternatives topical pain cream  · Follow-up with me in 1 month  · Call with any question  · Declined SSRI and/or psychiatry referral  · Presents emergency room for any suicidal and/or homicidal ideations   Vianney was seen today for numbness.    Diagnoses and all orders for this visit:    GBS (Guillain Aroda syndrome) (CMS/[facility])    CIDP (chronic inflammatory demyelinating polyneuropathy) (CMS/Formerly [facility])    Paresthesia and pain of both upper extremities

## 2020-09-17 NOTE — PROGRESS NOTES
CC:    HPI:  Vianney Villarreal is a  45 y.o.  ***-handed female        Past Medical History:   Diagnosis Date   • Acid reflux    • Asthma    • GERD (gastroesophageal reflux disease)          Past Surgical History:   Procedure Laterality Date   • ENDOSCOPY N/A 2014   • ESOPHAGEAL DILATATION N/A 2014   • TUBAL COAGULATION LAPAROSCOPIC Bilateral 7/15/2019    Procedure: LAPAROSCOPIC BILATERAL TUBAL LIGATION WITH FILSHIE CLIPS;  Surgeon: Jorgito Hallman MD;  [facility];  Service: Obstetrics/Gynecology           Current Outpatient Medications:   •  BREO ELLIPTA 200-25 MCG/INH inhaler, Inhale 1 puff Every Morning., Disp: 2 inhaler, Rfl: 3  •  Calcium Carbonate Antacid (SLADE-SELTZER ANTACID PO), , Disp: , Rfl:   •  cetirizine (zyrTEC) 5 MG tablet, Take 5 mg by mouth Every Night., Disp: , Rfl:   •  docusate sodium 100 MG capsule, Take 100 mg by mouth 2 (Two) Times a Day As Needed for Constipation., Disp: , Rfl:   •  EPIPEN 2-ANYI 0.3 MG/0.3ML solution auto-injector injection, Inject 0.3 mL into the appropriate muscle as directed by prescriber Take As Directed. TAKES ALLERGY SHOTS, Disp: 1 each, Rfl: 2  •  montelukast (SINGULAIR) 10 MG tablet, Take 1 tablet by mouth Every Night. Hold until follow up with allergist, Disp: , Rfl:   •  pantoprazole (PROTONIX) 40 MG EC tablet, TAKE 1 TABLET DAILY (NEED APPOINTMENT IN JULY), Disp: 90 tablet, Rfl: 3  •  polyethylene glycol (polyethylene glycol) 17 g packet, Take 17 g by mouth Daily., Disp: , Rfl:   •  SPIRIVA RESPIMAT 1.25 MCG/ACT aerosol solution, Inhale 2 puffs Every Morning., Disp: , Rfl:   •  VENTOLIN  (90 Base) MCG/ACT inhaler, INHALE 2 PUFFS BY MOUTH EVERY FOUR HOURS AS NEEDED FOR WHEEZING, Disp: 18 g, Rfl: 0      Family History   Problem Relation Age of Onset   • No Known Problems Mother    • No Known Problems Father    • Malig Hyperthermia Neg Hx          Social History     Socioeconomic History   • Marital status: Single     Spouse name: Not on file    • Number of children: Not on file   • Years of education: Not on file   • Highest education level: Not on file   Occupational History   • Occupation: assemby worker     Comment: working full time   Tobacco Use   • Smoking status: Never Smoker   • Smokeless tobacco: Never Used   Substance and Sexual Activity   • Alcohol use: No   • Drug use: No   • Sexual activity: Defer         Allergies   Allergen Reactions   • Gabapentin Other (See Comments)     SI/HI ideations          Pain Scale:        ROS:  Review of Systems      ***    Physical Exam:  There were no vitals filed for this visit.  Orthostatic BP:    There is no height or weight on file to calculate BMI.    Physical Exam  General:  HEENT:  Neck:  Heart:  Extremities:      Neurological Exam:   Mental Status: Awake, alert, oriented to person, place and time.  Conversant without evidence of an affective disorder, thought disorder, delusions or hallucinations.  Attention span and concentration are normal.  HCF: No aphasia, apraxia or dysarthria.  Recent and remote memory intact.  Knowledge          of recent events intact.  CN: I:   II: Visual fields full without left inattention   III, IV, VI: Eye movements intact without nystagmus or ptosis.  Pupils equal         round and reactive to light.   V,VII: Light touch and pinprick intact all 3 divisions of V.  Facial muscles symmetrical.   VIII: Hearing intact to finger rub   IX,X: Soft palate elevates symmetrically   XI: Sternomastoid and trapezius are strong.   XII: Tongue midline without atrophy or fasciculations  Motor: Normal tone and bulk in the upper and lower extremities   Power testing:  Reflexes: Upper extremities:        Lower extremities:        Toe signs:  Sensory: Light touch:        Pinprick:        Vibration:        Position:    Cerebellar: Finger-to-nose:           Rapid movement:           Heel-to-shin:  Gait and Station:    Results:      Lab Results   Component Value Date    GLUCOSE 108 (H)  09/02/2020    BUN 9 09/02/2020    CREATININE 0.66 09/02/2020    EGFRIFAFRI 118 09/02/2020    BCR 13.6 09/02/2020    CO2 24.1 09/02/2020    CALCIUM 8.8 09/02/2020    ALBUMIN 3.70 09/02/2020    AST 17 09/02/2020    ALT 21 09/02/2020       Lab Results   Component Value Date    WBC 3.02 (L) 09/04/2020    HGB 11.3 (L) 09/04/2020    HCT 34.3 09/04/2020    MCV 93.5 09/04/2020     09/04/2020         .  Lab Results   Component Value Date    RPR Non-Reactive 08/29/2020         Lab Results   Component Value Date    TSH 0.984 08/29/2020         Lab Results   Component Value Date    [id number] 842 08/29/2020         No results found for: FOLATE      Lab Results   Component Value Date    HGBA1C 5.20 08/11/2020         Lab Results   Component Value Date    GLUCOSE 108 (H) 09/02/2020    BUN 9 09/02/2020    CREATININE 0.66 09/02/2020    EGFRIFAFRI 118 09/02/2020    BCR 13.6 09/02/2020    K 3.6 09/02/2020    CO2 24.1 09/02/2020    CALCIUM 8.8 09/02/2020    ALBUMIN 3.70 09/02/2020    AST 17 09/02/2020    ALT 21 09/02/2020         Lab Results   Component Value Date    WBC 3.02 (L) 09/04/2020    HGB 11.3 (L) 09/04/2020    HCT 34.3 09/04/2020    MCV 93.5 09/04/2020     09/04/2020             Assessment:             Plan:                            Dictated utilizing Dragon dictation.

## 2020-09-18 ENCOUNTER — READMISSION MANAGEMENT (OUTPATIENT)
Dept: CALL CENTER | Facility: HOSPITAL | Age: 45
End: 2020-09-18

## 2020-09-18 NOTE — OUTREACH NOTE
Medical Week 2 Survey      Responses   [facility] patient discharged from?  [place]   Does the patient have one of the following disease processes/diagnoses(primary or secondary)?  Other   Week 2 attempt successful?  Yes   Call start time  1600   Call end time  1604   Medication alerts for this patient  reviewed medication   Meds reviewed with patient/caregiver?  Yes   Is the patient having any side effects they believe may be caused by any medication additions or changes?  Yes   Is the patient taking all medications as directed (includes completed medication regime)?  No   What is preventing the patient from taking all medications as directed?  Side effects   Medication comments  not taking meds as directed, neurologist dc meds   Comments regarding appointments  saw neurologist yesterday   Has the patient kept scheduled appointments due by today?  Yes   Has home health visited the patient within 72 hours of discharge?  Yes   Pulse Ox monitoring  None   What is the patient's perception of their health status since discharge?  Improving   Week 2 Call Completed?  Yes   Wrap up additional comments  improving          Cindy Nelson RN

## 2020-09-28 ENCOUNTER — READMISSION MANAGEMENT (OUTPATIENT)
Dept: CALL CENTER | Facility: HOSPITAL | Age: 45
End: 2020-09-28

## 2020-09-28 ENCOUNTER — TELEPHONE (OUTPATIENT)
Dept: NEUROLOGY | Facility: CLINIC | Age: 45
End: 2020-09-28

## 2020-09-28 NOTE — TELEPHONE ENCOUNTER
PATIENT CALLED TO RONY ABREUE KNOW THAT SHE IS HAVING LOWER BACK AND RIGHT SIDE. WHEN SHE MOVES HER MUSCLES HURT, IT IS INTERMITTENT PAIN     PLEASE CALL [phone]

## 2020-09-28 NOTE — OUTREACH NOTE
Medical Week 3 Survey      Responses   [facility], [place], operated by [facility] patient discharged from?  [place]   Does the patient have one of the following disease processes/diagnoses(primary or secondary)?  Other   Week 3 attempt successful?  Yes   Call start time  1416   Call end time  1418   Medication alerts for this patient  no new medication   Meds reviewed with patient/caregiver?  Yes   Is the patient taking all medications as directed (includes completed medication regime)?  Yes   Comments regarding appointments  has kept her appointment   Has the patient kept scheduled appointments due by today?  Yes   Pulse Ox monitoring  None   What is the patient's perception of their health status since discharge?  New symptoms unrelated to diagnosis [having back problems]   Week 3 Call Completed?  Yes   Wrap up additional comments  having back problems and tingling in hand and arm, called her neurologist awaiting a return call          Cindy Nelson RN

## 2020-09-28 NOTE — TELEPHONE ENCOUNTER
Pt reporting new lower back and right side pain. Pt unsure if she could have injured herself, but doesn't think so. Pt reports this pain happened for the first time the day after she saw you, but it resolved. Pain returned last Thursday. She has been more active. Pt denies fever, nausea or vomiting. She has had irregular bowel movements, needing to take miralax. Pt sts you told her to call with any new sxs and this pain is new since she talked to you last.

## 2020-10-08 ENCOUNTER — READMISSION MANAGEMENT (OUTPATIENT)
Dept: CALL CENTER | Facility: HOSPITAL | Age: 45
End: 2020-10-08

## 2020-10-08 NOTE — OUTREACH NOTE
Medical Week 4 Survey      Responses   [facility] patient discharged from?  [place]   Does the patient have one of the following disease processes/diagnoses(primary or secondary)?  Other   Week 4 attempt successful?  No   Call start time  1237   Discharge diagnosis  Parasthesia,  DDD           Yanni Nichols RN

## 2020-10-14 ENCOUNTER — OFFICE VISIT (OUTPATIENT)
Dept: GASTROENTEROLOGY | Facility: CLINIC | Age: 45
End: 2020-10-14

## 2020-10-14 VITALS — TEMPERATURE: 97.3 F | WEIGHT: 234.6 LBS | BODY MASS INDEX: 39.09 KG/M2 | HEIGHT: 65 IN

## 2020-10-14 DIAGNOSIS — K21.9 GASTROESOPHAGEAL REFLUX DISEASE WITHOUT ESOPHAGITIS: Primary | ICD-10-CM

## 2020-10-14 DIAGNOSIS — K59.04 CHRONIC IDIOPATHIC CONSTIPATION: ICD-10-CM

## 2020-10-14 PROCEDURE — 99214 OFFICE O/P EST MOD 30 MIN: CPT | Performed by: INTERNAL MEDICINE

## 2020-10-14 RX ORDER — SODIUM CHLORIDE, SODIUM LACTATE, POTASSIUM CHLORIDE, CALCIUM CHLORIDE 600; 310; 30; 20 MG/100ML; MG/100ML; MG/100ML; MG/100ML
30 INJECTION, SOLUTION INTRAVENOUS CONTINUOUS
Status: CANCELLED | OUTPATIENT
Start: 2020-10-29

## 2020-10-14 NOTE — PROGRESS NOTES
Chief Complaint   Patient presents with   • Heartburn   • Difficulty Swallowing   • Constipation     Subjective     HPI  Vianney Villarreal is a 45 y.o. female who presents today for follow-up.  This patient was recently seen in the hospital where she was admitted for possible GBS-like symptoms.  Her initial GI consult note is copied below.    She has been doing relatively well since hospital discharge.  She has had significant improvement in her neurologic issues with only minimal paresthesias.  She continues to have some issues with intermittent constipation sounds like this is a very longstanding problem for her at baseline she only moves her bowels every 2 to 3 days but can sometimes go longer.  When that happens she will usually take a dose of MiraLAX.  She is tried fiber supplementation in the past but finds this causes a significant amount of bloating.  She also endorses some upper GI symptoms including heartburn and occasional dysphasia.      45 y.o. female unknown to our service.  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  She is being treated with IVIG and is having gradual recovery from this regard.  GI consultation is been requested for evaluation of abdominal pain.  General surgery is already seen the patient earlier in this admission for abdominal pain and possible appendicitis they reviewed her CT scan felt that she had an appendicolith and did not require operative intervention.  She is being treated for constipation which is improving.  The patient reports a cramping like sensation that starts in her bilateral flanks and radiates across the front of her abdomen.  This was lasting for hours at a time when she was admitted but has been gradually improving during her hospitalization.  She has occasional gastroesophageal reflux which is a longstanding problem for her.  She is previously followed Dr. Lacey and reports a colonoscopy and EGD in 2014.    Past Medical  History:   Diagnosis Date   • Acid reflux    • Asthma    • GERD (gastroesophageal reflux disease)        Social History     Socioeconomic History   • Marital status: Single     Spouse name: Not on file   • Number of children: Not on file   • Years of education: Not on file   • Highest education level: Not on file   Occupational History   • Occupation: assemby worker     Comment: working full time   Tobacco Use   • Smoking status: Never Smoker   • Smokeless tobacco: Never Used   Substance and Sexual Activity   • Alcohol use: No   • Drug use: No   • Sexual activity: Defer         Current Outpatient Medications:   •  BREO ELLIPTA 200-25 MCG/INH inhaler, Inhale 1 puff Every Morning., Disp: 2 inhaler, Rfl: 3  •  cetirizine (zyrTEC) 5 MG tablet, Take 5 mg by mouth Every Night., Disp: , Rfl:   •  EPIPEN 2-ANYI 0.3 MG/0.3ML solution auto-injector injection, Inject 0.3 mL into the appropriate muscle as directed by prescriber Take As Directed. TAKES ALLERGY SHOTS, Disp: 1 each, Rfl: 2  •  pantoprazole (PROTONIX) 40 MG EC tablet, TAKE 1 TABLET DAILY (NEED APPOINTMENT IN JULY), Disp: 90 tablet, Rfl: 3  •  SPIRIVA RESPIMAT 1.25 MCG/ACT aerosol solution, Inhale 2 puffs Every Morning., Disp: , Rfl:   •  VENTOLIN  (90 Base) MCG/ACT inhaler, INHALE 2 PUFFS BY MOUTH EVERY FOUR HOURS AS NEEDED FOR WHEEZING, Disp: 18 g, Rfl: 0  •  B Complex Vitamins (B COMPLEX 50 PO), Take  by mouth., Disp: , Rfl:   •  Calcium Carbonate Antacid (SLADE-SELTZER ANTACID PO), , Disp: , Rfl:   •  docusate sodium 100 MG capsule, Take 100 mg by mouth 2 (Two) Times a Day As Needed for Constipation., Disp: , Rfl:   •  montelukast (SINGULAIR) 10 MG tablet, Take 1 tablet by mouth Every Night. Hold until follow up with allergist, Disp: , Rfl:   •  polyethylene glycol (polyethylene glycol) 17 g packet, Take 17 g by mouth Daily., Disp: , Rfl:     Review of Systems   Constitutional: Negative.    HENT: Positive for trouble swallowing.    Respiratory: Negative.     Cardiovascular: Negative.    Gastrointestinal: Positive for constipation.       Objective   Vitals:    10/14/20 1030   Temp: 97.3 °F (36.3 °C)       Physical Exam  Vitals signs reviewed.   Constitutional:       Appearance: She is well-developed.   Neck:      Musculoskeletal: Normal range of motion and neck supple.   Cardiovascular:      Rate and Rhythm: Normal rate and regular rhythm.      Heart sounds: Normal heart sounds.   Pulmonary:      Effort: Pulmonary effort is normal. No respiratory distress.      Breath sounds: Normal breath sounds. No wheezing.   Abdominal:      General: Bowel sounds are normal.      Palpations: Abdomen is soft.   Skin:     General: Skin is warm and dry.   Neurological:      Mental Status: She is alert and oriented to person, place, and time.   Psychiatric:         Mood and Affect: Mood and affect normal.         Behavior: Behavior normal.         Thought Content: Thought content normal.         Cognition and Memory: Memory normal.         Judgment: Judgment normal.         Assessment/Plan   Assessment:     1. Gastroesophageal reflux disease without esophagitis    2. Chronic idiopathic constipation      Plan:   Trial of FIberChoice with prebiotic  Schedule EGD and colonoscopy        Beau Cain M.D.  [facility]  [address] [place] [address]  Office: [phone]

## 2020-10-19 ENCOUNTER — TRANSCRIBE ORDERS (OUTPATIENT)
Dept: SLEEP MEDICINE | Facility: HOSPITAL | Age: 45
End: 2020-10-19

## 2020-10-19 DIAGNOSIS — Z01.818 OTHER SPECIFIED PRE-OPERATIVE EXAMINATION: Primary | ICD-10-CM

## 2020-10-21 ENCOUNTER — APPOINTMENT (OUTPATIENT)
Dept: INFUSION THERAPY | Facility: HOSPITAL | Age: 45
End: 2020-10-21

## 2020-10-25 ENCOUNTER — E-VISIT (OUTPATIENT)
Dept: FAMILY MEDICINE CLINIC | Facility: TELEHEALTH | Age: 45
End: 2020-10-25

## 2020-10-25 DIAGNOSIS — N89.8 VAGINAL DISCHARGE: Primary | ICD-10-CM

## 2020-10-25 PROCEDURE — 99422 OL DIG E/M SVC 11-20 MIN: CPT | Performed by: NURSE PRACTITIONER

## 2020-10-26 RX ORDER — METRONIDAZOLE 500 MG/1
500 TABLET ORAL 2 TIMES DAILY
Qty: 14 TABLET | Refills: 0 | Status: SHIPPED | OUTPATIENT
Start: 2020-10-26 | End: 2020-11-02

## 2020-10-26 NOTE — PROGRESS NOTES
Vianney Villarreal    1975  [id number]    I have reviewed the e-Visit questionnaire and patient's answers, my assessment and plan are as follows:    Questionnaire:     --Symptoms: No Covid symptoms   --In the last 14 day, have you had contact with anyone who is ill, has shown any of the symptoms listed above and/or has been diagnosed with 2019 Novel Coronavirus? This includes any immediate household member but excludes any patients with whom you have been in contact within your normal work duties wearing proper PPE, if you are a healthcare worker: None       HPI  Vianney is a 46 yo female that has complaints of vaginal itching and discharge. Reports the discharge is white, milky with a foul smelling odor. Reports its a fishy odor. Denies any pain with urination. Denies fever. Denies sexual contact with a new partner. Denies any recent antibiotic use. Reports she has had a bacterial vaginal infection in the past. Denies having any sores in the vaginal area.     Review of Systems - History obtained from chart review and the patient  General ROS: negative for - fever  Respiratory ROS: negative for - cough  Cardiovascular ROS: negative for - shortness of breath  Gastrointestinal ROS: negative for - nausea/vomiting  Genito-Urinary ROS: positive for - vulvar/vaginal symptoms      Diagnoses and all orders for this visit:    1. Vaginal discharge (Primary)    Other orders  -     metroNIDAZOLE (Flagyl) 500 MG tablet; Take 1 tablet by mouth 2 (Two) Times a Day for 7 days.  Dispense: 14 tablet; Refill: 0    if symptoms persist followup with primary/GYN for in person exam.     Any medications prescribed have been sent electronically to   [facility] - [place], [place] - [address] [place] [address] - [phone]  - [phone] [address] [address]  Phone: [phone] Fax: [phone]    [facility] - [place], [place] - [address] [place] [address] - [phone]  -  [phone] FX  [address] [facility] [address]  Phone: [phone] Fax: [phone]    [id number] - [place], [place] - [address] [facility] - [phone]  - [phone] FX  [address] [facility] [address]  Phone: [phone] Fax: [phone]      Time Documentation  Counseled patient  Counseling topics: diagnosis, treatment options, follow up plan and return instructions  Total encounter time: counseling time more than 50% of visit: 15 minutes        Julianne Aaron, CHANDRAKANT  10/26/20  21:26 EDT

## 2020-10-26 NOTE — PATIENT INSTRUCTIONS
Bacterial Vaginosis    Bacterial vaginosis is a vaginal infection that occurs when the normal balance of bacteria in the vagina is disrupted. It results from an overgrowth of certain bacteria. This is the most common vaginal infection among women ages 15-44.  Because bacterial vaginosis increases your risk for STIs (sexually transmitted infections), getting treated can help reduce your risk for chlamydia, gonorrhea, herpes, and HIV (human immunodeficiency virus). Treatment is also important for preventing complications in pregnant women, because this condition can cause an early (premature) delivery.  What are the causes?  This condition is caused by an increase in harmful bacteria that are normally present in small amounts in the vagina. However, the reason that the condition develops is not fully understood.  What increases the risk?  The following factors may make you more likely to develop this condition:  · Having a new sexual partner or multiple sexual partners.  · Having unprotected sex.  · Douching.  · Having an intrauterine device (IUD).  · Smoking.  · Drug and alcohol abuse.  · Taking certain antibiotic medicines.  · Being pregnant.  You cannot get bacterial vaginosis from toilet seats, bedding, swimming pools, or contact with objects around you.  What are the signs or symptoms?  Symptoms of this condition include:  · Grey or white vaginal discharge. The discharge can also be watery or foamy.  · A fish-like odor with discharge, especially after sexual intercourse or during menstruation.  · Itching in and around the vagina.  · Burning or pain with urination.  Some women with bacterial vaginosis have no signs or symptoms.  How is this diagnosed?  This condition is diagnosed based on:  · Your medical history.  · A physical exam of the vagina.  · Testing a sample of vaginal fluid under a microscope to look for a large amount of bad bacteria or abnormal cells. Your health care provider may use a cotton swab or  a small wooden spatula to collect the sample.  How is this treated?  This condition is treated with antibiotics. These may be given as a pill, a vaginal cream, or a medicine that is put into the vagina (suppository). If the condition comes back after treatment, a second round of antibiotics may be needed.  Follow these instructions at home:  Medicines  · Take over-the-counter and prescription medicines only as told by your health care provider.  · Take or use your antibiotic as told by your health care provider. Do not stop taking or using the antibiotic even if you start to feel better.  General instructions  · If you have a female sexual partner, tell her that you have a vaginal infection. She should see her health care provider and be treated if she has symptoms. If you have a male sexual partner, he does not need treatment.  · During treatment:  ? Avoid sexual activity until you finish treatment.  ? Do not douche.  ? Avoid alcohol as directed by your health care provider.  ? Avoid breastfeeding as directed by your health care provider.  · Drink enough water and fluids to keep your urine clear or pale yellow.  · Keep the area around your vagina and rectum clean.  ? Wash the area daily with warm water.  ? Wipe yourself from front to back after using the toilet.  · Keep all follow-up visits as told by your health care provider. This is important.  How is this prevented?  · Do not douche.  · Wash the outside of your vagina with warm water only.  · Use protection when having sex. This includes latex condoms and dental dams.  · Limit how many sexual partners you have. To help prevent bacterial vaginosis, it is best to have sex with just one partner (monogamous).  · Make sure you and your sexual partner are tested for STIs.  · Wear cotton or cotton-lined underwear.  · Avoid wearing tight pants and pantyhose, especially during summer.  · Limit the amount of alcohol that you drink.  · Do not use any products that contain  nicotine or tobacco, such as cigarettes and e-cigarettes. If you need help quitting, ask your health care provider.  · Do not use illegal drugs.  Where to find more information  · Centers for Disease Control and Prevention: www.cdc.gov/std  · American Sexual Health Association ([place]): www.ashastd.org  · U.S. Department of Health and Human Services, Office on Women's Health: www.womenshealth.gov/ or https://www.womenshealth.gov/a-z-topics/bacterial-vaginosis  Contact a health care provider if:  · Your symptoms do not improve, even after treatment.  · You have more discharge or pain when urinating.  · You have a fever.  · You have pain in your abdomen.  · You have pain during sex.  · You have vaginal bleeding between periods.  Summary  · Bacterial vaginosis is a vaginal infection that occurs when the normal balance of bacteria in the vagina is disrupted.  · Because bacterial vaginosis increases your risk for STIs (sexually transmitted infections), getting treated can help reduce your risk for chlamydia, gonorrhea, herpes, and HIV (human immunodeficiency virus). Treatment is also important for preventing complications in pregnant women, because the condition can cause an early (premature) delivery.  · This condition is treated with antibiotic medicines. These may be given as a pill, a vaginal cream, or a medicine that is put into the vagina (suppository).  This information is not intended to replace advice given to you by your health care provider. Make sure you discuss any questions you have with your health care provider.  Document Released: 12/18/2006 Document Revised: 11/30/2018 Document Reviewed: 09/02/2017  Elsevier Patient Education © 2020 [employer]

## 2020-10-27 ENCOUNTER — LAB (OUTPATIENT)
Dept: LAB | Facility: HOSPITAL | Age: 45
End: 2020-10-27

## 2020-10-27 DIAGNOSIS — Z01.818 OTHER SPECIFIED PRE-OPERATIVE EXAMINATION: ICD-10-CM

## 2020-10-27 PROCEDURE — U0004 COV-19 TEST NON-CDC HGH THRU: HCPCS | Performed by: INTERNAL MEDICINE

## 2020-10-27 PROCEDURE — C9803 HOPD COVID-19 SPEC COLLECT: HCPCS

## 2020-10-28 LAB — SARS-COV-2 RNA RESP QL NAA+PROBE: NOT DETECTED

## 2020-11-09 RX ORDER — FLUCONAZOLE 150 MG/1
150 TABLET ORAL ONCE
Qty: 1 TABLET | Refills: 0 | Status: SHIPPED | OUTPATIENT
Start: 2020-11-09 | End: 2020-11-18 | Stop reason: SDUPTHER

## 2020-11-18 RX ORDER — FLUCONAZOLE 150 MG/1
150 TABLET ORAL ONCE
Qty: 1 TABLET | Refills: 0 | Status: SHIPPED | OUTPATIENT
Start: 2020-11-18 | End: 2020-11-18

## 2020-11-19 ENCOUNTER — OFFICE VISIT (OUTPATIENT)
Dept: NEUROLOGY | Facility: CLINIC | Age: 45
End: 2020-11-19

## 2020-11-19 VITALS
SYSTOLIC BLOOD PRESSURE: 130 MMHG | DIASTOLIC BLOOD PRESSURE: 80 MMHG | OXYGEN SATURATION: 99 % | HEART RATE: 80 BPM | HEIGHT: 65 IN | WEIGHT: 233 LBS | BODY MASS INDEX: 38.82 KG/M2

## 2020-11-19 DIAGNOSIS — M79.602 PARESTHESIA AND PAIN OF BOTH UPPER EXTREMITIES: ICD-10-CM

## 2020-11-19 DIAGNOSIS — G61.0 GBS (GUILLAIN BARRE SYNDROME) (HCC): Primary | ICD-10-CM

## 2020-11-19 DIAGNOSIS — J45.909 MODERATE ASTHMA WITHOUT COMPLICATION, UNSPECIFIED WHETHER PERSISTENT: ICD-10-CM

## 2020-11-19 DIAGNOSIS — M79.601 PARESTHESIA AND PAIN OF BOTH UPPER EXTREMITIES: ICD-10-CM

## 2020-11-19 DIAGNOSIS — R20.2 PARESTHESIA AND PAIN OF BOTH UPPER EXTREMITIES: ICD-10-CM

## 2020-11-19 DIAGNOSIS — E66.9 OBESITY (BMI 35.0-39.9 WITHOUT COMORBIDITY): ICD-10-CM

## 2020-11-19 PROCEDURE — 99213 OFFICE O/P EST LOW 20 MIN: CPT | Performed by: NURSE PRACTITIONER

## 2020-11-19 NOTE — PROGRESS NOTES
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  right-handed female with history of degenerative disc disease, asthma, obesity (BMI 38.77) who I am seeing today in follow-up for bilateral upper and lower extremity paresthesias with etiology thought to be secondary to [place] Barré syndrome versus CIDP; favor Dorothy Barré.    Patient originally presented to [facility] August 2020 due to complaint of bilateral upper and lower extremity numbness and tingling which have been present for approximately 5 weeks and continued to worsen.  She also had associated stomach spasm radiating to her back and the feeling of heaviness of her body when ambulating.  Numbness began in lower extremities and spread in a sending pattern.  She had initially presented to outside facility; [facility] and had MRI of the brain which was unremarkable therefore she was discharged to home.  She returned with same symptoms along with associated shortness of breath and was transferred to [facility] for further work-up.  During her inpatient stay she received IVIG x5 days.  She was initially started on gabapentin to help with discomfort but this had to be discontinued due to \"bad thoughts\".  She denies suicidal and/or homicidal ideations but she felt unsafe alone therefore her daughter stayed with her through that weekend and patient was later followed up in the clinic where her mood had already began to improve due to the discontinuation of gabapentin.  At that time, she continued to report some mild generalized discomfort in bilateral flank, tips of fingers and toe numbness and tingling.  Rx alternatives topical pain cream was prescribed due to prior side effects to gabapentin.  Today, she reports that she only used topical cream 1 time due to concern for side effects.  EMG completed by Dr. Samir Arrington which was abnormal with evidence of prolonged distal motor latency " "noted ((50%) blocked in any nerves tested.  Etiology of abnormal study thought to be consistent with peripheral neuropathy and concerning for CIDP.  There were some minor nail changes noted on left C6-7 radiculopathy and left S1 radiculopathy unable to be entirely excluded.    To date, patient continues to report improvement with mild residual numbness in bilateral fingertips left greater than right and left lateral thigh with mild discomfort with crossing legs otherwise no weakness and/or other residual deficits.  She reports her mood is good and she no longer has any \"bad thoughts\" and has no SI/HI thoughts.  Patient questioning whether to take flu shot and/or Covid injection this year which has been previously discouraged by neurology team.  I will discuss further with Dr. Samir Arrington and notify patient but from my personal opinion I think given her history that she should forego these injections this season and reevaluate administration next season.  Discussed flu/Covid prevention methods and proper handwashing techniques.  I will plan to repeat EMG in 6 months to further evaluate improvement.  Patient agreeable to this plan.        Past Medical History:   Diagnosis Date   • Acid reflux    • Asthma    • GERD (gastroesophageal reflux disease)          Past Surgical History:   Procedure Laterality Date   • ENDOSCOPY N/A 2014   • ESOPHAGEAL DILATATION N/A 2014   • TUBAL COAGULATION LAPAROSCOPIC Bilateral 7/15/2019    Procedure: LAPAROSCOPIC BILATERAL TUBAL LIGATION WITH FILSHIE CLIPS;  Surgeon: Jorgito Hallman MD;  Location: [facility];  Service: Obstetrics/Gynecology           Current Outpatient Medications:   •  BREO ELLIPTA 200-25 MCG/INH inhaler, Inhale 1 puff Every Morning., Disp: 2 inhaler, Rfl: 3  •  cetirizine (zyrTEC) 5 MG tablet, Take 5 mg by mouth Every Night., Disp: , Rfl:   •  EPIPEN 2-ANYI 0.3 MG/0.3ML solution auto-injector injection, Inject 0.3 mL into the appropriate muscle as directed by " prescriber Take As Directed. TAKES ALLERGY SHOTS, Disp: 1 each, Rfl: 2  •  pantoprazole (PROTONIX) 40 MG EC tablet, TAKE 1 TABLET DAILY (NEED APPOINTMENT IN JULY), Disp: 90 tablet, Rfl: 3  •  SPIRIVA RESPIMAT 1.25 MCG/ACT aerosol solution, Inhale 2 puffs Every Morning., Disp: , Rfl:   •  VENTOLIN  (90 Base) MCG/ACT inhaler, INHALE 2 PUFFS BY MOUTH EVERY FOUR HOURS AS NEEDED FOR WHEEZING, Disp: 18 g, Rfl: 0  •  B Complex Vitamins (B COMPLEX 50 PO), Take  by mouth., Disp: , Rfl:   •  Calcium Carbonate Antacid (SLADE-SELTZER ANTACID PO), , Disp: , Rfl:   •  docusate sodium 100 MG capsule, Take 100 mg by mouth 2 (Two) Times a Day As Needed for Constipation., Disp: , Rfl:   •  montelukast (SINGULAIR) 10 MG tablet, Take 1 tablet by mouth Every Night. Hold until follow up with allergist, Disp: , Rfl:   •  polyethylene glycol (polyethylene glycol) 17 g packet, Take 17 g by mouth Daily., Disp: , Rfl:       Family History   Problem Relation Age of Onset   • No Known Problems Mother    • No Known Problems Father    • Malig Hyperthermia Neg Hx          Social History     Socioeconomic History   • Marital status: Single     Spouse name: Not on file   • Number of children: Not on file   • Years of education: Not on file   • Highest education level: Not on file   Occupational History   • Occupation: assemby worker     Comment: working full time   Tobacco Use   • Smoking status: Never Smoker   • Smokeless tobacco: Never Used   Substance and Sexual Activity   • Alcohol use: No   • Drug use: No   • Sexual activity: Defer         Allergies   Allergen Reactions   • Gabapentin Other (See Comments)     SI/HI ideations          Pain Scale:0/10        ROS:  Review of Systems   Constitutional: Negative for activity change, appetite change and unexpected weight change.   HENT: Negative for facial swelling, trouble swallowing and voice change.    Eyes: Negative for photophobia, pain and visual disturbance.   Respiratory: Negative for  "chest tightness, shortness of breath and wheezing.    Cardiovascular: Negative for chest pain, palpitations and leg swelling.   Gastrointestinal: Negative for abdominal pain, nausea and vomiting.   Endocrine: Negative for polydipsia and polyphagia.   Musculoskeletal: Negative for arthralgias, back pain, gait problem, joint swelling, myalgias, neck pain and neck stiffness.   Neurological: Negative for dizziness, tremors, seizures, syncope, facial asymmetry, speech difficulty, weakness, light-headedness, numbness and headaches.   Hematological: Does not bruise/bleed easily.   Psychiatric/Behavioral: Negative for agitation, behavioral problems, confusion, decreased concentration, dysphoric mood, hallucinations, self-injury, sleep disturbance and suicidal ideas. The patient is not nervous/anxious and is not hyperactive.            Physical Exam:  Vitals:    11/19/20 0859   BP: 130/80   BP Location: Left arm   Patient Position: Sitting   Cuff Size: Adult   Pulse: 80   SpO2: 99%   Weight: 106 kg (233 lb)   Height: 165.1 cm (65\")     Body mass index is 38.77 kg/m².    Physical Exam    Physical Examination: NIHSS: 0     mRS: 0  General Appearance:           Well developed, well nourished, well groomed, alert, and cooperative.  HEENT:            Normocephalic.   Neck and Spine:         Normal range of motion.  Normal alignment. No mass or tenderness. No bruits.  Cardiac:                                 Regular rate and rhythm. No murmurs.  Peripheral Vasculature:       Radial and pedal pulses are equal and symmetric.  Extremities:                           No edema or deformities. Normal joint ROM.  Skin:                                       No rashes or birth marks.     Neurological examination:  Higher Integrative  Function:         Oriented to time, place and person. Normal registration, recall, attention span and concentration. Normal language including comprehension, spontaneous speech, repetition, reading, writing, " naming and vocabulary. No neglect with normal visual-spatial function and construction. Normal fund of knowledge and higher integrative function.  CN II:    Pupils are equal, round, and reactive to light. Normal visual acuity and visual fields.    CN III IV VI:      Extraocular movements are full without nystagmus.   CN V:   Normal facial sensation and strength of muscles of mastication.  CN VII:             Facial movements are symmetric. No weakness.  CN VIII:                                   Auditory acuity is normal.  CN IX & X:                              Symmetric palatal movement.  CN XI:  Sternocleidomastoid and trapezius are normal.  No weakness.  CN XII:                                    The tongue is midline.  No atrophy or fasciculations.  Motor:  Normal muscle strength, bulk and tone in upper and lower extremities.  No fasciculations, rigidity, spasticity, or abnormal movements.  Reflexes:         Plantar responses are flexor.  Sensation:       Normal to light touch; except decreased sensation to light touch in fingers and toes   Station and Gait:        Normal gait and station.    Coordination:                        Finger to nose test shows no dysmetria.                Results:      Lab Results   Component Value Date    GLUCOSE 108 (H) 09/02/2020    BUN 9 09/02/2020    CREATININE 0.66 09/02/2020    EGFRIFAFRI 118 09/02/2020    BCR 13.6 09/02/2020    CO2 24.1 09/02/2020    CALCIUM 8.8 09/02/2020    ALBUMIN 3.70 09/02/2020    AST 17 09/02/2020    ALT 21 09/02/2020       Lab Results   Component Value Date    WBC 3.02 (L) 09/04/2020    HGB 11.3 (L) 09/04/2020    HCT 34.3 09/04/2020    MCV 93.5 09/04/2020     09/04/2020         .  Lab Results   Component Value Date    RPR Non-Reactive 08/29/2020         Lab Results   Component Value Date    TSH 0.984 08/29/2020         Lab Results   Component Value Date    XBYFVIZF15 842 08/29/2020         No results found for: FOLATE      Lab Results    Component Value Date    HGBA1C 5.20 08/11/2020         Impression:  1.  Myasthenia gravis versus CIDP; bilateral upper and lower extremity paresthesias  2.  Abnormal EMG  3.  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 Obesity (BMI 35.0-39.9 without comorbidity)    3. Paresthesia and pain of both upper extremities    4. Moderate asthma without complication, unspecified whether persistent                                    Dictated utilizing Dragon dictation.

## 2021-02-23 ENCOUNTER — APPOINTMENT (OUTPATIENT)
Dept: ULTRASOUND IMAGING | Facility: HOSPITAL | Age: 46
End: 2021-02-23

## 2021-03-02 ENCOUNTER — APPOINTMENT (OUTPATIENT)
Dept: ULTRASOUND IMAGING | Facility: HOSPITAL | Age: 46
End: 2021-03-02

## 2021-03-15 ENCOUNTER — OFFICE VISIT (OUTPATIENT)
Dept: FAMILY MEDICINE CLINIC | Facility: CLINIC | Age: 46
End: 2021-03-15

## 2021-03-15 VITALS
HEIGHT: 65 IN | BODY MASS INDEX: 38.32 KG/M2 | SYSTOLIC BLOOD PRESSURE: 122 MMHG | DIASTOLIC BLOOD PRESSURE: 78 MMHG | OXYGEN SATURATION: 100 % | HEART RATE: 68 BPM | TEMPERATURE: 97.1 F | WEIGHT: 230 LBS

## 2021-03-15 DIAGNOSIS — N89.8 VAGINAL IRRITATION: ICD-10-CM

## 2021-03-15 DIAGNOSIS — R35.0 URINARY FREQUENCY: Primary | ICD-10-CM

## 2021-03-15 DIAGNOSIS — Z11.3 SCREEN FOR STD (SEXUALLY TRANSMITTED DISEASE): ICD-10-CM

## 2021-03-15 LAB
BACTERIA UR QL AUTO: NORMAL /HPF
BILIRUB UR QL STRIP: NEGATIVE
CLARITY UR: CLEAR
CLUE CELLS SPEC QL WET PREP: NORMAL
COLOR UR: YELLOW
GLUCOSE UR STRIP-MCNC: NEGATIVE MG/DL
HGB UR QL STRIP.AUTO: NEGATIVE
HIV1+2 AB SER QL: NORMAL
HYDATID CYST SPEC WET PREP: NORMAL
KETONES UR QL STRIP: NEGATIVE
LEUKOCYTE ESTERASE UR QL STRIP.AUTO: NEGATIVE
NITRITE UR QL STRIP: NEGATIVE
PH UR STRIP.AUTO: 7 [PH] (ref 4.6–8)
PROT UR QL STRIP: NEGATIVE
RBC # UR: NORMAL /HPF
REF LAB TEST METHOD: NORMAL
SP GR UR STRIP: 1.01 (ref 1–1.03)
SQUAMOUS #/AREA URNS HPF: NORMAL /HPF
T VAGINALIS SPEC QL WET PREP: NORMAL
UROBILINOGEN UR QL STRIP: NORMAL
WBC SPEC QL WET PREP: NORMAL
WBC UR QL AUTO: NORMAL /HPF
YEAST GENITAL QL WET PREP: NORMAL

## 2021-03-15 PROCEDURE — 36415 COLL VENOUS BLD VENIPUNCTURE: CPT | Performed by: NURSE PRACTITIONER

## 2021-03-15 PROCEDURE — 87210 SMEAR WET MOUNT SALINE/INK: CPT | Performed by: NURSE PRACTITIONER

## 2021-03-15 PROCEDURE — 87086 URINE CULTURE/COLONY COUNT: CPT | Performed by: NURSE PRACTITIONER

## 2021-03-15 PROCEDURE — 99213 OFFICE O/P EST LOW 20 MIN: CPT | Performed by: NURSE PRACTITIONER

## 2021-03-15 PROCEDURE — G0432 EIA HIV-1/HIV-2 SCREEN: HCPCS | Performed by: NURSE PRACTITIONER

## 2021-03-15 PROCEDURE — 81001 URINALYSIS AUTO W/SCOPE: CPT | Performed by: NURSE PRACTITIONER

## 2021-03-15 PROCEDURE — 86592 SYPHILIS TEST NON-TREP QUAL: CPT | Performed by: NURSE PRACTITIONER

## 2021-03-15 RX ORDER — FLUCONAZOLE 150 MG/1
TABLET ORAL
Qty: 2 TABLET | Refills: 0 | Status: SHIPPED | OUTPATIENT
Start: 2021-03-15 | End: 2022-02-09

## 2021-03-15 NOTE — PATIENT INSTRUCTIONS
UA today will send for culture and call with results.   Std screen today, encouraged protection with intercourse every encounter.   Wet prep today.   Drink plenty of H2O, wipe front to back after urination.   Avoid bladder irritants- coffee, caffeine, carbonation.  Diflucan 150mg x1 today then repeat again in 3 days.   Patient agrees with plan of care and understands instructions. Call if worsening symptoms or any problems or concerns.

## 2021-03-15 NOTE — PROGRESS NOTES
"Chief Complaint  Abdominal Pain (low abdomen cramping, feels like can't emty bladder fully)    Subjective          Vianney Villarreal presents to [facility]  History of Present Illness  C/o incomplete emptying, lower abd pain, urinary frequency, states symptoms started about 2-3 weeks ago, she has had to hold urine while at work, waiting for someone to relieve her at work, she does have vaginal itching, states vaginal d/c clear, denies dysuria, she denies fever, she denies lower back pain, she denies vaginal bleeding. She does have menses, she had tubal, states LMP 1 month ago. She did have new partner, denies any known exposure.         Objective   Vital Signs:   /78 (BP Location: Left arm, Patient Position: Sitting, Cuff Size: Adult)   Pulse 68   Temp 97.1 °F (36.2 °C) (Infrared)   Ht 165.1 cm (65\")   Wt 104 kg (230 lb)   SpO2 100%   BMI 38.27 kg/m²     Physical Exam  Vitals and nursing note reviewed.   Constitutional:       Appearance: She is well-developed.   HENT:      Head: Normocephalic.   Eyes:      Pupils: Pupils are equal, round, and reactive to light.   Cardiovascular:      Rate and Rhythm: Normal rate and regular rhythm.      Heart sounds: Normal heart sounds.   Pulmonary:      Effort: Pulmonary effort is normal.      Breath sounds: Normal breath sounds.   Genitourinary:     Exam position: Supine.      Labia:         Right: No rash or tenderness.         Left: No rash or tenderness.    Skin:     General: Skin is warm and dry.   Neurological:      Mental Status: She is alert and oriented to person, place, and time.   Psychiatric:         Behavior: Behavior normal.         Judgment: Judgment normal.        Result Review :                 Assessment and Plan    Diagnoses and all orders for this visit:    1. Urinary frequency (Primary)  -     Urinalysis With Microscopic - Urine, Clean Catch  -     Urine Culture - Urine, Urine, Clean Catch  -     Chlamydia trachomatis, " Neisseria gonorrhoeae, PCR - , Urine, Clean Catch  -     HIV-1 / O / 2 Ag / Antibody 4th Generation  -     RPR  -     Wet Prep, Genital - Swab, Vagina    2. Screen for STD (sexually transmitted disease)  -     Chlamydia trachomatis, Neisseria gonorrhoeae, PCR - , Urine, Clean Catch  -     HIV-1 / O / 2 Ag / Antibody 4th Generation  -     RPR  -     Wet Prep, Genital - Swab, Vagina    3. Vaginal irritation  -     Wet Prep, Genital - Swab, Vagina    Other orders  -     Breo Ellipta 200-25 MCG/INH inhaler; Inhale 1 puff Every Morning.  Dispense: 2 each; Refill: 3  -     fluconazole (Diflucan) 150 MG tablet; Take 1 today then repeat again in 3 days.  Dispense: 2 tablet; Refill: 0        Follow Up   Return if symptoms worsen or fail to improve.  Patient was given instructions and counseling regarding her condition or for health maintenance advice. Please see specific information pulled into the AVS if appropriate.       Answers for HPI/ROS submitted by the patient on 3/12/2021  Please describe your symptoms.: I keep feeling like I have to keep urinating and my bladder  feels like a heaviness before I go to the bathroom. Sometimes I have to keep pushing to get all the urine out.  Have you had these symptoms before?: No  How long have you been having these symptoms?: 1-2 weeks  What is the primary reason for your visit?: Other    UA today will send for culture and call with results.   Std screen today, encouraged protection with intercourse every encounter.   Wet prep today.   Drink plenty of H2O, wipe front to back after urination.   Avoid bladder irritants- coffee, caffeine, carbonation.  Diflucan 150mg x1 today then repeat again in 3 days.   Patient agrees with plan of care and understands instructions. Call if worsening symptoms or any problems or concerns.

## 2021-03-16 LAB
BACTERIA SPEC AEROBE CULT: NORMAL
C TRACH RRNA SPEC QL NAA+PROBE: NEGATIVE
N GONORRHOEA RRNA SPEC QL NAA+PROBE: NEGATIVE
RPR SER QL: NORMAL

## 2021-04-02 ENCOUNTER — BULK ORDERING (OUTPATIENT)
Dept: CASE MANAGEMENT | Facility: OTHER | Age: 46
End: 2021-04-02

## 2021-04-02 DIAGNOSIS — Z23 IMMUNIZATION DUE: ICD-10-CM

## 2021-04-13 RX ORDER — ALBUTEROL SULFATE 90 UG/1
AEROSOL, METERED RESPIRATORY (INHALATION)
Qty: 18 G | Refills: 0 | Status: SHIPPED | OUTPATIENT
Start: 2021-04-13 | End: 2022-10-11 | Stop reason: SDUPTHER

## 2021-05-03 RX ORDER — PANTOPRAZOLE SODIUM 40 MG/1
TABLET, DELAYED RELEASE ORAL
Qty: 90 TABLET | Refills: 3 | Status: SHIPPED | OUTPATIENT
Start: 2021-05-03 | End: 2022-03-14 | Stop reason: SDUPTHER

## 2021-07-23 ENCOUNTER — TELEPHONE (OUTPATIENT)
Dept: NEUROLOGY | Facility: CLINIC | Age: 46
End: 2021-07-23

## 2021-07-23 NOTE — TELEPHONE ENCOUNTER
Caller: PATIENT    Relationship: SELF    Best call back number: [phone]    What was the call regarding: PT WANTS TO ASK BRODERICK CHRISTIANSEN IF SHE IS OKAY TO GET THE COVID-19 VACCINE NOW?     ALSO- PT WOULD LIKE TO SCHED AN APPT AS SHE HAS MANY OTHER QUESTIONS ALSO.    PLEASE CALL PT BACK TO [facility]. THANKS

## 2021-09-08 ENCOUNTER — TELEPHONE (OUTPATIENT)
Dept: FAMILY MEDICINE CLINIC | Facility: CLINIC | Age: 46
End: 2021-09-08

## 2021-09-08 NOTE — TELEPHONE ENCOUNTER
We will cover you as long as recommended by the CDC guidelines and based on how you feel.  If you do not feel well on September 11 and we will cover you beyond that.

## 2021-09-08 NOTE — TELEPHONE ENCOUNTER
PATIENT CALLED AND STATED SHE TESTED POSITIVE FOR COVID ON 09/02 BUT HAD SYMPTOMS ON 09/01. PATIENT IS STILL HAVING BAD HEADACHES, BUT HER EMPLOYER IS WANTING HER BACK Saturday 09/11, BUT NEEDS CLEARANCE FROM HER DR TO RETURN TO WORK. PATIENT IS WORRIED SHE WONT BE FEELING ANY BETTER BY Friday AND WOULD LIKE TO KNOW IF DR CHOUDHURY WILL WRITE A WORK EXCUSE PAST QUARANTINE TIMEFRAME     PLEASE [facility]     [phone]

## 2022-02-09 ENCOUNTER — OFFICE VISIT (OUTPATIENT)
Dept: FAMILY MEDICINE CLINIC | Facility: CLINIC | Age: 47
End: 2022-02-09

## 2022-02-09 VITALS
WEIGHT: 227 LBS | DIASTOLIC BLOOD PRESSURE: 70 MMHG | TEMPERATURE: 97.7 F | HEART RATE: 78 BPM | HEIGHT: 65 IN | OXYGEN SATURATION: 98 % | RESPIRATION RATE: 18 BRPM | BODY MASS INDEX: 37.82 KG/M2 | SYSTOLIC BLOOD PRESSURE: 128 MMHG

## 2022-02-09 DIAGNOSIS — K21.9 GASTROESOPHAGEAL REFLUX DISEASE WITHOUT ESOPHAGITIS: ICD-10-CM

## 2022-02-09 DIAGNOSIS — Z00.00 HEALTHCARE MAINTENANCE: Primary | ICD-10-CM

## 2022-02-09 DIAGNOSIS — J30.2 SEASONAL ALLERGIES: ICD-10-CM

## 2022-02-09 DIAGNOSIS — Z72.51 UNPROTECTED SEX: ICD-10-CM

## 2022-02-09 DIAGNOSIS — N89.8 VAGINAL DISCHARGE: ICD-10-CM

## 2022-02-09 DIAGNOSIS — J45.909 MODERATE ASTHMA, UNSPECIFIED WHETHER COMPLICATED, UNSPECIFIED WHETHER PERSISTENT: ICD-10-CM

## 2022-02-09 LAB
ALBUMIN SERPL-MCNC: 4 G/DL (ref 3.5–5.2)
ALBUMIN/GLOB SERPL: 1.3 G/DL
ALP SERPL-CCNC: 76 U/L (ref 39–117)
ALT SERPL W P-5'-P-CCNC: 14 U/L (ref 1–33)
ANION GAP SERPL CALCULATED.3IONS-SCNC: 8.5 MMOL/L (ref 5–15)
AST SERPL-CCNC: 21 U/L (ref 1–32)
BILIRUB SERPL-MCNC: 1.3 MG/DL (ref 0–1.2)
BUN SERPL-MCNC: 7 MG/DL (ref 6–20)
BUN/CREAT SERPL: 9.6 (ref 7–25)
CALCIUM SPEC-SCNC: 9 MG/DL (ref 8.6–10.5)
CHLORIDE SERPL-SCNC: 105 MMOL/L (ref 98–107)
CHOLEST SERPL-MCNC: 196 MG/DL (ref 0–200)
CLUE CELLS SPEC QL WET PREP: NORMAL
CO2 SERPL-SCNC: 25.5 MMOL/L (ref 22–29)
CREAT SERPL-MCNC: 0.73 MG/DL (ref 0.57–1)
ERYTHROCYTE [DISTWIDTH] IN BLOOD BY AUTOMATED COUNT: 12.7 % (ref 12.3–15.4)
GFR SERPL CREATININE-BSD FRML MDRD: 105 ML/MIN/1.73
GLOBULIN UR ELPH-MCNC: 3 GM/DL
GLUCOSE SERPL-MCNC: 88 MG/DL (ref 65–99)
HAV IGM SERPL QL IA: NORMAL
HBV CORE IGM SERPL QL IA: NORMAL
HBV SURFACE AG SERPL QL IA: NORMAL
HCT VFR BLD AUTO: 37 % (ref 34–46.6)
HCV AB SER DONR QL: NORMAL
HCV AB SER DONR QL: NORMAL
HDLC SERPL-MCNC: 81 MG/DL (ref 40–60)
HGB BLD-MCNC: 12.1 G/DL (ref 12–15.9)
HIV1+2 AB SER QL: NORMAL
HYDATID CYST SPEC WET PREP: NORMAL
LDLC SERPL CALC-MCNC: 104 MG/DL (ref 0–100)
LDLC/HDLC SERPL: 1.28 {RATIO}
LYMPHOCYTES # BLD AUTO: 1.8 10*3/MM3 (ref 0.7–3.1)
LYMPHOCYTES NFR BLD AUTO: 42.9 % (ref 19.6–45.3)
MCH RBC QN AUTO: 30.2 PG (ref 26.6–33)
MCHC RBC AUTO-ENTMCNC: 32.9 G/DL (ref 31.5–35.7)
MCV RBC AUTO: 92 FL (ref 79–97)
MONOCYTES # BLD AUTO: 0.3 10*3/MM3 (ref 0.1–0.9)
MONOCYTES NFR BLD AUTO: 8.5 % (ref 5–12)
NEUTROPHILS NFR BLD AUTO: 2 10*3/MM3 (ref 1.7–7)
NEUTROPHILS NFR BLD AUTO: 48.6 % (ref 42.7–76)
PLATELET # BLD AUTO: 297 10*3/MM3 (ref 140–450)
PMV BLD AUTO: 7.8 FL (ref 6–12)
POTASSIUM SERPL-SCNC: 4.3 MMOL/L (ref 3.5–5.2)
PROT SERPL-MCNC: 7 G/DL (ref 6–8.5)
RBC # BLD AUTO: 4.02 10*6/MM3 (ref 3.77–5.28)
RPR SER QL: NORMAL
SODIUM SERPL-SCNC: 139 MMOL/L (ref 136–145)
T VAGINALIS SPEC QL WET PREP: NORMAL
TRIGL SERPL-MCNC: 57 MG/DL (ref 0–150)
TSH SERPL DL<=0.05 MIU/L-ACNC: 0.78 UIU/ML (ref 0.27–4.2)
VLDLC SERPL-MCNC: 11 MG/DL (ref 5–40)
WBC NRBC COR # BLD: 4.1 10*3/MM3 (ref 3.4–10.8)
WBC SPEC QL WET PREP: NORMAL
YEAST GENITAL QL WET PREP: NORMAL

## 2022-02-09 PROCEDURE — 80061 LIPID PANEL: CPT | Performed by: NURSE PRACTITIONER

## 2022-02-09 PROCEDURE — 87210 SMEAR WET MOUNT SALINE/INK: CPT | Performed by: NURSE PRACTITIONER

## 2022-02-09 PROCEDURE — 80050 GENERAL HEALTH PANEL: CPT | Performed by: NURSE PRACTITIONER

## 2022-02-09 PROCEDURE — 86803 HEPATITIS C AB TEST: CPT | Performed by: NURSE PRACTITIONER

## 2022-02-09 PROCEDURE — 99396 PREV VISIT EST AGE 40-64: CPT | Performed by: NURSE PRACTITIONER

## 2022-02-09 PROCEDURE — 86592 SYPHILIS TEST NON-TREP QUAL: CPT | Performed by: NURSE PRACTITIONER

## 2022-02-09 PROCEDURE — 99214 OFFICE O/P EST MOD 30 MIN: CPT | Performed by: NURSE PRACTITIONER

## 2022-02-09 PROCEDURE — 80074 ACUTE HEPATITIS PANEL: CPT | Performed by: NURSE PRACTITIONER

## 2022-02-09 PROCEDURE — G0432 EIA HIV-1/HIV-2 SCREEN: HCPCS | Performed by: NURSE PRACTITIONER

## 2022-02-09 RX ORDER — METRONIDAZOLE 500 MG/1
500 TABLET ORAL 2 TIMES DAILY
Qty: 14 TABLET | Refills: 0 | Status: SHIPPED | OUTPATIENT
Start: 2022-02-09 | End: 2022-02-10 | Stop reason: SDUPTHER

## 2022-02-09 NOTE — PROGRESS NOTES
"Chief Complaint  Vaginal Discharge    Subjective          Vianney Villarreal presents to [facility]  History of Present Illness   46-year-old -American female, patient of Dr. Almazan, new to me, presenting for annual exam and labs with complaints of bloody vaginal discharge with odor, appointment with OB/GYN is not until March, denies vaginal itching, abdominal pain or pain with intercourse, she reports recent unprotected sex on multiple occasions.  With moderate asthma, takes Breo Ellipta 200/25mcg, Spiriva 1.25mcg and Ventolin 90mcg.  With GERD, controlled with Protonix 40 mg.  With seasonal allergies, takes Zyrtec 5 mg. Last , trigly 144, HDL 61,  on 8/11/20. Denies SOA, CP, HA, dizziness or LE edema    Objective   Vital Signs:   /70 (BP Location: Left arm, Patient Position: Sitting)   Pulse 78   Temp 97.7 °F (36.5 °C) (Infrared)   Resp 18   Ht 165.1 cm (65\")   Wt 103 kg (227 lb)   SpO2 98%   BMI 37.77 kg/m²     Physical Exam  Vitals and nursing note reviewed.   Constitutional:       Appearance: She is well-developed.   HENT:      Head: Normocephalic.   Eyes:      Pupils: Pupils are equal, round, and reactive to light.   Cardiovascular:      Rate and Rhythm: Normal rate and regular rhythm.      Heart sounds: Normal heart sounds.   Pulmonary:      Effort: Pulmonary effort is normal.      Breath sounds: Normal breath sounds.   Abdominal:      General: Bowel sounds are normal.      Palpations: Abdomen is soft.   Genitourinary:     Vagina: Vaginal discharge present. No tenderness or lesions.      Comments: Bloody discharge  Musculoskeletal:         General: Normal range of motion.      Cervical back: Normal range of motion and neck supple.   Skin:     General: Skin is warm and dry.      Findings: No rash.   Neurological:      Mental Status: She is alert and oriented to person, place, and time.   Psychiatric:         Behavior: Behavior normal.         Thought " Content: Thought content normal.         Judgment: Judgment normal.        Result Review :                 Assessment and Plan    Diagnoses and all orders for this visit:    1. Healthcare maintenance (Primary)  -     Lipid Panel  -     CBC & Differential  -     Comprehensive Metabolic Panel  -     TSH  -     Hepatitis C Antibody    2. Vaginal discharge  -     Wet Prep, Genital - Swab, Vagina  -     Chlamydia trachomatis, Neisseria gonorrhoeae, Trichomonas vaginalis, PCR - Urine, Urine, Clean Catch  -     HIV-1 / O / 2 Ag / Antibody 4th Generation  -     Hepatitis C RNA, quantitative, PCR (graph); Future  -     Cancel: KOH Prep - Swab, Cervix  -     Trichomonas vaginalis, PCR - , Urine, Clean Catch  -     Cancel: Wet Prep, Genital - Swab, Vagina  -     RPR; Future  -     HSV 1 and 2-Specific Ab, IgG; Future  -     Hepatitis panel, acute; Future  -     Hepatitis C RNA, quantitative, PCR (graph)  -     RPR  -     HSV 1 and 2-Specific Ab, IgG  -     Hepatitis panel, acute    3. Seasonal allergies  -     Lipid Panel  -     CBC & Differential  -     Comprehensive Metabolic Panel  -     TSH    4. Moderate asthma, unspecified whether complicated, unspecified whether persistent  -     Lipid Panel  -     CBC & Differential  -     Comprehensive Metabolic Panel  -     TSH    5. Gastroesophageal reflux disease without esophagitis  -     Lipid Panel  -     CBC & Differential  -     Comprehensive Metabolic Panel  -     TSH    6. Unprotected sex  -     Wet Prep, Genital - Swab, Vagina  -     Chlamydia trachomatis, Neisseria gonorrhoeae, Trichomonas vaginalis, PCR - Urine, Urine, Clean Catch  -     HIV-1 / O / 2 Ag / Antibody 4th Generation  -     Hepatitis C RNA, quantitative, PCR (graph); Future  -     Cancel: KOH Prep - Swab, Cervix  -     Trichomonas vaginalis, PCR - , Urine, Clean Catch  -     Cancel: Wet Prep, Genital - Swab, Vagina  -     RPR; Future  -     HSV 1 and 2-Specific Ab, IgG; Future  -     Hepatitis panel, acute;  Future  -     Hepatitis C RNA, quantitative, PCR (graph)  -     RPR  -     HSV 1 and 2-Specific Ab, IgG  -     Hepatitis panel, acute        Follow Up   Return in about 6 months (around 8/9/2022), or if symptoms worsen or fail to improve.  Patient was given instructions and counseling regarding her condition or for health maintenance advice. Please see specific information pulled into the AVS if appropriate.       Answers for HPI/ROS submitted by the patient on 2/9/2022  What is the primary reason for your visit?: Vaginal Discharge/Irritation

## 2022-02-09 NOTE — PATIENT INSTRUCTIONS
Constipation, Adult  Constipation is when a person has fewer than three bowel movements in a week, has difficulty having a bowel movement, or has stools (feces) that are dry, hard, or larger than normal. Constipation may be caused by an underlying condition. It may become worse with age if a person takes certain medicines and does not take in enough fluids.  Follow these instructions at home:  Eating and drinking    · Eat foods that have a lot of fiber, such as beans, whole grains, and fresh fruits and vegetables.  · Limit foods that are low in fiber and high in fat and processed sugars, such as fried or sweet foods. These include french fries, hamburgers, cookies, candies, and soda.  · Drink enough fluid to keep your urine pale yellow.    General instructions  · Exercise regularly or as told by your health care provider. Try to do 150 minutes of moderate exercise each week.  · Use the bathroom when you have the urge to go. Do not hold it in.  · Take over-the-counter and prescription medicines only as told by your health care provider. This includes any fiber supplements.  · During bowel movements:  ? Practice deep breathing while relaxing the lower abdomen.  ? Practice pelvic floor relaxation.  · Watch your condition for any changes. Let your health care provider know about them.  · Keep all follow-up visits as told by your health care provider. This is important.  Contact a health care provider if:  · You have pain that gets worse.  · You have a fever.  · You do not have a bowel movement after 4 days.  · You vomit.  · You are not hungry or you lose weight.  · You are bleeding from the opening between the buttocks (anus).  · You have thin, pencil-like stools.  Get help right away if:  · You have a fever and your symptoms suddenly get worse.  · You leak stool or have blood in your stool.  · Your abdomen is bloated.  · You have severe pain in your abdomen.  · You feel dizzy or you faint.  Summary  · Constipation is  "when a person has fewer than three bowel movements in a week, has difficulty having a bowel movement, or has stools (feces) that are dry, hard, or larger than normal.  · Eat foods that have a lot of fiber, such as beans, whole grains, and fresh fruits and vegetables.  · Drink enough fluid to keep your urine pale yellow.  · Take over-the-counter and prescription medicines only as told by your health care provider. This includes any fiber supplements.  This information is not intended to replace advice given to you by your health care provider. Make sure you discuss any questions you have with your health care provider.  Document Revised: 11/04/2020 Document Reviewed: 11/04/2020  [employer] Patient Education © 2021 Elsevier Inc.  https://www.nhlbi.nih.gov/files/docs/public/heart/dash_brief.pdf\">   DASH Eating Plan  DASH stands for Dietary Approaches to Stop Hypertension. The DASH eating plan is a healthy eating plan that has been shown to:  · Reduce high blood pressure (hypertension).  · Reduce your risk for type 2 diabetes, heart disease, and stroke.  · Help with weight loss.  What are tips for following this plan?  Reading food labels  · Check food labels for the amount of salt (sodium) per serving. Choose foods with less than 5 percent of the Daily Value of sodium. Generally, foods with less than 300 milligrams (mg) of sodium per serving fit into this eating plan.  · To find whole grains, look for the word \"whole\" as the first word in the ingredient list.  Shopping  · Buy products labeled as \"low-sodium\" or \"no salt added.\"  · Buy fresh foods. Avoid canned foods and pre-made or frozen meals.  Cooking  · Avoid adding salt when cooking. Use salt-free seasonings or herbs instead of table salt or sea salt. Check with your health care provider or pharmacist before using salt substitutes.  · Do not rodriguez foods. Cook foods using healthy methods such as baking, boiling, grilling, roasting, and broiling instead.  · Cook with " heart-healthy oils, such as olive, canola, avocado, soybean, or sunflower oil.  Meal planning    · Eat a balanced diet that includes:  ? 4 or more servings of fruits and 4 or more servings of vegetables each day. Try to fill one-half of your plate with fruits and vegetables.  ? 6-8 servings of whole grains each day.  ? Less than 6 oz (170 g) of lean meat, poultry, or fish each day. A 3-oz (85-g) serving of meat is about the same size as a deck of cards. One egg equals 1 oz (28 g).  ? 2-3 servings of low-fat dairy each day. One serving is 1 cup (237 mL).  ? 1 serving of nuts, seeds, or beans 5 times each week.  ? 2-3 servings of heart-healthy fats. Healthy fats called omega-3 fatty acids are found in foods such as walnuts, flaxseeds, fortified milks, and eggs. These fats are also found in cold-water fish, such as sardines, salmon, and mackerel.  · Limit how much you eat of:  ? Canned or prepackaged foods.  ? Food that is high in trans fat, such as some fried foods.  ? Food that is high in saturated fat, such as fatty meat.  ? Desserts and other sweets, sugary drinks, and other foods with added sugar.  ? Full-fat dairy products.  · Do not salt foods before eating.  · Do not eat more than 4 egg yolks a week.  · Try to eat at least 2 vegetarian meals a week.  · Eat more home-cooked food and less restaurant, buffet, and fast food.    Lifestyle  · When eating at a restaurant, ask that your food be prepared with less salt or no salt, if possible.  · If you drink alcohol:  ? Limit how much you use to:  § 0-1 drink a day for women who are not pregnant.  § 0-2 drinks a day for men.  ? Be aware of how much alcohol is in your drink. In the [place] one drink equals one 12 oz bottle of beer (355 mL), one 5 oz glass of wine (148 mL), or one 1½ oz glass of hard liquor (44 mL).  General information  · Avoid eating more than 2,300 mg of salt a day. If you have hypertension, you may need to reduce your sodium intake to 1,500 mg a  day.  · Work with your health care provider to maintain a healthy body weight or to lose weight. Ask what an ideal weight is for you.  · Get at least 30 minutes of exercise that causes your heart to beat faster (aerobic exercise) most days of the week. Activities may include walking, swimming, or biking.  · Work with your health care provider or dietitian to adjust your eating plan to your individual calorie needs.  What foods should I eat?  Fruits  All fresh, dried, or frozen fruit. Canned fruit in natural juice (without added sugar).  Vegetables  Fresh or frozen vegetables (raw, steamed, roasted, or grilled). Low-sodium or reduced-sodium tomato and vegetable juice. Low-sodium or reduced-sodium tomato sauce and tomato paste. Low-sodium or reduced-sodium canned vegetables.  Grains  Whole-grain or whole-wheat bread. Whole-grain or whole-wheat pasta. Brown rice. Oatmeal. Quinoa. Bulgur. Whole-grain and low-sodium cereals. Page bread. Low-fat, low-sodium crackers. Whole-wheat flour tortillas.  Meats and other proteins  Skinless chicken or turkey. Ground chicken or turkey. Pork with fat trimmed off. Fish and seafood. Egg whites. Dried beans, peas, or lentils. Unsalted nuts, nut butters, and seeds. Unsalted canned beans. Lean cuts of beef with fat trimmed off. Low-sodium, lean precooked or cured meat, such as sausages or meat loaves.  Dairy  Low-fat (1%) or fat-free (skim) milk. Reduced-fat, low-fat, or fat-free cheeses. Nonfat, low-sodium ricotta or cottage cheese. Low-fat or nonfat yogurt. Low-fat, low-sodium cheese.  Fats and oils  Soft margarine without trans fats. Vegetable oil. Reduced-fat, low-fat, or light mayonnaise and salad dressings (reduced-sodium). Canola, safflower, olive, avocado, soybean, and sunflower oils. Avocado.  Seasonings and condiments  Herbs. Spices. Seasoning mixes without salt.  Other foods  Unsalted popcorn and pretzels. Fat-free sweets.  The items listed above may not be a complete list of  foods and beverages you can eat. Contact a dietitian for more information.  What foods should I avoid?  Fruits  Canned fruit in a light or heavy syrup. Fried fruit. Fruit in cream or butter sauce.  Vegetables  Creamed or fried vegetables. Vegetables in a cheese sauce. Regular canned vegetables (not low-sodium or reduced-sodium). Regular canned tomato sauce and paste (not low-sodium or reduced-sodium). Regular tomato and vegetable juice (not low-sodium or reduced-sodium). Pickles. Olives.  Grains  Baked goods made with fat, such as croissants, muffins, or some breads. Dry pasta or rice meal packs.  Meats and other proteins  Fatty cuts of meat. Ribs. Fried meat. Mi. Bologna, salami, and other precooked or cured meats, such as sausages or meat loaves. Fat from the back of a pig (fatback). Bratwurst. Salted nuts and seeds. Canned beans with added salt. Canned or smoked fish. Whole eggs or egg yolks. Chicken or turkey with skin.  Dairy  Whole or 2% milk, cream, and half-and-half. Whole or full-fat cream cheese. Whole-fat or sweetened yogurt. Full-fat cheese. Nondairy creamers. Whipped toppings. Processed cheese and cheese spreads.  Fats and oils  Butter. Stick margarine. Lard. Shortening. Ghee. Mi fat. Tropical oils, such as coconut, palm kernel, or palm oil.  Seasonings and condiments  Onion salt, garlic salt, seasoned salt, table salt, and sea salt. Worcestershire sauce. Tartar sauce. Barbecue sauce. Teriyaki sauce. Soy sauce, including reduced-sodium. Steak sauce. Canned and packaged gravies. Fish sauce. Oyster sauce. Cocktail sauce. Store-bought horseradish. Ketchup. Mustard. Meat flavorings and tenderizers. Bouillon cubes. Hot sauces. Pre-made or packaged marinades. Pre-made or packaged taco seasonings. Relishes. Regular salad dressings.  Other foods  Salted popcorn and pretzels.  The items listed above may not be a complete list of foods and beverages you should avoid. Contact a dietitian for more  information.  Where to find more information  · National Heart, Lung, and Blood [place]: www.nhlbi.nih.gov  · American Heart Association: www.heart.org  · Academy of Nutrition and Dietetics: www.eatright.org  · National Kidney Foundation: www.kidney.org  Summary  · The DASH eating plan is a healthy eating plan that has been shown to reduce high blood pressure (hypertension). It may also reduce your risk for type 2 diabetes, heart disease, and stroke.  · When on the DASH eating plan, aim to eat more fresh fruits and vegetables, whole grains, lean proteins, low-fat dairy, and heart-healthy fats.  · With the DASH eating plan, you should limit salt (sodium) intake to 2,300 mg a day. If you have hypertension, you may need to reduce your sodium intake to 1,500 mg a day.  · Work with your health care provider or dietitian to adjust your eating plan to your individual calorie needs.  This information is not intended to replace advice given to you by your health care provider. Make sure you discuss any questions you have with your health care provider.  Document Revised: 11/20/2020 Document Reviewed: 11/20/2020  Elsevier Patient Education © 2021 Elsevier Inc.

## 2022-02-10 ENCOUNTER — TELEPHONE (OUTPATIENT)
Dept: FAMILY MEDICINE CLINIC | Facility: CLINIC | Age: 47
End: 2022-02-10

## 2022-02-10 LAB
C TRACH RRNA SPEC QL NAA+PROBE: NEGATIVE
HSV1 IGG SER IA-ACNC: 6.15 INDEX (ref 0–0.9)
HSV2 IGG SER IA-ACNC: 6.16 INDEX (ref 0–0.9)
N GONORRHOEA RRNA SPEC QL NAA+PROBE: NEGATIVE

## 2022-02-10 RX ORDER — METRONIDAZOLE 500 MG/1
500 TABLET ORAL 2 TIMES DAILY
Qty: 14 TABLET | Refills: 0 | Status: SHIPPED | OUTPATIENT
Start: 2022-02-10 | End: 2022-03-23 | Stop reason: SDUPTHER

## 2022-02-10 NOTE — TELEPHONE ENCOUNTER
PATIENT IS CALLING TO CHECK ON HER REQUEST TO CORRECT THE PHARMACY FOR metroNIDAZOLE (Flagyl) 500 MG tablet WHICH NEEDS TO BE SENT TO [id number].    PLEASE CONTACT PATIENT @[phone] ONCE THIS HAS BEEN SENT OVER .

## 2022-02-10 NOTE — TELEPHONE ENCOUNTER
Caller: Vianney Villarreal    Relationship: Self    Best call back number: [phone]    Requested Prescriptions:   Requested Prescriptions     Pending Prescriptions Disp Refills   • metroNIDAZOLE (Flagyl) 500 MG tablet 14 tablet 0     Sig: Take 1 tablet by mouth 2 (Two) Times a Day.        Pharmacy where request should be sent: [id number] - [place], [place] - [address] [phone] [facility] [phone] FX     Additional details provided by patient:   PRESCRIPTION WAS SENT TO [facility] ORDER [facility] IN ERROR.  PLEASE SEND TO LOCAL [facility]       Ginny Christy Rep   02/10/22 09:29 EST

## 2022-02-11 LAB
HCV RNA SERPL NAA+PROBE-ACNC: NORMAL IU/ML
TEST INFORMATION: NORMAL

## 2022-03-14 RX ORDER — PANTOPRAZOLE SODIUM 40 MG/1
40 TABLET, DELAYED RELEASE ORAL DAILY
Qty: 90 TABLET | Refills: 3 | Status: SHIPPED | OUTPATIENT
Start: 2022-03-14 | End: 2023-03-08

## 2022-03-23 RX ORDER — METRONIDAZOLE 500 MG/1
500 TABLET ORAL 2 TIMES DAILY
Qty: 14 TABLET | Refills: 0 | Status: SHIPPED | OUTPATIENT
Start: 2022-03-23 | End: 2022-10-11

## 2022-10-11 ENCOUNTER — OFFICE VISIT (OUTPATIENT)
Dept: FAMILY MEDICINE CLINIC | Facility: CLINIC | Age: 47
End: 2022-10-11

## 2022-10-11 VITALS
SYSTOLIC BLOOD PRESSURE: 108 MMHG | HEIGHT: 65 IN | TEMPERATURE: 98.2 F | WEIGHT: 224.8 LBS | DIASTOLIC BLOOD PRESSURE: 70 MMHG | BODY MASS INDEX: 37.45 KG/M2 | OXYGEN SATURATION: 98 % | HEART RATE: 75 BPM

## 2022-10-11 DIAGNOSIS — Z12.11 SCREEN FOR COLON CANCER: ICD-10-CM

## 2022-10-11 DIAGNOSIS — U07.1 COVID-19: Primary | ICD-10-CM

## 2022-10-11 DIAGNOSIS — R05.1 ACUTE COUGH: ICD-10-CM

## 2022-10-11 PROCEDURE — 99213 OFFICE O/P EST LOW 20 MIN: CPT

## 2022-10-11 RX ORDER — GUAIFENESIN AND DEXTROMETHORPHAN HYDROBROMIDE 600; 30 MG/1; MG/1
2 TABLET, EXTENDED RELEASE ORAL 2 TIMES DAILY PRN
Qty: 30 TABLET | Refills: 1 | Status: SHIPPED | OUTPATIENT
Start: 2022-10-11

## 2022-10-11 RX ORDER — ALBUTEROL SULFATE 2.5 MG/3ML
2.5 SOLUTION RESPIRATORY (INHALATION) EVERY 4 HOURS PRN
COMMUNITY

## 2022-10-11 RX ORDER — ALBUTEROL SULFATE 90 UG/1
AEROSOL, METERED RESPIRATORY (INHALATION)
Qty: 18 G | Refills: 0 | Status: SHIPPED | OUTPATIENT
Start: 2022-10-11

## 2022-10-11 NOTE — PATIENT INSTRUCTIONS
[email or web address]    Please go to the link above to complete the [facility] if you experience symptoms for greater than 1 month. This is utilized to refer you to services such as physical therapy, pulmonology, cardiology, etc.       Take medication as prescribed. Start taking mucinex-DM twice daily with full glass of water.    Call 911 or go to the nearest ER for severe headache, fever, chest pain, shortness of breath, visual change, speech change, or any weakness in extremities.    Patient agrees with plan of care and understands instructions. Call if worsening symptoms or any problems or concerns.

## 2022-10-11 NOTE — PROGRESS NOTES
"Chief Complaint  Follow-up (Tested positive for covid 10/4), Cough, and Shortness of Breath    Subjective        Vianney Villarreal presents to [facility]  History of Present Illness  Patient is a 47-year-old female, new patient to me.  Patient of Dr. Almazan last seen in office by Rita Cummins on 2/9/2022.  Patient tested positive for COVID at [facility] clinic on 10/3/2022. Pt sta ojo shes been using her inhalers for asthma. Using albuterol nebulizer nightly before bed. Has not been using her ventolin inhaler. With history of asthma- Breo inhaler 1 puff once daily and Spiriva 1.25mcg aerosol 2 puffs every morning. Pt reports she is starting to get better, but is still experiencing sinus congestion, dry nonproductive cough, and headache. Pt denies having fever, CP, or SOA at this time. States for work she needs FMLA paperwork.    Objective   Vital Signs:  /70 (BP Location: Left arm, Patient Position: Sitting, Cuff Size: Large Adult)   Pulse 75   Temp 98.2 °F (36.8 °C) (Infrared)   Ht 165.1 cm (65\")   Wt 102 kg (224 lb 12.8 oz)   SpO2 98%   BMI 37.41 kg/m²   Estimated body mass index is 37.41 kg/m² as calculated from the following:    Height as of this encounter: 165.1 cm (65\").    Weight as of this encounter: 102 kg (224 lb 12.8 oz).          Physical Exam  Constitutional:       General: She is awake.      Appearance: Normal appearance.   HENT:      Head: Normocephalic and atraumatic.      Nose: Nose normal.      Mouth/Throat:      Mouth: Mucous membranes are moist.   Eyes:      Extraocular Movements: Extraocular movements intact.      Conjunctiva/sclera: Conjunctivae normal.      Pupils: Pupils are equal, round, and reactive to light.   Cardiovascular:      Rate and Rhythm: Normal rate and regular rhythm.      Pulses: Normal pulses.      Heart sounds: Normal heart sounds.   Pulmonary:      Effort: Pulmonary effort is normal.      Breath sounds: Normal breath " sounds and air entry. No decreased breath sounds, wheezing, rhonchi or rales.   Abdominal:      General: Bowel sounds are normal.      Palpations: Abdomen is soft.   Musculoskeletal:      Cervical back: Full passive range of motion without pain, normal range of motion and neck supple.   Skin:     General: Skin is warm and dry.   Neurological:      General: No focal deficit present.      Mental Status: She is alert and oriented to person, place, and time. Mental status is at baseline.   Psychiatric:         Attention and Perception: Attention normal.         Behavior: Behavior normal. Behavior is cooperative.        Result Review :                Assessment and Plan   Diagnoses and all orders for this visit:    1. COVID-19 (Primary)  -     albuterol sulfate HFA (Ventolin HFA) 108 (90 Base) MCG/ACT inhaler; Inhale 2 puffs every 4 hours as needed for wheezing  Dispense: 18 g; Refill: 0  -     guaifenesin-dextromethorphan (MUCINEX DM)  MG tablet sustained-release 12 hour tablet; Take 2 tablets by mouth 2 (Two) Times a Day As Needed (cough). Take with a full glass of water.  Dispense: 30 tablet; Refill: 1    2. Acute cough  -     albuterol sulfate HFA (Ventolin HFA) 108 (90 Base) MCG/ACT inhaler; Inhale 2 puffs every 4 hours as needed for wheezing  Dispense: 18 g; Refill: 0  -     guaifenesin-dextromethorphan (MUCINEX DM)  MG tablet sustained-release 12 hour tablet; Take 2 tablets by mouth 2 (Two) Times a Day As Needed (cough). Take with a full glass of water.  Dispense: 30 tablet; Refill: 1    3. Screen for colon cancer  -     Ambulatory Referral For Screening Colonoscopy    [email or web address]    Please go to the link above to complete the [facility] Covid Long Hauler Service if you experience symptoms for greater than 1 month. This is utilized to refer you to services such as physical therapy, pulmonology, cardiology, etc.       Take medication as prescribed.  Start taking mucinex-DM twice daily with full glass of water.    Call 911 or go to the nearest ER for severe headache, fever, chest pain, shortness of breath, visual change, speech change, or any weakness in extremities.    Patient agrees with plan of care and understands instructions. Call if worsening symptoms or any problems or concerns.            Follow Up   Return in about 6 months (around 4/11/2023), or if symptoms worsen or fail to improve.  Patient was given instructions and counseling regarding her condition or for health maintenance advice. Please see specific information pulled into the AVS if appropriate.       Answers for HPI/ROS submitted by the patient on 10/10/2022  What is the primary reason for your visit?: Shortness of Breath

## 2022-10-12 ENCOUNTER — TELEPHONE (OUTPATIENT)
Dept: FAMILY MEDICINE CLINIC | Facility: CLINIC | Age: 47
End: 2022-10-12

## 2022-10-12 NOTE — TELEPHONE ENCOUNTER
Caller: [facility]    Relationship: ASK FOR ROME ON THE CALLBACK     Best call back number: [phone]      What is the best time to reach you: DURING BUSINESS HOURS     Who are you requesting to speak with (clinical staff, provider,  specific staff member): CLINICAL STAFF     Do you know the name of the person who called:     What was the call regarding: KULDEEP CALLED TO CHECK THE STATUS OF THE PAPERWORK THAT SHE FAXED OVER YESTERDAY.     Do you require a callback: YES        THANKS

## 2023-01-19 ENCOUNTER — OFFICE VISIT (OUTPATIENT)
Dept: FAMILY MEDICINE CLINIC | Facility: CLINIC | Age: 48
End: 2023-01-19
Payer: COMMERCIAL

## 2023-01-19 VITALS
WEIGHT: 225 LBS | TEMPERATURE: 97.1 F | OXYGEN SATURATION: 100 % | HEIGHT: 65 IN | HEART RATE: 89 BPM | DIASTOLIC BLOOD PRESSURE: 60 MMHG | RESPIRATION RATE: 15 BRPM | SYSTOLIC BLOOD PRESSURE: 119 MMHG | BODY MASS INDEX: 37.49 KG/M2

## 2023-01-19 DIAGNOSIS — M79.672 LEFT FOOT PAIN: Primary | ICD-10-CM

## 2023-01-19 PROCEDURE — 99213 OFFICE O/P EST LOW 20 MIN: CPT | Performed by: FAMILY MEDICINE

## 2023-01-19 NOTE — PROGRESS NOTES
"    Chief Complaint  Foot Pain (Lft foot  01/06/23)    Subjective    History of Present Illness {CC  Problem List  Visit  Diagnosis   Encounters  Notes  Medications  Labs  Result Review Imaging  Media :23}     Vianney Villarreal presents to [facility] for   History of Present Illness     46 yo female patient of Dr. Gurwinder Almazan, present for evaluation of left foot pain. Pt states pain started about 1/6/2023.  States she slipped on a screw at work. State she had a xray with her company. Nothing found.   She has been on light duty for a few weeks.   States given a little strap which made it feel worse,   Use some ice when first occurred, has not used much since that time.      Social History     Socioeconomic History   • Marital status: Single   Tobacco Use   • Smoking status: Never   • Smokeless tobacco: Never   Substance and Sexual Activity   • Alcohol use: No   • Drug use: No   • Sexual activity: Defer      Objective     Vital Signs:   /60 (BP Location: Left arm, Patient Position: Sitting, Cuff Size: Adult)   Pulse 89   Temp 97.1 °F (36.2 °C) (Infrared)   Resp 15   Ht 165.1 cm (65\")   Wt 102 kg (225 lb)   SpO2 100%   BMI 37.44 kg/m²   Physical Exam  Constitutional:       General: She is not in acute distress.     Appearance: She is not ill-appearing.   HENT:      Head: Normocephalic.   Musculoskeletal:      Comments: L  laterally  No swelling  No erythema   Neurological:      Mental Status: She is alert.        Result Review  Data Reviewed:{ Labs  Result Review  Imaging  Med Tab  Media :23}   The following data was reviewed by: Tracey Rocha MD on 01/19/2023  Lab Results - Last 18 Months   Lab Units 02/09/22  0914   BUN mg/dL 7   CREATININE mg/dL 0.73   EGFR IF AFRICN AM mL/min/1.73 105   SODIUM mmol/L 139   POTASSIUM mmol/L 4.3   CHLORIDE mmol/L 105   CALCIUM mg/dL 9.0   ALBUMIN g/dL 4.00   BILIRUBIN mg/dL 1.3*   ALK PHOS U/L 76   AST (SGOT) U/L 21 "   ALT (SGPT) U/L 14   TRIGLYCERIDES mg/dL 57   HDL CHOL mg/dL 81*   VLDL CHOL mg/dL 11   LDL CHOL mg/dL 104*   LDL/HDL RATIO  1.28   WBC 10*3/mm3 4.10   RBC 10*6/mm3 4.02   HEMATOCRIT % 37.0   MCV fL 92.0   MCH pg 30.2   TSH uIU/mL 0.784              Current Outpatient Medications:   •  albuterol (PROVENTIL) (2.5 MG/3ML) 0.083% nebulizer solution, Take 2.5 mg by nebulization Every 4 (Four) Hours As Needed for Wheezing., Disp: , Rfl:   •  albuterol sulfate HFA (Ventolin HFA) 108 (90 Base) MCG/ACT inhaler, Inhale 2 puffs every 4 hours as needed for wheezing, Disp: 18 g, Rfl: 0  •  Breo Ellipta 200-25 MCG/INH inhaler, Inhale 1 puff Every Morning., Disp: 2 each, Rfl: 3  •  cetirizine (zyrTEC) 5 MG tablet, Take 5 mg by mouth Every Night., Disp: , Rfl:   •  EPIPEN 2-ANYI 0.3 MG/0.3ML solution auto-injector injection, Inject 0.3 mL into the appropriate muscle as directed by prescriber Take As Directed. TAKES ALLERGY SHOTS, Disp: 1 each, Rfl: 2  •  pantoprazole (PROTONIX) 40 MG EC tablet, Take 1 tablet by mouth Daily., Disp: 90 tablet, Rfl: 3  •  SPIRIVA RESPIMAT 1.25 MCG/ACT aerosol solution, Inhale 2 puffs Every Morning., Disp: , Rfl:   •  guaifenesin-dextromethorphan (MUCINEX DM)  MG tablet sustained-release 12 hour tablet, Take 2 tablets by mouth 2 (Two) Times a Day As Needed (cough). Take with a full glass of water., Disp: 30 tablet, Rfl: 1      Assessment and Plan {CC Problem List  Visit Diagnosis  ROS  Review (Popup)  Health Maintenance  Quality  BestPractice  Medications  SmartSets  SnapShot Encounters  Media :23}   Problem List Items Addressed This Visit    None  Visit Diagnoses     Left foot pain    -  Primary    Relevant Orders    Ambulatory Referral to Podiatry        Pt advise to take aleve every 12 hours  Wrap  With ace bandage   Ice daily   Referral placed for podiatry for further evaluation            Follow Up   Return if symptoms worsen or fail to improve.  Patient was given instructions  and counseling regarding her condition or for health maintenance advice. Please see specific information pulled into the AVS if appropriate.    EpicAct:MR_WS_AMB_ORDERS,RunParams:STARTUPTYPE=FOLLOW    MR_[email or web address]_DISCHARGE

## 2023-03-08 RX ORDER — PANTOPRAZOLE SODIUM 40 MG/1
40 TABLET, DELAYED RELEASE ORAL DAILY
Qty: 90 TABLET | Refills: 3 | Status: SHIPPED | OUTPATIENT
Start: 2023-03-08

## 2023-04-26 ENCOUNTER — OFFICE VISIT (OUTPATIENT)
Dept: FAMILY MEDICINE CLINIC | Facility: CLINIC | Age: 48
End: 2023-04-26
Payer: COMMERCIAL

## 2023-04-26 VITALS
BODY MASS INDEX: 38.09 KG/M2 | DIASTOLIC BLOOD PRESSURE: 72 MMHG | HEART RATE: 84 BPM | RESPIRATION RATE: 16 BRPM | SYSTOLIC BLOOD PRESSURE: 115 MMHG | OXYGEN SATURATION: 98 % | WEIGHT: 228.6 LBS | HEIGHT: 65 IN | TEMPERATURE: 97.4 F

## 2023-04-26 RX ORDER — METRONIDAZOLE 500 MG/1
TABLET ORAL
COMMUNITY
Start: 2023-04-18 | End: 2023-05-01

## 2023-05-01 ENCOUNTER — OFFICE VISIT (OUTPATIENT)
Dept: FAMILY MEDICINE CLINIC | Facility: CLINIC | Age: 48
End: 2023-05-01
Payer: COMMERCIAL

## 2023-05-01 VITALS
HEART RATE: 84 BPM | BODY MASS INDEX: 37.89 KG/M2 | DIASTOLIC BLOOD PRESSURE: 78 MMHG | SYSTOLIC BLOOD PRESSURE: 118 MMHG | WEIGHT: 227.4 LBS | TEMPERATURE: 98 F | HEIGHT: 65 IN | OXYGEN SATURATION: 99 %

## 2023-05-01 DIAGNOSIS — M25.561 ACUTE PAIN OF RIGHT KNEE: Primary | ICD-10-CM

## 2023-05-01 PROCEDURE — 99213 OFFICE O/P EST LOW 20 MIN: CPT

## 2023-05-01 RX ORDER — MELOXICAM 7.5 MG/1
7.5 TABLET ORAL DAILY
Qty: 30 TABLET | Refills: 0 | Status: SHIPPED | OUTPATIENT
Start: 2023-05-01

## 2023-05-01 NOTE — PATIENT INSTRUCTIONS
Do not take meloxicam with Aleve or ibuprofen.  You may take it with Tylenol.    Take 1 to 2 tablets daily as needed for mild to moderate pain.  Do not exceed 2 tablets in a 24hr period.    Our office should be in touch with you about referral to orthopedic surgery.    Patient agrees with plan of care and understands instructions. Call if worsening symptoms or any problems or concerns.

## 2023-05-01 NOTE — PROGRESS NOTES
"Chief Complaint  Knee Pain (Right knee, pain down leg, numbness/Fell late 2022/On feet all day for work)    Subjective        Vianney Villarreal presents to [facility]  History of Present Illness  Patient is a 47-year-old female and an established patient of Dr. Almazan.  Patient is here today with intermittent right knee pain that started in 2022 after a fall.  Patient reports the pain is worse when standing on hard surfaces and/or long periods of time.  Patient has tried Aleve and ibuprofen, mild relief.  Patient has not had x-ray on right knee since her fall.  Patient would like referral to Ortho surgery today.    Objective   Vital Signs:  /78 (BP Location: Left arm, Patient Position: Sitting, Cuff Size: Adult)   Pulse 84   Temp 98 °F (36.7 °C)   Ht 165.1 cm (65\")   Wt 103 kg (227 lb 6.4 oz)   SpO2 99%   BMI 37.84 kg/m²   Estimated body mass index is 37.84 kg/m² as calculated from the following:    Height as of this encounter: 165.1 cm (65\").    Weight as of this encounter: 103 kg (227 lb 6.4 oz).             Physical Exam  Constitutional:       General: She is awake.      Appearance: Normal appearance.   HENT:      Head: Normocephalic and atraumatic.      Nose: Nose normal.   Eyes:      Extraocular Movements: Extraocular movements intact.      Conjunctiva/sclera: Conjunctivae normal.      Pupils: Pupils are equal, round, and reactive to light.   Cardiovascular:      Rate and Rhythm: Normal rate.      Pulses: Normal pulses.   Pulmonary:      Effort: Pulmonary effort is normal.      Breath sounds: Normal air entry.   Skin:     General: Skin is warm and dry.   Neurological:      General: No focal deficit present.      Mental Status: She is alert and oriented to person, place, and time. Mental status is at baseline.   Psychiatric:         Attention and Perception: Attention normal.         Behavior: Behavior normal. Behavior is cooperative.        Result Review :                 "   Assessment and Plan   Diagnoses and all orders for this visit:    1. Acute pain of right knee (Primary)  -     Ambulatory Referral to Orthopedic Surgery  -     meloxicam (Mobic) 7.5 MG tablet; Take 1 tablet by mouth Daily.  Dispense: 30 tablet; Refill: 0    Do not take meloxicam with Aleve or ibuprofen.  You may take it with Tylenol.    Take 1 to 2 tablets daily as needed for mild to moderate pain.  Do not exceed 2 tablets in a 24hr period.    Our office should be in touch with you about referral to orthopedic surgery.    Patient agrees with plan of care and understands instructions. Call if worsening symptoms or any problems or concerns.        Follow Up   Return in about 3 months (around 8/1/2023), or if symptoms worsen or fail to improve, for establish care with new provider, Fasting labs.  Patient was given instructions and counseling regarding her condition or for health maintenance advice. Please see specific information pulled into the AVS if appropriate.

## 2023-05-04 ENCOUNTER — OFFICE VISIT (OUTPATIENT)
Dept: ORTHOPEDIC SURGERY | Facility: CLINIC | Age: 48
End: 2023-05-04
Payer: COMMERCIAL

## 2023-05-04 VITALS — BODY MASS INDEX: 38.67 KG/M2 | WEIGHT: 232.1 LBS | HEIGHT: 65 IN | TEMPERATURE: 97.5 F

## 2023-05-04 DIAGNOSIS — M17.11 ARTHRITIS OF RIGHT KNEE: ICD-10-CM

## 2023-05-04 DIAGNOSIS — M25.561 RIGHT KNEE PAIN, UNSPECIFIED CHRONICITY: Primary | ICD-10-CM

## 2023-05-04 NOTE — PATIENT INSTRUCTIONS
Knee Exercises  Ask your health care provider which exercises are safe for you. Do exercises exactly as told by your health care provider and adjust them as directed. It is normal to feel mild stretching, pulling, tightness, or discomfort as you do these exercises. Stop right away if you feel sudden pain or your pain gets worse. Do not begin these exercises until told by your health care provider.  Stretching and range-of-motion exercises  These exercises warm up your muscles and joints and improve the movement and flexibility of your knee. These exercises also help to relieve pain and swelling.  Knee extension, prone    Lie on your abdomen (prone position) on a bed.  Place your left / right knee just beyond the edge of the surface so your knee is not on the bed. You can put a towel under your left / right thigh just above your kneecap for comfort.  Relax your leg muscles and allow gravity to straighten your knee (extension). You should feel a stretch behind your left / right knee.  Hold this position for __________ seconds.  Scoot up so your knee is supported between repetitions.  Repeat __________ times. Complete this exercise __________ times a day.  Knee flexion, active    Lie on your back with both legs straight. If this causes back discomfort, bend your left / right knee so your foot is flat on the floor.  Slowly slide your left / right heel back toward your buttocks. Stop when you feel a gentle stretch in the front of your knee or thigh (flexion).  Hold this position for __________ seconds.  Slowly slide your left / right heel back to the starting position.  Repeat __________ times. Complete this exercise __________ times a day.  Quadriceps stretch, prone    Lie on your abdomen on a firm surface, such as a bed or padded floor.  Bend your left / right knee and hold your ankle. If you cannot reach your ankle or pant leg, loop a belt around your foot and grab the belt instead.  Gently pull your heel toward your  buttocks. Your knee should not slide out to the side. You should feel a stretch in the front of your thigh and knee (quadriceps).  Hold this position for __________ seconds.  Repeat __________ times. Complete this exercise __________ times a day.  Hamstring, supine    Lie on your back (supine position).  Loop a belt or towel over the ball of your left / right foot. The ball of your foot is on the walking surface, right under your toes.  Straighten your left / right knee and slowly pull on the belt to raise your leg until you feel a gentle stretch behind your knee (hamstring).  Do not let your knee bend while you do this.  Keep your other leg flat on the floor.  Hold this position for __________ seconds.  Repeat __________ times. Complete this exercise __________ times a day.  Strengthening exercises  These exercises build strength and endurance in your knee. Endurance is the ability to use your muscles for a long time, even after they get tired.  Quadriceps, isometric  This exercise strengthens the muscles in front of your thigh (quadriceps) without moving your knee joint (isometric).  Lie on your back with your left / right leg extended and your other knee bent. Put a rolled towel or small pillow under your knee if told by your health care provider.  Slowly tense the muscles in the front of your left / right thigh. You should see your kneecap slide up toward your hip or see increased dimpling just above the knee. This motion will push the back of the knee toward the floor.  For __________ seconds, hold the muscle as tight as you can without increasing your pain.  Relax the muscles slowly and completely.  Repeat __________ times. Complete this exercise __________ times a day.  Straight leg raises  This exercise strengthens the muscles in front of your thigh (quadriceps) and the muscles that move your hips (hip flexors).  Lie on your back with your left / right leg extended and your other knee bent.  Tense the  "muscles in the front of your left / right thigh. You should see your kneecap slide up or see increased dimpling just above the knee. Your thigh may even shake a bit.  Keep these muscles tight as you raise your leg 4-6 inches (10-15 cm) off the floor. Do not let your knee bend.  Hold this position for __________ seconds.  Keep these muscles tense as you lower your leg.  Relax your muscles slowly and completely after each repetition.  Repeat __________ times. Complete this exercise __________ times a day.  Hamstring, isometric    Lie on your back on a firm surface.  Bend your left / right knee about __________ degrees.  Dig your left / right heel into the surface as if you are trying to pull it toward your buttocks. Tighten the muscles in the back of your thighs (hamstring) to \"dig\" as hard as you can without increasing any pain.  Hold this position for __________ seconds.  Release the tension gradually and allow your muscles to relax completely for __________ seconds after each repetition.  Repeat __________ times. Complete this exercise __________ times a day.  Hamstring curls  If told by your health care provider, do this exercise while wearing ankle weights. Begin with __________lb / kg weights. Then increase the weight by 1 lb (0.5 kg) increments. Do not wear ankle weights that are more than __________lb / kg.  Lie on your abdomen with your legs straight.  Bend your left / right knee as far as you can without feeling pain. Keep your hips flat against the floor.  Hold this position for __________ seconds.  Slowly lower your leg to the starting position.  Repeat __________ times. Complete this exercise __________ times a day.  Squats  This exercise strengthens the muscles in front of your thigh and knee (quadriceps).   front of a table, with your feet and knees pointing straight ahead. You may rest your hands on the table for balance but not for support.  Slowly bend your knees and lower your hips like you " are going to sit in a chair.  Keep your weight over your heels, not over your toes.  Keep your lower legs upright so they are parallel with the table legs.  Do not let your hips go lower than your knees.  Do not bend lower than told by your health care provider.  If your knee pain increases, do not bend as low.  Hold the squat position for __________ seconds.  Slowly push with your legs to return to standing. Do not use your hands to pull yourself to standing.  Repeat __________ times. Complete this exercise __________ times a day.  Wall slides  This exercise strengthens the muscles in front of your thigh and knee (quadriceps).  Lean your back against a smooth wall or door, and walk your feet out 18-24 inches (46-61 cm) from it.  Place your feet hip-width apart.  Slowly slide down the wall or door until your knees bend __________ degrees. Keep your knees over your heels, not over your toes. Keep your knees in line with your hips.  Hold this position for __________ seconds.  Repeat __________ times. Complete this exercise __________ times a day.  Straight leg raises, side-lying  This exercise strengthens the muscles that rotate the leg at the hip and move it away from your body (hip abductors).  Lie on your side with your left / right leg in the top position. Lie so your head, shoulder, knee, and hip line up. You may bend your bottom knee to help you keep your balance.  Roll your hips slightly forward so your hips are stacked directly over each other and your left / right knee is facing forward.  Leading with your heel, lift your top leg 4-6 inches (10-15 cm). You should feel the muscles in your outer hip lifting.  Do not let your foot drift forward.  Do not let your knee roll toward the ceiling.  Hold this position for __________ seconds.  Slowly return your leg to the starting position.  Let your muscles relax completely after each repetition.  Repeat __________ times. Complete this exercise __________ times a  day.  Straight leg raises, prone  This exercise stretches the muscles that move your hips away from the front of the pelvis (hip extensors).  Lie on your abdomen on a firm surface. You can put a pillow under your hips if that is more comfortable.  Tense the muscles in your buttocks and lift your left / right leg about 4-6 inches (10-15 cm). Keep your knee straight as you lift your leg.  Hold this position for __________ seconds.  Slowly lower your leg to the starting position.  Let your leg relax completely after each repetition.  Repeat __________ times. Complete this exercise __________ times a day.  This information is not intended to replace advice given to you by your health care provider. Make sure you discuss any questions you have with your health care provider.  Document Revised: 08/30/2022 Document Reviewed: 08/30/2022  [employer] Patient Education © 2022 [employer]

## 2023-05-04 NOTE — PROGRESS NOTES
"Patient Name: Vianney Villarreal   YOB: 1975  Referring Primary Care Physician: Gurwinder Almazan MD  BMI: Body mass index is 38.62 kg/m².    Chief Complaint:    Chief Complaint   Patient presents with   • Left Knee - Follow-up, Pain        HPI:     Vianney Villarreal is a 47 y.o. female who presents today for evaluation of   Chief Complaint   Patient presents with   • Left Knee - Follow-up, Pain   .  Patient is 47 years old and works at [employer] is a .  Says she has been having pain in her right knee.  She describes it as \"numb and tight\".  Does not really radiate down her leg but knee area of the knee.  It feels stiff to her and she has some posterior pain.  She was started on meloxicam 7.5 mg and has taken 3 doses and it is greatly reduced her symptoms to where they are tolerable      Subjective   Medications:   Home Medications:  Current Outpatient Medications on File Prior to Visit   Medication Sig   • albuterol (PROVENTIL) (2.5 MG/3ML) 0.083% nebulizer solution Take 2.5 mg by nebulization Every 4 (Four) Hours As Needed for Wheezing.   • albuterol sulfate HFA (Ventolin HFA) 108 (90 Base) MCG/ACT inhaler Inhale 2 puffs every 4 hours as needed for wheezing   • Breo Ellipta 200-25 MCG/INH inhaler Inhale 1 puff Every Morning.   • cetirizine (zyrTEC) 5 MG tablet Take 1 tablet by mouth Every Night.   • EPIPEN 2-ANYI 0.3 MG/0.3ML solution auto-injector injection Inject 0.3 mL into the appropriate muscle as directed by prescriber Take As Directed. TAKES ALLERGY SHOTS   • guaifenesin-dextromethorphan (MUCINEX DM)  MG tablet sustained-release 12 hour tablet Take 2 tablets by mouth 2 (Two) Times a Day As Needed (cough). Take with a full glass of water.   • meloxicam (Mobic) 7.5 MG tablet Take 1 tablet by mouth Daily.   • pantoprazole (PROTONIX) 40 MG EC tablet TAKE 1 TABLET BY MOUTH  DAILY   • SPIRIVA RESPIMAT 1.25 MCG/ACT aerosol solution Inhale 2 puffs Every Morning.     No current " facility-administered medications on file prior to visit.     Current Medications:  Scheduled Meds:  Continuous Infusions:No current facility-administered medications for this visit.    PRN Meds:.    I have reviewed the patient's medical history in detail and updated the computerized patient record.  Review and summarization of old records includes:    Past Medical History:   Diagnosis Date   • Acid reflux    • Allergic    • Asthma    • GERD (gastroesophageal reflux disease)    • Knee swelling 04/20/2023        Past Surgical History:   Procedure Laterality Date   • ENDOSCOPY N/A 2014   • ESOPHAGEAL DILATATION N/A 2014   • TUBAL COAGULATION LAPAROSCOPIC Bilateral 07/15/2019    Procedure: LAPAROSCOPIC BILATERAL TUBAL LIGATION WITH FILSHIE CLIPS;  Surgeon: Jorgito Hallman MD;  [place] [facility] [place] [facility] [place]  Service: Obstetrics/Gynecology        Social History     Occupational History   • Occupation: assemby worker     Comment: working full time   Tobacco Use   • Smoking status: Never   • Smokeless tobacco: Never   Vaping Use   • Vaping Use: Never used   Substance and Sexual Activity   • Alcohol use: No   • Drug use: No   • Sexual activity: Yes     Partners: Male     Birth control/protection: None      Social History     Social History Narrative   • Not on file        Family History   Problem Relation Age of Onset   • No Known Problems Mother    • No Known Problems Father    • Malig Hyperthermia Neg Hx        ROS: 14 point review of systems was performed and all other systems were reviewed and are negative except for documented findings in HPI and today's encounter.     Allergies:   Allergies   Allergen Reactions   • Gabapentin Other (See Comments)     SI/HI ideations      Constitutional:  Denies fever, shaking or chills   Eyes:  Denies change in visual acuity   HENT:  Denies nasal congestion or sore throat   Respiratory:  Denies cough or shortness of breath   Cardiovascular:  Denies chest pain or severe LE edema  "  GI:  Denies abdominal pain, nausea, vomiting, bloody stools or diarrhea   Musculoskeletal:  Numbness, tingling, pain, or loss of motor function only as noted above in history of present illness.  : Denies painful urination or hematuria  Integument:  Denies rash, lesion or ulceration   Neurologic:  Denies headache or focal weakness  Endocrine:  Denies lymphadenopathy  Psych:  Denies confusion or change in mental status   Hem:  Denies active bleeding    OBJECTIVE:  Physical Exam: 47 y.o. female  Wt Readings from Last 3 Encounters:   05/04/23 105 kg (232 lb 1.6 oz)   05/01/23 103 kg (227 lb 6.4 oz)   04/26/23 104 kg (228 lb 9.6 oz)     Ht Readings from Last 1 Encounters:   05/04/23 165.1 cm (65\")     Body mass index is 38.62 kg/m².  Vitals:    05/04/23 0927   Temp: 97.5 °F (36.4 °C)     Vital signs reviewed.     General Appearance:    Alert, cooperative, in no acute distress                  Eyes: conjunctiva clear  ENT: external ears and nose atraumatic  CV: no peripheral edema  Resp: normal respiratory effort  Skin: no rashes or wounds; normal turgor  Psych: mood and affect appropriate  Lymph: no nodes appreciated  Neuro: gross sensation intact  Vascular:  Palpable peripheral pulse in noted extremity  Musculoskeletal Extremities: Exam today pleasant lady BMI of 38 she does have some swelling in her knee she has some scarring anterior from an old injury she has good range of motion she has appellate patellofemoral crepitation and grinding did Kenneth's couple times and could not get a positive response that she has some joint line tenderness and a Baker's cyst    Radiology:   P lateral 40 degree PA x-ray right knee taken the office today for complaints of pain without comparison views available show arthritis        Assessment:     ICD-10-CM ICD-9-CM   1. Right knee pain, unspecified chronicity  M25.561 719.46   2. Arthritis of right knee  M17.11 716.96        MDM/Plan:   The diagnosis(es), natural history, " pathophysiology and treatment for diagnosis(es) were discussed. Opportunity given and questions answered.  Biomechanics of pertinent body areas discussed.  When appropriate, the use of ambulatory aids discussed.    Biomechanics of pertinent body areas discussed.  When appropriate, the use of ambulatory aids discussed.  BMI:  The concept of BMI body mass index and its importance and implications discussed.    EXERCISES:  Advice on benefits of, and types of regular/moderate exercise pertaining to orthopedic diagnosis(es).  MEDICATIONS:  The risks, benefits, warnings,side effects and alternatives of medications discussed.  Inflammation/pain control; with cold, heat, elevation and/or liniments discussed as appropriate  CONSULT: This Consult is done at the request of a requesting provider to whom I will send this report with this rendered opinion.  MEDICAL RECORDS reviewed from other provider(s) for past and current medical history pertinent to this complaint.  Says she is doing well with 3 doses of 7.5 mg meloxicam would simply continue with that went over a lot of other recommendations as well if medications fail to relieve her symptoms offered a cortisone shot she can make an appointment for that    5/4/2023    Dictated utilizing Dragon dictation

## 2023-05-31 ENCOUNTER — OFFICE VISIT (OUTPATIENT)
Dept: FAMILY MEDICINE CLINIC | Facility: CLINIC | Age: 48
End: 2023-05-31

## 2023-05-31 VITALS
BODY MASS INDEX: 36.32 KG/M2 | DIASTOLIC BLOOD PRESSURE: 74 MMHG | HEIGHT: 66 IN | TEMPERATURE: 96.2 F | HEART RATE: 77 BPM | WEIGHT: 226 LBS | SYSTOLIC BLOOD PRESSURE: 124 MMHG | OXYGEN SATURATION: 99 %

## 2023-05-31 DIAGNOSIS — J45.40 MODERATE PERSISTENT ASTHMA WITHOUT COMPLICATION: ICD-10-CM

## 2023-05-31 DIAGNOSIS — K21.9 GASTROESOPHAGEAL REFLUX DISEASE WITHOUT ESOPHAGITIS: ICD-10-CM

## 2023-05-31 DIAGNOSIS — K59.00 CONSTIPATION, UNSPECIFIED CONSTIPATION TYPE: ICD-10-CM

## 2023-05-31 DIAGNOSIS — G61.0 GBS (GUILLAIN BARRE SYNDROME): ICD-10-CM

## 2023-05-31 DIAGNOSIS — T14.8XXA OPEN WOUND: ICD-10-CM

## 2023-05-31 DIAGNOSIS — Z00.00 ENCOUNTER FOR MEDICAL EXAMINATION TO ESTABLISH CARE: Primary | ICD-10-CM

## 2023-05-31 PROBLEM — R45.851 SUICIDAL THOUGHTS: Status: RESOLVED | Noted: 2020-09-17 | Resolved: 2023-05-31

## 2023-05-31 NOTE — PROGRESS NOTES
"Chief Complaint  Establish Care and Foot Injury (Bottom of r foot)    Subjective        Vianney Villarreal presents to [facility]  History of Present Illness  Known case of asthma , allergies, GBS, Uterine fibroid(asymptomatic)  CC  Establishing care  Injury of right foot  Has open wound on  plantar surface of right forefoot  Review of system is negative for fever, headache, chest pain, shortness of breath, palpitation, nausea, vomiting, any recent change in bladder habits.        Objective   Vital Signs:  /74 (BP Location: Left arm, Patient Position: Sitting)   Pulse 77   Temp 96.2 °F (35.7 °C)   Ht 167.6 cm (66\")   Wt 103 kg (226 lb)   SpO2 99%   BMI 36.48 kg/m²   Estimated body mass index is 36.48 kg/m² as calculated from the following:    Height as of this encounter: 167.6 cm (66\").    Weight as of this encounter: 103 kg (226 lb).             Physical Exam  HENT:      Head: Normocephalic and atraumatic.      Mouth/Throat:      Mouth: Mucous membranes are moist.      Pharynx: Oropharynx is clear.   Eyes:      Extraocular Movements: Extraocular movements intact.      Conjunctiva/sclera: Conjunctivae normal.      Pupils: Pupils are equal, round, and reactive to light.   Cardiovascular:      Rate and Rhythm: Normal rate and regular rhythm.   Pulmonary:      Effort: Pulmonary effort is normal.      Breath sounds: Normal breath sounds.   Abdominal:      General: Bowel sounds are normal.      Palpations: Abdomen is soft.   Musculoskeletal:         General: Normal range of motion.      Cervical back: Neck supple.   Skin:     Capillary Refill: Capillary refill takes less than 2 seconds.      Comments: Healing wound present on plantar surface of right forefoot   Neurological:      General: No focal deficit present.      Mental Status: She is alert and oriented to person, place, and time. Mental status is at baseline.   Psychiatric:         Mood and Affect: Mood normal.        Result " Review :                   Assessment and Plan   Diagnoses and all orders for this visit:    1. Encounter for medical examination to establish care (Primary)  Comments:  fasting labs ordered, will follow up  Orders:  -     CBC Auto Differential; Future  -     Comprehensive Metabolic Panel; Future  -     Lipid Panel With / Chol / HDL Ratio; Future  -     TSH; Future  -     Urinalysis With Microscopic - Urine, Clean Catch; Future    2. Moderate persistent asthma without complication  Comments:  on spiriva and breo ellipta, albuterol inhaler as needed  Orders:  -     CBC Auto Differential; Future  -     Comprehensive Metabolic Panel; Future  -     Lipid Panel With / Chol / HDL Ratio; Future  -     TSH; Future  -     Urinalysis With Microscopic - Urine, Clean Catch; Future    3. Gastroesophageal reflux disease without esophagitis  Comments:  on pantoprazole  in morning for GERd, continue same  Orders:  -     CBC Auto Differential; Future  -     Comprehensive Metabolic Panel; Future  -     Lipid Panel With / Chol / HDL Ratio; Future  -     TSH; Future  -     Urinalysis With Microscopic - Urine, Clean Catch; Future    4. Constipation, unspecified constipation type  -     CBC Auto Differential; Future  -     Comprehensive Metabolic Panel; Future  -     Lipid Panel With / Chol / HDL Ratio; Future  -     TSH; Future  -     Urinalysis With Microscopic - Urine, Clean Catch; Future    5. GBS (Guillain Catharpin syndrome)  -     CBC Auto Differential; Future  -     Comprehensive Metabolic Panel; Future  -     Lipid Panel With / Chol / HDL Ratio; Future  -     TSH; Future  -     Urinalysis With Microscopic - Urine, Clean Catch; Future    6. Open wound  Comments:  wound area clean with povidone iodine and applied antibiotic cream and covered, no redness in surrounding area seen, advise to clean with soap and water at home    patient has got allergy shot( not sure which one most likely immunotherapy), advise pt to get Tdap shot in a  week .         Follow Up   No follow-ups on file.  Patient was given instructions and counseling regarding her condition or for health maintenance advice. Please see specific information pulled into the AVS if appropriate.

## 2023-12-19 ENCOUNTER — OFFICE VISIT (OUTPATIENT)
Dept: FAMILY MEDICINE CLINIC | Facility: CLINIC | Age: 48
End: 2023-12-19
Payer: COMMERCIAL

## 2023-12-19 VITALS
RESPIRATION RATE: 14 BRPM | WEIGHT: 222.1 LBS | TEMPERATURE: 98.1 F | HEART RATE: 82 BPM | OXYGEN SATURATION: 100 % | SYSTOLIC BLOOD PRESSURE: 98 MMHG | HEIGHT: 66 IN | BODY MASS INDEX: 35.69 KG/M2 | DIASTOLIC BLOOD PRESSURE: 58 MMHG

## 2023-12-19 DIAGNOSIS — Z12.11 ENCOUNTER FOR COLORECTAL CANCER SCREENING: ICD-10-CM

## 2023-12-19 DIAGNOSIS — G61.0 GBS (GUILLAIN BARRE SYNDROME): ICD-10-CM

## 2023-12-19 DIAGNOSIS — Z12.31 ENCOUNTER FOR SCREENING MAMMOGRAM FOR MALIGNANT NEOPLASM OF BREAST: ICD-10-CM

## 2023-12-19 DIAGNOSIS — K59.00 CONSTIPATION, UNSPECIFIED CONSTIPATION TYPE: ICD-10-CM

## 2023-12-19 DIAGNOSIS — L84 CORN OF FOOT: ICD-10-CM

## 2023-12-19 DIAGNOSIS — K21.9 GASTROESOPHAGEAL REFLUX DISEASE WITHOUT ESOPHAGITIS: ICD-10-CM

## 2023-12-19 DIAGNOSIS — J45.40 MODERATE PERSISTENT ASTHMA WITHOUT COMPLICATION: ICD-10-CM

## 2023-12-19 DIAGNOSIS — Z12.12 ENCOUNTER FOR COLORECTAL CANCER SCREENING: ICD-10-CM

## 2023-12-19 DIAGNOSIS — Z00.00 ANNUAL PHYSICAL EXAM: Primary | ICD-10-CM

## 2023-12-19 NOTE — PROGRESS NOTES
"Chief Complaint  Annual Exam (Pt has been having issues using the restroom. )    Subjective        Vianney Villarreal presents to [facility]  History of Present Illness  Annual physical exam.  Denies having any new issues  Follow-up on the recent labs done  Review of system is negative for fever, headache, chest pain, shortness of breath, palpitation, nausea, vomiting, any recent change in bladder habits.    Objective   Vital Signs:  BP 98/58 (BP Location: Left arm, Patient Position: Sitting, Cuff Size: Large Adult)   Pulse 82   Temp 98.1 °F (36.7 °C) (Oral)   Resp 14   Ht 167.6 cm (65.98\")   Wt 101 kg (222 lb 1.6 oz)   SpO2 100%   BMI 35.87 kg/m²   Estimated body mass index is 35.87 kg/m² as calculated from the following:    Height as of this encounter: 167.6 cm (65.98\").    Weight as of this encounter: 101 kg (222 lb 1.6 oz).       Class 2 Severe Obesity (BMI >=35 and <=39.9). Obesity-related health conditions include the following: GERD. Obesity is newly identified. BMI is is above average; BMI management plan is completed. We discussed portion control and increasing exercise.      Physical Exam  Constitutional:       Appearance: She is obese.   HENT:      Head: Normocephalic and atraumatic.      Mouth/Throat:      Mouth: Mucous membranes are moist.      Pharynx: Oropharynx is clear.   Eyes:      Extraocular Movements: Extraocular movements intact.      Conjunctiva/sclera: Conjunctivae normal.      Pupils: Pupils are equal, round, and reactive to light.   Cardiovascular:      Rate and Rhythm: Normal rate and regular rhythm.   Pulmonary:      Effort: Pulmonary effort is normal.      Breath sounds: Normal breath sounds.   Abdominal:      General: Bowel sounds are normal.      Palpations: Abdomen is soft.   Musculoskeletal:         General: Normal range of motion.      Cervical back: Neck supple.   Skin:     General: Skin is warm.      Capillary Refill: Capillary refill takes less " than 2 seconds.   Neurological:      General: No focal deficit present.      Mental Status: She is alert and oriented to person, place, and time. Mental status is at baseline.   Psychiatric:         Mood and Affect: Mood normal.        Result Review :  The following data was reviewed by: Robert Toney MD on 12/19/2023:  CMP          12/13/2023    09:05   CMP   Glucose 79    BUN 8    Creatinine 0.76    Sodium 138    Potassium 4.1    Chloride 103    Calcium 9.4    Total Protein 7.0    Albumin 4.5    Globulin 2.5    Total Bilirubin 1.1    Alkaline Phosphatase 72    AST (SGOT) 23    ALT (SGPT) 19    BUN/Creatinine Ratio 10.5      CBC          12/13/2023    09:05   CBC   WBC 5.13    RBC 4.03    Hemoglobin 12.6    Hematocrit 37.3    MCV 92.6    MCH 31.3    MCHC 33.8    RDW 11.9    Platelets 322      Lipid Panel          12/13/2023    09:05   Lipid Panel   Total Cholesterol 209    Triglycerides 56    HDL Cholesterol 96    VLDL Cholesterol 10    LDL Cholesterol  103      TSH          12/13/2023    09:05   TSH   TSH 0.548                   Assessment and Plan   Diagnoses and all orders for this visit:    1. Annual physical exam (Primary)    2. Corn of foot  -     salicylic acid-lactic acid 17 % external solution; Apply  topically to the appropriate area as directed Daily.  Dispense: 15 mL; Refill: 0  -     CBC Auto Differential; Future  -     Comprehensive Metabolic Panel; Future  -     Lipid Panel With / Chol / HDL Ratio; Future  -     TSH; Future  -     Urinalysis With Microscopic - Urine, Clean Catch; Future    3. Encounter for colorectal cancer screening  -     Ambulatory Referral For Screening Colonoscopy    4. Encounter for screening mammogram for malignant neoplasm of breast  -     Mammo Screening Digital Tomosynthesis Bilateral With CAD    5. Moderate persistent asthma without complication  -     CBC Auto Differential; Future  -     Comprehensive Metabolic Panel; Future  -     Lipid Panel With / Chol / HDL Ratio;  Future  -     TSH; Future  -     Urinalysis With Microscopic - Urine, Clean Catch; Future    6. Gastroesophageal reflux disease without esophagitis  -     CBC Auto Differential; Future  -     Comprehensive Metabolic Panel; Future  -     Lipid Panel With / Chol / HDL Ratio; Future  -     TSH; Future  -     Urinalysis With Microscopic - Urine, Clean Catch; Future    7. 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 Use it as needed  #constipation  Advise to use OTC Miralalx as needed  # Corn  Apply salicylic acid solution daily until corn disappears.  # mmmogram, colonoscopy ordered, pap smear pt did it early this year normal as per pt through OBGYN  Patient encouraged to partake of healthy diet rich in fresh fruits and vegetables as well as lean proteins.  Patient encouraged to participate in daily exercise with goal of 30 min sustained activity.  Wear seatbelt when driving  Flu shot annually      Follow Up   No follow-ups on file.  Patient was given instructions and counseling regarding her condition or for health maintenance advice. Please see specific information pulled into the AVS if appropriate.

## 2024-01-11 ENCOUNTER — HOSPITAL ENCOUNTER (OUTPATIENT)
Dept: MAMMOGRAPHY | Facility: HOSPITAL | Age: 49
Discharge: HOME | End: 2024-01-11
Admitting: STUDENT IN AN ORGANIZED HEALTH CARE EDUCATION/TRAINING PROGRAM
Payer: COMMERCIAL

## 2024-01-11 PROCEDURE — 77067 SCR MAMMO BI INCL CAD: CPT

## 2024-01-11 PROCEDURE — 77063 BREAST TOMOSYNTHESIS BI: CPT

## 2024-02-01 ENCOUNTER — OFFICE VISIT (OUTPATIENT)
Dept: FAMILY MEDICINE CLINIC | Facility: CLINIC | Age: 49
End: 2024-02-01
Payer: COMMERCIAL

## 2024-02-01 VITALS
OXYGEN SATURATION: 97 % | TEMPERATURE: 97.6 F | RESPIRATION RATE: 16 BRPM | WEIGHT: 221.3 LBS | HEART RATE: 93 BPM | HEIGHT: 66 IN | BODY MASS INDEX: 35.57 KG/M2 | DIASTOLIC BLOOD PRESSURE: 64 MMHG | SYSTOLIC BLOOD PRESSURE: 102 MMHG

## 2024-02-01 DIAGNOSIS — R05.1 ACUTE COUGH: Primary | ICD-10-CM

## 2024-02-01 LAB
EXPIRATION DATE: NORMAL
FLUAV AG UPPER RESP QL IA.RAPID: NOT DETECTED
FLUBV AG UPPER RESP QL IA.RAPID: NOT DETECTED
INTERNAL CONTROL: NORMAL
Lab: NORMAL
SARS-COV-2 AG UPPER RESP QL IA.RAPID: NOT DETECTED

## 2024-02-01 PROCEDURE — 87428 SARSCOV & INF VIR A&B AG IA: CPT | Performed by: INTERNAL MEDICINE

## 2024-02-01 PROCEDURE — 99213 OFFICE O/P EST LOW 20 MIN: CPT | Performed by: INTERNAL MEDICINE

## 2024-02-01 RX ORDER — BENZONATATE 100 MG/1
100 CAPSULE ORAL 3 TIMES DAILY PRN
Qty: 30 CAPSULE | Refills: 0 | Status: SHIPPED | OUTPATIENT
Start: 2024-02-01

## 2024-02-01 NOTE — PROGRESS NOTES
"Chief Complaint  Cough (Cough for 4 days, has asthma  )    Subjective        Vianney Villarreal presents to [facility]  History of Present Illness  Patient is a 48 year-old female with h/o Asthma presenting to the clinic today with c/o of cough for the past 4 days. She reports she has been having intermittent cough occurring more frequently, not particular worse at anytime of the day. Sometimes productive of small mucus. No hemoptysis. She says she does not feel sick, no congestion, SOB, chest pain, headache, fever or chills. She reports she has been around people with strong perfumes that usually triggers her asthma. She has been taking her inhalers with some relief.      Objective   Vital Signs:  /64 (BP Location: Left arm, Patient Position: Sitting, Cuff Size: Adult)   Pulse 93   Temp 97.6 °F (36.4 °C) (Oral)   Resp 16   Ht 167.6 cm (65.98\")   Wt 100 kg (221 lb 4.8 oz)   SpO2 97%   BMI 35.74 kg/m²   Estimated body mass index is 35.74 kg/m² as calculated from the following:    Height as of this encounter: 167.6 cm (65.98\").    Weight as of this encounter: 100 kg (221 lb 4.8 oz).               Physical Exam  Constitutional:       General: She is not in acute distress.  HENT:      Head: Normocephalic and atraumatic.   Cardiovascular:      Heart sounds: Normal heart sounds.   Pulmonary:      Effort: Pulmonary effort is normal. No respiratory distress.      Breath sounds: Normal breath sounds.   Neurological:      Mental Status: She is alert and oriented to person, place, and time.        Result Review :                     Assessment and Plan     Diagnoses and all orders for this visit:    1. Acute cough (Primary)  Comments:  Negative POCT Covid + Flu test  Orders:  -     benzonatate (Tessalon Perles) 100 MG capsule; Take 1 capsule by mouth 3 (Three) Times a Day As Needed for Cough.  Dispense: 30 capsule; Refill: 0  -     POCT SARS-CoV-2 Antigen CHERRY + Flu    Patient is not in " resp. distress, saturating well on RA, no tachypnea. Exam is clear. This is most likely cough-variant asthma given h/o exposure to allergens or might be related to GERD.  Advice patient to avoid triggers, use her inhalers as needed and can take tessalon perles PRN for cough  To continue her PPI and avoid heavy meals especially close to bedtime.          Follow Up     No follow-ups on file.  Patient was given instructions and counseling regarding her condition or for health maintenance advice. Please see specific information pulled into the AVS if appropriate.

## 2024-03-26 ENCOUNTER — OFFICE VISIT (OUTPATIENT)
Dept: FAMILY MEDICINE CLINIC | Facility: CLINIC | Age: 49
End: 2024-03-26
Payer: COMMERCIAL

## 2024-03-26 VITALS
HEART RATE: 77 BPM | BODY MASS INDEX: 35.87 KG/M2 | DIASTOLIC BLOOD PRESSURE: 62 MMHG | HEIGHT: 66 IN | TEMPERATURE: 98.4 F | WEIGHT: 223.2 LBS | RESPIRATION RATE: 14 BRPM | SYSTOLIC BLOOD PRESSURE: 108 MMHG | OXYGEN SATURATION: 99 %

## 2024-03-26 DIAGNOSIS — N92.6 MISSED PERIODS: ICD-10-CM

## 2024-03-26 DIAGNOSIS — R10.2 PELVIC PRESSURE IN FEMALE: Primary | ICD-10-CM

## 2024-03-26 DIAGNOSIS — N89.8 VAGINAL DISCHARGE: ICD-10-CM

## 2024-03-26 DIAGNOSIS — R10.30 LOWER ABDOMINAL PAIN: ICD-10-CM

## 2024-03-26 DIAGNOSIS — N30.00 ACUTE CYSTITIS WITHOUT HEMATURIA: ICD-10-CM

## 2024-03-26 DIAGNOSIS — K59.00 CONSTIPATION, UNSPECIFIED CONSTIPATION TYPE: ICD-10-CM

## 2024-03-26 LAB
BILIRUB BLD-MCNC: NEGATIVE MG/DL
CLARITY, POC: CLEAR
COLOR UR: NORMAL
EXPIRATION DATE: NORMAL
GLUCOSE UR STRIP-MCNC: NEGATIVE MG/DL
KETONES UR QL: NEGATIVE
LEUKOCYTE EST, POC: NEGATIVE
Lab: NORMAL
NITRITE UR-MCNC: NEGATIVE MG/ML
PH UR: 7.5 [PH] (ref 5–8)
PROT UR STRIP-MCNC: NEGATIVE MG/DL
RBC # UR STRIP: NEGATIVE /UL
SP GR UR: 1.01 (ref 1–1.03)
UROBILINOGEN UR QL: NORMAL

## 2024-03-26 PROCEDURE — 81003 URINALYSIS AUTO W/O SCOPE: CPT | Performed by: STUDENT IN AN ORGANIZED HEALTH CARE EDUCATION/TRAINING PROGRAM

## 2024-03-26 PROCEDURE — 99214 OFFICE O/P EST MOD 30 MIN: CPT | Performed by: STUDENT IN AN ORGANIZED HEALTH CARE EDUCATION/TRAINING PROGRAM

## 2024-03-26 RX ORDER — NITROFURANTOIN 25; 75 MG/1; MG/1
100 CAPSULE ORAL 2 TIMES DAILY
Qty: 20 CAPSULE | Refills: 0 | Status: SHIPPED | OUTPATIENT
Start: 2024-03-26

## 2024-03-26 NOTE — PROGRESS NOTES
"Chief Complaint  Abdominal Pain (Hasn't gotten a period in a couple of months, has some white discharge. Pt said she has to hold her urine for a long time at work until she can be relieved so wonders if it stems from holding in her urine too long. No pain when urinating and it goes away. //Pt was getting regular periods but now is not. Pt has experienced some hot flashes and cravings for just sweet things (hormonal changes). )    Subjective        Vianney Villarreal presents to [facility]  History of Present Illness  For lower abdominal pain for couple of days.  Patient stated she will feel pelvic pressure only when she is holding urine but once she goes to the bathroom her discomfort usually resolves  Also complaining of vaginal discharge  Also missed her periods, LMP  ( ~2 months ago), pt is sexually active but stated pregnancy is not option as she has her tubes ligated.     Objective   Vital Signs:  /62 (BP Location: Left arm, Patient Position: Sitting, Cuff Size: Large Adult)   Pulse 77   Temp 98.4 °F (36.9 °C) (Temporal)   Resp 14   Ht 167.6 cm (65.98\")   Wt 101 kg (223 lb 3.2 oz)   SpO2 99%   BMI 36.04 kg/m²   Estimated body mass index is 36.04 kg/m² as calculated from the following:    Height as of this encounter: 167.6 cm (65.98\").    Weight as of this encounter: 101 kg (223 lb 3.2 oz).               Physical Exam  HENT:      Head: Normocephalic and atraumatic.      Mouth/Throat:      Mouth: Mucous membranes are moist.      Pharynx: Oropharynx is clear.   Eyes:      Extraocular Movements: Extraocular movements intact.      Conjunctiva/sclera: Conjunctivae normal.      Pupils: Pupils are equal, round, and reactive to light.   Cardiovascular:      Rate and Rhythm: Normal rate and regular rhythm.   Pulmonary:      Effort: Pulmonary effort is normal.      Breath sounds: Normal breath sounds.   Abdominal:      General: Bowel sounds are normal. There is no distension.      " Palpations: Abdomen is soft.      Tenderness: There is no abdominal tenderness. There is no right CVA tenderness, left CVA tenderness or guarding.   Genitourinary:     General: Normal vulva.      Vagina: No vaginal discharge.      Rectum: Normal.   Musculoskeletal:         General: Normal range of motion.      Cervical back: Neck supple.   Skin:     General: Skin is warm.      Capillary Refill: Capillary refill takes less than 2 seconds.   Neurological:      General: No focal deficit present.      Mental Status: She is alert and oriented to person, place, and time. Mental status is at baseline.   Psychiatric:         Mood and Affect: Mood normal.        Result Review :                     Recent Labs     03/26/24  1014   CLARITYU Clear   GLUCOSEUR Negative   BILIRUBINUR Negative   KETONESU Negative   SPECGRAV 1.010   RBCUR Negative   PHUR 7.5   PROTEINPOCUA Negative   UROBILINOGEN 0.2 E.U./dL   LEUKOCYTESUR Negative   NITRITE Negative        Assessment and Plan     Diagnoses and all orders for this visit:    1. Pelvic pressure in female (Primary)  -     POC Urinalysis Dipstick, Automated    2. Lower abdominal pain    3. Missed periods    4. Constipation, unspecified constipation type    5. Vaginal discharge    6. Acute cystitis without hematuria  -     nitrofurantoin, macrocrystal-monohydrate, (Macrobid) 100 MG capsule; Take 1 capsule by mouth 2 (Two) Times a Day.  Dispense: 20 capsule; Refill: 0    # UTI  Advised patient to drink water and if symptoms worsen can start antibiotics  # Constipation  Advised patient to eat more fiber in diet and hydration in order to have bowel movements every day  # Missed.  Patient is not interested in doing pregnancy test as she has tube ligated and she has not had any relationship in past 2 months  RTC if problem worsen or persist         Follow Up     No follow-ups on file.  Patient was given instructions and counseling regarding her condition or for health maintenance advice.  Please see specific information pulled into the AVS if appropriate.

## 2024-04-02 NOTE — TELEPHONE ENCOUNTER
Rx Refill Note  Requested Prescriptions     Pending Prescriptions Disp Refills    pantoprazole (PROTONIX) 40 MG EC tablet 90 tablet 3     Sig: Take 1 tablet by mouth Daily.      Last office visit with prescribing clinician: 3/26/2024   Last telemedicine visit with prescribing clinician: Visit date not found   Next office visit with prescribing clinician: 12/31/2024                         Would you like a call back once the refill request has been completed: [] Yes [] No    If the office needs to give you a call back, can they leave a voicemail: [] Yes [] No    Ginny Moore  04/02/24, 15:08 EDT

## 2024-04-03 ENCOUNTER — TELEPHONE (OUTPATIENT)
Dept: FAMILY MEDICINE CLINIC | Facility: CLINIC | Age: 49
End: 2024-04-03

## 2024-04-03 RX ORDER — PANTOPRAZOLE SODIUM 40 MG/1
40 TABLET, DELAYED RELEASE ORAL DAILY
Qty: 90 TABLET | Refills: 3 | Status: CANCELLED | OUTPATIENT
Start: 2024-04-03

## 2024-04-03 RX ORDER — PANTOPRAZOLE SODIUM 40 MG/1
40 TABLET, DELAYED RELEASE ORAL DAILY
Qty: 90 TABLET | Refills: 3 | Status: SHIPPED | OUTPATIENT
Start: 2024-04-03 | End: 2024-04-04 | Stop reason: SDUPTHER

## 2024-04-03 NOTE — TELEPHONE ENCOUNTER
Caller: Vianney Villarreal    Relationship: Self    Best call back number: [phone] (Mobile)     What medication are you requesting: DIFLUCAN    What are your current symptoms: YEAST INFECTION     How long have you been experiencing symptoms: A FEW DAYS     Have you had these symptoms before:    [x] Yes  [] No    Have you been treated for these symptoms before:   [x] Yes  [] No    If a prescription is needed, what is your preferred pharmacy and phone number:    [id number] - [place], [place] - [address] - [phone] [facility] [phone]  [phone]   Additional notes:PLEASE ADVISE.

## 2024-04-03 NOTE — TELEPHONE ENCOUNTER
Caller: Vianney Villarreal    Relationship: Self    Best call back number:     [phone] (Mobile)       Requested Prescriptions:   Requested Prescriptions     Pending Prescriptions Disp Refills    pantoprazole (PROTONIX) 40 MG EC tablet 90 tablet 3     Sig: Take 1 tablet by mouth Daily.        Pharmacy where request should be sent: [facility] [address] [phone] [facility] [phone]      Last office visit with prescribing clinician: 3/26/2024   Last telemedicine visit with prescribing clinician: Visit date not found   Next office visit with prescribing clinician: 12/31/2024     Additional details provided by patient: PLEASE SEND TO [facility], WAS SENT TO WRONG PHARMACY.     Does the patient have less than a 3 day supply:  [] Yes  [x] No    Would you like a call back once the refill request has been completed: [x] Yes [] No    If the office needs to give you a call back, can they leave a voicemail: [x] Yes [] No    Ginny Dominguez Rep   04/03/24 09:47 EDT

## 2024-04-04 DIAGNOSIS — B37.9 YEAST INFECTION: Primary | ICD-10-CM

## 2024-04-04 RX ORDER — PANTOPRAZOLE SODIUM 40 MG/1
40 TABLET, DELAYED RELEASE ORAL DAILY
Qty: 90 TABLET | Refills: 3 | Status: SHIPPED | OUTPATIENT
Start: 2024-04-04

## 2024-04-04 RX ORDER — FLUCONAZOLE 150 MG/1
150 TABLET ORAL
Qty: 2 TABLET | Refills: 0 | Status: SHIPPED | OUTPATIENT
Start: 2024-04-04

## 2024-04-04 NOTE — TELEPHONE ENCOUNTER
Rx Refill Note  Requested Prescriptions     Pending Prescriptions Disp Refills    pantoprazole (PROTONIX) 40 MG EC tablet 90 tablet 3     Sig: Take 1 tablet by mouth Daily.      Last office visit with prescribing clinician: 3/26/2024   Last telemedicine visit with prescribing clinician: Visit date not found   Next office visit with prescribing clinician: 12/31/2024                         Would you like a call back once the refill request has been completed: [] Yes [] No    If the office needs to give you a call back, can they leave a voicemail: [] Yes [] No    Ginny Moore  04/04/24, 08:10 EDT

## 2024-05-06 ENCOUNTER — TELEPHONE (OUTPATIENT)
Dept: GASTROENTEROLOGY | Facility: CLINIC | Age: 49
End: 2024-05-06
Payer: COMMERCIAL

## 2024-05-16 DIAGNOSIS — Z12.11 ENCOUNTER FOR SCREENING FOR MALIGNANT NEOPLASM OF COLON: Primary | ICD-10-CM

## 2024-05-16 RX ORDER — SODIUM CHLORIDE, SODIUM LACTATE, POTASSIUM CHLORIDE, CALCIUM CHLORIDE 600; 310; 30; 20 MG/100ML; MG/100ML; MG/100ML; MG/100ML
30 INJECTION, SOLUTION INTRAVENOUS CONTINUOUS
OUTPATIENT
Start: 2024-05-16

## 2024-05-20 ENCOUNTER — TELEPHONE (OUTPATIENT)
Dept: GASTROENTEROLOGY | Facility: CLINIC | Age: 49
End: 2024-05-20
Payer: COMMERCIAL

## 2024-05-20 ENCOUNTER — OFFICE VISIT (OUTPATIENT)
Dept: FAMILY MEDICINE CLINIC | Facility: CLINIC | Age: 49
End: 2024-05-20
Payer: COMMERCIAL

## 2024-05-20 VITALS
WEIGHT: 228 LBS | BODY MASS INDEX: 36.64 KG/M2 | OXYGEN SATURATION: 98 % | HEART RATE: 72 BPM | HEIGHT: 66 IN | RESPIRATION RATE: 18 BRPM | DIASTOLIC BLOOD PRESSURE: 62 MMHG | SYSTOLIC BLOOD PRESSURE: 122 MMHG | TEMPERATURE: 97.8 F

## 2024-05-20 DIAGNOSIS — J06.9 UPPER RESPIRATORY TRACT INFECTION, UNSPECIFIED TYPE: Primary | ICD-10-CM

## 2024-05-20 DIAGNOSIS — R05.1 ACUTE COUGH: ICD-10-CM

## 2024-05-20 PROCEDURE — 87428 SARSCOV & INF VIR A&B AG IA: CPT | Performed by: STUDENT IN AN ORGANIZED HEALTH CARE EDUCATION/TRAINING PROGRAM

## 2024-05-20 PROCEDURE — 99213 OFFICE O/P EST LOW 20 MIN: CPT | Performed by: STUDENT IN AN ORGANIZED HEALTH CARE EDUCATION/TRAINING PROGRAM

## 2024-05-20 NOTE — PROGRESS NOTES
"Chief Complaint  URI (5/17/24 Cough , congestion, SOA.)    Subjective        Vianney Villarreal presents to [facility]  History of Present Illness  For URI,  symptoms such as cough congestion since 5/17/2024  Denies having any fever, sore throat, sinus pain    Objective   Vital Signs:  /62 (BP Location: Left arm, Patient Position: Sitting, Cuff Size: Large Adult)   Pulse 72   Temp 97.8 °F (36.6 °C) (Infrared)   Resp 18   Ht 167.6 cm (66\")   Wt 103 kg (228 lb)   SpO2 98%   BMI 36.80 kg/m²   Estimated body mass index is 36.8 kg/m² as calculated from the following:    Height as of this encounter: 167.6 cm (66\").    Weight as of this encounter: 103 kg (228 lb).               Physical Exam  HENT:      Mouth/Throat:      Mouth: Mucous membranes are moist.      Pharynx: Oropharynx is clear. No oropharyngeal exudate or posterior oropharyngeal erythema.   Eyes:      Extraocular Movements: Extraocular movements intact.      Conjunctiva/sclera: Conjunctivae normal.      Pupils: Pupils are equal, round, and reactive to light.   Cardiovascular:      Rate and Rhythm: Normal rate.      Pulses: Normal pulses.   Pulmonary:      Effort: Pulmonary effort is normal.      Breath sounds: Normal breath sounds. No wheezing or rhonchi.   Abdominal:      General: Bowel sounds are normal.      Palpations: Abdomen is soft.   Neurological:      Mental Status: She is alert.        Result Review :                     Assessment and Plan     Diagnoses and all orders for this visit:    1. Upper respiratory tract infection, unspecified type (Primary)    2. Acute cough  -     POCT SARS-CoV-2 Antigen CHERRY + Flu         # Patient's symptoms are related to viral upper respiratory tract infection symptoms.  Encourage patient to eat healthy meals and hydrate herself to avoid any dehydration  Also checked for flu and COVID in the office which came out negative  Advised to take Tylenol/ibuprofen as needed for body " aches.  It is okay to take over-the-counter cold flu medication since patient has runny nose and cough also  Lung examination completely normal no wheezing.  Also provided patient with work letter for 2 days off and return back to work on 22nd.  She works as a  night shift  RTC if problem persist or worsen    Follow Up     No follow-ups on file.  Patient was given instructions and counseling regarding her condition or for health maintenance advice. Please see specific information pulled into the AVS if appropriate.

## 2024-05-21 PROBLEM — Z12.11 ENCOUNTER FOR SCREENING FOR MALIGNANT NEOPLASM OF COLON: Status: ACTIVE | Noted: 2024-05-16

## 2024-05-21 NOTE — TELEPHONE ENCOUNTER
CAROL Tam for COLONOSCOPY on 9/24/2024  arrive at  7:30 . Sent prep instructions to pt my chart....miralax

## 2024-07-09 ENCOUNTER — OFFICE VISIT (OUTPATIENT)
Dept: FAMILY MEDICINE CLINIC | Facility: CLINIC | Age: 49
End: 2024-07-09
Payer: COMMERCIAL

## 2024-07-09 VITALS
OXYGEN SATURATION: 100 % | WEIGHT: 225.1 LBS | DIASTOLIC BLOOD PRESSURE: 64 MMHG | RESPIRATION RATE: 16 BRPM | SYSTOLIC BLOOD PRESSURE: 88 MMHG | HEIGHT: 66 IN | TEMPERATURE: 98 F | BODY MASS INDEX: 36.17 KG/M2 | HEART RATE: 66 BPM

## 2024-07-09 DIAGNOSIS — N95.1 HOT FLUSHES, PERIMENOPAUSAL: Primary | ICD-10-CM

## 2024-07-09 DIAGNOSIS — J06.9 UPPER RESPIRATORY TRACT INFECTION, UNSPECIFIED TYPE: ICD-10-CM

## 2024-07-09 DIAGNOSIS — G47.09 OTHER INSOMNIA: ICD-10-CM

## 2024-07-09 PROCEDURE — 99214 OFFICE O/P EST MOD 30 MIN: CPT | Performed by: STUDENT IN AN ORGANIZED HEALTH CARE EDUCATION/TRAINING PROGRAM

## 2024-07-09 NOTE — PROGRESS NOTES
"Chief Complaint  Hot Flashes (X 2 WEEKS. PT HERE TODAY WITH GRANDDAUGHTER.), Night Sweats (2 WEEKS.), Fatigue, anxious (DENIED HISTORY OF PANIC ATTACKS.), Nasal Congestion, and Hypotension (LEFT ARM; 88/64 & RIGHT ARM; 86/62.)    Unique Villarreal presents to [facility]  History of Present Illness  For sweets and hot flashes for 2 weeks.  Patient stated for almost 4 days she is having sinus congestion, cough and feeling not well.  She is not able to sleep for almost a week.  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  She is doing this job for almost 8 years now.  Denies having any fever, sore throat, chest pain or shortness of breath at this point.  Patient stated she does have shortness of breath when go upstairs.  Objective   Vital Signs:  BP (!) 88/64 (BP Location: Left arm, Patient Position: Sitting, Cuff Size: Large Adult)   Pulse 66   Temp 98 °F (36.7 °C) (Oral)   Resp 16   Ht 167.6 cm (66\")   Wt 102 kg (225 lb 1.6 oz)   SpO2 100%   BMI 36.33 kg/m²   Estimated body mass index is 36.33 kg/m² as calculated from the following:    Height as of this encounter: 167.6 cm (66\").    Weight as of this encounter: 102 kg (225 lb 1.6 oz).               Physical Exam  Vitals reviewed: Recheck blood pressure with blue cuff 100/60.   HENT:      Right Ear: Tympanic membrane, ear canal and external ear normal.      Left Ear: Tympanic membrane, ear canal and external ear normal.      Mouth/Throat:      Pharynx: Posterior oropharyngeal erythema present. No oropharyngeal exudate.   Eyes:      Extraocular Movements: Extraocular movements intact.      Conjunctiva/sclera: Conjunctivae normal.      Pupils: Pupils are equal, round, and reactive to light.   Cardiovascular:      Rate and Rhythm: Normal rate.      Pulses: Normal pulses.      Heart sounds: Normal heart sounds.   Pulmonary:      " Effort: Pulmonary effort is normal.      Breath sounds: Normal breath sounds.   Musculoskeletal:         General: Normal range of motion.   Skin:     General: Skin is warm.   Neurological:      Mental Status: She is alert.        Result Review :                     Assessment and Plan     Diagnoses and all orders for this visit:    1. Hot flushes, perimenopausal (Primary)  Comments:  Patient will monitor symptoms for now and will come back if she decides on medication    2. Upper respiratory tract infection, unspecified type  Comments:  Advised to take over-the-counter NSAIDs or Tylenol as needed for body aches and sinus congestion    3. Other insomnia  Comments:  Multiple causative factors such as job shift, perimenopausal symptoms, viral upper respiratory tract infection symptoms, for now try melatonin as needed         # Chart reviewed April 23, 2024 she had visit with her OB/GYN for well woman exam Dr. Caro  As per the notes patient is having irregular menstrual period this since December 2023 and she was explained in detail by Dr. Weaver that it is common to have irregular.'s in the perimenopause    Also she found to have multiple uterine fibroids when she underwent laparoscopic sterilization procedure in 2019 and plan was to repeat ultrasound at that time for further evaluation because of heavy bleeding and painful menstruation    Patient also requested Dr. Caro to have her tested for any STDs at that time  As per Pap smear is concerned if patient remain in a long-term mutually monogamous sexual relationship then the next Pap smear with HPV cotesting should be performed in 2028 as per Dr. Caro note    # Explained in detail about perimenopausal symptoms and indication for medication  In my opinion it would be too soon to start patient on any medication for perimenopausal symptoms  There is a possibility since patient is also having upper respiratory tract infection likely viral related her symptoms are  exaggerated because of multiple problems at this point  For viral upper respiratory tract infection patient has to make sure she has adequate hydration, healthy meals and rest  RTC if problem persist or worsen  Otherwise patient is scheduled to have next scheduled visit in December    Follow Up     No follow-ups on file.  Patient was given instructions and counseling regarding her condition or for health maintenance advice. Please see specific information pulled into the AVS if appropriate.

## 2024-09-24 ENCOUNTER — HOSPITAL ENCOUNTER (OUTPATIENT)
Facility: HOSPITAL | Age: 49
Setting detail: HOSPITAL OUTPATIENT SURGERY
Discharge: HOME | End: 2024-09-24
Attending: INTERNAL MEDICINE | Admitting: INTERNAL MEDICINE
Payer: COMMERCIAL

## 2024-09-24 ENCOUNTER — ANESTHESIA EVENT (OUTPATIENT)
Dept: GASTROENTEROLOGY | Facility: HOSPITAL | Age: 49
End: 2024-09-24
Payer: COMMERCIAL

## 2024-09-24 ENCOUNTER — ANESTHESIA (OUTPATIENT)
Dept: GASTROENTEROLOGY | Facility: HOSPITAL | Age: 49
End: 2024-09-24
Payer: COMMERCIAL

## 2024-09-24 VITALS
BODY MASS INDEX: 36.48 KG/M2 | WEIGHT: 227 LBS | HEIGHT: 66 IN | RESPIRATION RATE: 18 BRPM | HEART RATE: 79 BPM | SYSTOLIC BLOOD PRESSURE: 120 MMHG | DIASTOLIC BLOOD PRESSURE: 83 MMHG | OXYGEN SATURATION: 100 %

## 2024-09-24 DIAGNOSIS — Z12.11 ENCOUNTER FOR SCREENING FOR MALIGNANT NEOPLASM OF COLON: ICD-10-CM

## 2024-09-24 LAB
B-HCG UR QL: NEGATIVE
EXPIRATION DATE: NORMAL
INTERNAL NEGATIVE CONTROL: NEGATIVE
INTERNAL POSITIVE CONTROL: POSITIVE
Lab: NORMAL

## 2024-09-24 PROCEDURE — 25010000002 GLYCOPYRROLATE 0.2 MG/ML SOLUTION: Performed by: NURSE ANESTHETIST, CERTIFIED REGISTERED

## 2024-09-24 PROCEDURE — 45380 COLONOSCOPY AND BIOPSY: CPT | Performed by: INTERNAL MEDICINE

## 2024-09-24 PROCEDURE — 88305 TISSUE EXAM BY PATHOLOGIST: CPT | Performed by: INTERNAL MEDICINE

## 2024-09-24 PROCEDURE — 25010000002 PROPOFOL 10 MG/ML EMULSION: Performed by: NURSE ANESTHETIST, CERTIFIED REGISTERED

## 2024-09-24 PROCEDURE — 25810000003 LACTATED RINGERS PER 1000 ML: Performed by: INTERNAL MEDICINE

## 2024-09-24 PROCEDURE — 81025 URINE PREGNANCY TEST: CPT | Performed by: INTERNAL MEDICINE

## 2024-09-24 RX ORDER — PROMETHAZINE HYDROCHLORIDE 25 MG/1
25 TABLET ORAL ONCE AS NEEDED
Status: DISCONTINUED | OUTPATIENT
Start: 2024-09-24 | End: 2024-09-24 | Stop reason: HOSPADM

## 2024-09-24 RX ORDER — SODIUM CHLORIDE, SODIUM LACTATE, POTASSIUM CHLORIDE, CALCIUM CHLORIDE 600; 310; 30; 20 MG/100ML; MG/100ML; MG/100ML; MG/100ML
30 INJECTION, SOLUTION INTRAVENOUS CONTINUOUS
Status: DISCONTINUED | OUTPATIENT
Start: 2024-09-24 | End: 2024-09-24 | Stop reason: HOSPADM

## 2024-09-24 RX ORDER — GLYCOPYRROLATE 0.2 MG/ML
INJECTION INTRAMUSCULAR; INTRAVENOUS AS NEEDED
Status: DISCONTINUED | OUTPATIENT
Start: 2024-09-24 | End: 2024-09-24 | Stop reason: SURG

## 2024-09-24 RX ORDER — PROMETHAZINE HYDROCHLORIDE 25 MG/1
25 SUPPOSITORY RECTAL ONCE AS NEEDED
Status: DISCONTINUED | OUTPATIENT
Start: 2024-09-24 | End: 2024-09-24 | Stop reason: HOSPADM

## 2024-09-24 RX ORDER — PROPOFOL 10 MG/ML
VIAL (ML) INTRAVENOUS AS NEEDED
Status: DISCONTINUED | OUTPATIENT
Start: 2024-09-24 | End: 2024-09-24 | Stop reason: SURG

## 2024-09-24 RX ORDER — LIDOCAINE HYDROCHLORIDE 20 MG/ML
INJECTION, SOLUTION INFILTRATION; PERINEURAL AS NEEDED
Status: DISCONTINUED | OUTPATIENT
Start: 2024-09-24 | End: 2024-09-24 | Stop reason: SURG

## 2024-09-24 RX ORDER — ONDANSETRON 2 MG/ML
4 INJECTION INTRAMUSCULAR; INTRAVENOUS ONCE AS NEEDED
Status: DISCONTINUED | OUTPATIENT
Start: 2024-09-24 | End: 2024-09-24 | Stop reason: HOSPADM

## 2024-09-24 RX ADMIN — SODIUM CHLORIDE, POTASSIUM CHLORIDE, SODIUM LACTATE AND CALCIUM CHLORIDE 30 ML/HR: 600; 310; 30; 20 INJECTION, SOLUTION INTRAVENOUS at 08:04

## 2024-09-24 RX ADMIN — PROPOFOL 200 MCG/KG/MIN: 10 INJECTION, EMULSION INTRAVENOUS at 08:25

## 2024-09-24 RX ADMIN — PROPOFOL 100 MG: 10 INJECTION, EMULSION INTRAVENOUS at 08:25

## 2024-09-24 RX ADMIN — LIDOCAINE HYDROCHLORIDE 60 MG: 20 INJECTION, SOLUTION INFILTRATION; PERINEURAL at 08:25

## 2024-09-24 RX ADMIN — GLYCOPYRROLATE 0.2 MG: 0.2 INJECTION INTRAMUSCULAR; INTRAVENOUS at 08:24

## 2024-09-25 LAB
CYTO UR: NORMAL
LAB AP CASE REPORT: NORMAL
PATH REPORT.FINAL DX SPEC: NORMAL
PATH REPORT.GROSS SPEC: NORMAL

## 2024-10-17 ENCOUNTER — TELEPHONE (OUTPATIENT)
Dept: GASTROENTEROLOGY | Facility: CLINIC | Age: 49
End: 2024-10-17
Payer: COMMERCIAL

## 2024-10-17 DIAGNOSIS — Z12.11 ENCOUNTER FOR SCREENING FOR MALIGNANT NEOPLASM OF COLON: ICD-10-CM

## 2024-10-17 NOTE — TELEPHONE ENCOUNTER
----- Message from Beau Cain sent at 10/17/2024  7:23 AM EDT -----  The polyp(s) biopsies showed adenomatous change. This is not cancerous but is considered potentially precancerous. Follow-up colonoscopy in 5 years is advised.

## 2024-10-17 NOTE — TELEPHONE ENCOUNTER
Patient called. Advised as per Dr. Cain's note. She verb understanding.   Repeat c/s in 5 yrs added to recall/hm lists 09/24/29

## 2025-01-08 ENCOUNTER — OFFICE VISIT (OUTPATIENT)
Dept: FAMILY MEDICINE CLINIC | Facility: CLINIC | Age: 50
End: 2025-01-08
Payer: COMMERCIAL

## 2025-01-08 VITALS
HEIGHT: 66 IN | WEIGHT: 229.4 LBS | HEART RATE: 72 BPM | BODY MASS INDEX: 36.87 KG/M2 | TEMPERATURE: 98.2 F | DIASTOLIC BLOOD PRESSURE: 70 MMHG | OXYGEN SATURATION: 98 % | RESPIRATION RATE: 16 BRPM | SYSTOLIC BLOOD PRESSURE: 108 MMHG

## 2025-01-08 DIAGNOSIS — K21.9 GASTROESOPHAGEAL REFLUX DISEASE WITHOUT ESOPHAGITIS: ICD-10-CM

## 2025-01-08 DIAGNOSIS — K59.00 CONSTIPATION, UNSPECIFIED CONSTIPATION TYPE: ICD-10-CM

## 2025-01-08 DIAGNOSIS — G89.29 CHRONIC LEFT HIP PAIN: ICD-10-CM

## 2025-01-08 DIAGNOSIS — M25.552 CHRONIC LEFT HIP PAIN: ICD-10-CM

## 2025-01-08 DIAGNOSIS — Z12.31 SCREENING MAMMOGRAM, ENCOUNTER FOR: ICD-10-CM

## 2025-01-08 DIAGNOSIS — Z00.00 ANNUAL PHYSICAL EXAM: Primary | ICD-10-CM

## 2025-01-08 DIAGNOSIS — G89.29 UPPER BACK PAIN, CHRONIC: ICD-10-CM

## 2025-01-08 DIAGNOSIS — M54.9 UPPER BACK PAIN, CHRONIC: ICD-10-CM

## 2025-01-08 DIAGNOSIS — J45.40 MODERATE PERSISTENT ASTHMA WITHOUT COMPLICATION: ICD-10-CM

## 2025-01-08 PROCEDURE — 99396 PREV VISIT EST AGE 40-64: CPT | Performed by: STUDENT IN AN ORGANIZED HEALTH CARE EDUCATION/TRAINING PROGRAM

## 2025-01-08 NOTE — PROGRESS NOTES
Chief Complaint  Annual Exam, Abnormal Lab (FROM 12/24/2024.), Sinus Problem, Headache, and Hip Pain (LEFT.)    Subjective    History of Present Illness  The patient is a 49-year-old female who presents for a physical exam.    She reports experiencing occasional palpitations, described as a sensation of rapid heartbeats, predominantly at night. These episodes sometimes disrupt her sleep and necessitate a reduction in her breathing rate.    Her asthma symptoms are generally well-managed with inhalers, although she notes a slight exacerbation during weather changes. She is on albuterol inhaler as needed and Breo Ellipta inhaler. She also uses Spiriva.    She has been experiencing headaches and pressure in her ears, which have been causing discomfort for the past two days. She has been taking Tylenol to manage the pain. Additionally, she reports severe itching in her ears.    She has been experiencing left hip pain for over six months, which she attributes to her work. Despite purchasing a new bed, the pain persists. She has not undergone any x-rays for this issue. She also reports chronic upper back pain, particularly in her neck and shoulders.    She experiences a peculiar odor and occasional itching prior to her menstrual cycle, which occurs every three to four months. Her menstrual flow is heavy initially, followed by spotting. She has undergone transvaginal and transabdominal scans due to severe abdominal pain, which have since improved.    She has a history of constipation, unresponsive to MiraLAX or fiber supplements, resulting in infrequent bowel movements, approximately twice a week, often accompanied by hard stools and straining. She maintains adequate hydration, consuming five 16-ounce bottles of water daily, and occasionally more. She admits to ignoring the urge to defecate, and when she does attempt, the stool volume is minimal.    She is currently on Protonix and probiotics for acid reflux, which are  "effective in managing her symptoms.    She has a history of high cholesterol levels.    She is on cetirizine and carries an EpiPen for allergies. She is currently receiving allergy injections.    MEDICATIONS  Current: Albuterol inhaler, Breo Ellipta inhaler, cetirizine, Protonix, probiotics, Spiriva, EpiPen.       Vianney Villarreal presents to [facility]  History of Present Illness    Objective   Vital Signs:  /70 (BP Location: Left arm, Patient Position: Sitting, Cuff Size: Large Adult)   Pulse 72   Temp 98.2 °F (36.8 °C) (Oral)   Resp 16   Ht 167.6 cm (66\")   Wt 104 kg (229 lb 6.4 oz)   SpO2 98%   BMI 37.03 kg/m²   Estimated body mass index is 37.03 kg/m² as calculated from the following:    Height as of this encounter: 167.6 cm (66\").    Weight as of this encounter: 104 kg (229 lb 6.4 oz).    Class 2 Severe Obesity (BMI >=35 and <=39.9). Obesity-related health conditions include the following: none. Obesity is unchanged. BMI is is above average; BMI management plan is completed. We discussed portion control and increasing exercise.      Physical Exam  Constitutional:       Appearance: She is obese.   Cardiovascular:      Rate and Rhythm: Normal rate.      Pulses: Normal pulses.      Heart sounds: Normal heart sounds.   Pulmonary:      Breath sounds: Normal breath sounds.   Musculoskeletal:         General: Normal range of motion.   Skin:     General: Skin is warm.   Neurological:      Mental Status: She is alert and oriented to person, place, and time.   Psychiatric:         Mood and Affect: Mood normal.        Result Review :                Assessment and Plan   Diagnoses and all orders for this visit:    1. Annual physical exam (Primary)    2. Screening mammogram, encounter for  -     Mammo Screening Digital Tomosynthesis Bilateral With CAD; Future    3. Chronic left hip pain  -     Ambulatory Referral to Physical Therapy for Evaluation & Treatment    4. Upper back " pain, chronic  -     Ambulatory Referral to Physical Therapy for Evaluation & Treatment    5. Moderate persistent asthma without complication    6. Gastroesophageal reflux disease without esophagitis    7. Constipation, unspecified constipation type        Assessment & Plan  1. Palpitations.  She reports occasional palpitations, sometimes occurring at night and causing difficulty in breathing. An EKG will be conducted next visit. She is advised to monitor the frequency and severity of these episodes and report any increase.    2. Asthma.  Her asthma is currently controlled with an albuterol inhaler as needed and Breo Ellipta inhaler. She also uses Spiriva. She is advised to continue her current regimen and monitor for any changes, especially with weather fluctuations.    3. Acid reflux.  She is using Protonix and probiotics, which are effectively managing her symptoms. She is advised to continue her current treatment.    4. Constipation.  She experiences severe constipation, with bowel movements occurring only twice a week and requiring significant straining. She is advised to take MiraLAX daily, incorporate fiber into her diet, and consume a bowl of lettuce before her main meal. She should also maintain her current water intake of approximately 80 ounces per day. This regimen should be followed for at least 3 to 4 weeks to observe any improvements in bowel movements.    5. Cholesterol management.  Her cholesterol levels are slightly elevated but have not significantly changed since the last visit. She is advised to limit her egg yolk intake to one per day, reduce red meat consumption, and increase her intake of fruits and vegetables.    6. Menstrual irregularities.  Her symptoms may be related to her menopausal age. A transvaginal and transabdominal scan revealed a normal endometrium thickness of 7.84, normal cervix, and non-visible ovaries. She is advised to monitor her symptoms and report any changes.    7.  Headache and ear pressure.  Her symptoms suggest a viral infection, which should resolve within 2 weeks. She does not have a fever, indicating that the infection is not bacterial in nature. She is advised to take Tylenol or ibuprofen as needed for comfort and to wash her face with warm water to clear her sinuses.    8. Left hip pain.  Her weight is not the direct cause of her hip pain, but it could exacerbate the condition if she develops arthritis. A referral for physical therapy will be provided to address her hip pain. She is also advised to lose weight to alleviate stress on her joints. If physical therapy does not help, imaging of the hip will be considered.    9. Upper back pain.  A referral for physical therapy will be provided to address her upper back pain, particularly in the neck and shoulder areas. She will be taught exercises to strengthen the muscles around the affected areas.    10. Health maintenance.  Her blood pressure is within normal range. She has gained 4 pounds since her last visit in July 2024. Her last Pap smear was conducted in March 2023, and she is not due for another until March 2026. Her last mammogram was done in January 2024, and she is due for another after the 11th of this month. An order for a mammogram will be placed.    11. Allergies.  She is on cetirizine and carries an EpiPen for allergies. She is currently receiving allergy injections.    Follow-up  The patient will follow up in 1 year for her next physical exam or sooner if any acute issues arise.     The 10-year ASCVD risk score (Sebastian BABCOCK, et al., 2019) is: 0.3%    Values used to calculate the score:      Age: 49 years      Sex: Female      Is Non- : Yes      Diabetic: No      Tobacco smoker: No      Systolic Blood Pressure: 108 mmHg      Is BP treated: No      HDL Cholesterol: 82 mg/dL      Total Cholesterol: 202 mg/dL     Patient encouraged to partake of healthy diet rich in fresh fruits and  vegetables as well as lean proteins.  Patient encouraged to participate in daily exercise with goal of 30 min sustained activity.  Wear seatbelt when driving  Flu shot annually      Follow Up   No follow-ups on file.  Patient was given instructions and counseling regarding her condition or for health maintenance advice. Please see specific information pulled into the AVS if appropriate.     Patient or patient representative verbalized consent for the use of Ambient Listening during the visit with  Robert Toney MD for chart documentation. 1/9/2025  14:46 EST

## 2025-02-07 RX ORDER — PANTOPRAZOLE SODIUM 40 MG/1
40 TABLET, DELAYED RELEASE ORAL DAILY
Qty: 90 TABLET | Refills: 3 | Status: SHIPPED | OUTPATIENT
Start: 2025-02-07

## 2025-03-10 ENCOUNTER — TELEPHONE (OUTPATIENT)
Dept: FAMILY MEDICINE CLINIC | Facility: CLINIC | Age: 50
End: 2025-03-10
Payer: COMMERCIAL

## 2025-03-10 NOTE — TELEPHONE ENCOUNTER
Caller: Vianney Villarreal    Relationship: Self    Best call back number: [phone]    What orders are you requesting (i.e. lab or imaging): PHYSICAL THERAPY    In what timeframe would the patient need to come in: ASAP    Where will you receive your lab/imaging services: [facility]    PHONE [phone]    Additional notes: ACCEPTS PATIENTS INSURANCE

## 2025-03-11 NOTE — TELEPHONE ENCOUNTER
PT'S ORDER HAS NOW BEEN FAXED TO [facility] ON [address] ON 3-11-25. I HAVE ALSO CALLED PT AND MADE HER AWARE OF THIS. KS

## 2025-03-19 ENCOUNTER — TELEPHONE (OUTPATIENT)
Dept: FAMILY MEDICINE CLINIC | Facility: CLINIC | Age: 50
End: 2025-03-19
Payer: COMMERCIAL

## 2025-03-19 DIAGNOSIS — M54.9 UPPER BACK PAIN, CHRONIC: ICD-10-CM

## 2025-03-19 DIAGNOSIS — M25.552 CHRONIC LEFT HIP PAIN: Primary | ICD-10-CM

## 2025-03-19 DIAGNOSIS — G89.29 CHRONIC LEFT HIP PAIN: Primary | ICD-10-CM

## 2025-03-19 DIAGNOSIS — G89.29 UPPER BACK PAIN, CHRONIC: ICD-10-CM

## 2025-03-19 NOTE — TELEPHONE ENCOUNTER
[facility] CALLED WITH A [employer]. THEY ARE NEEDING PT'S REFERRAL FIXED WITHIN 4 DAYS. THEY RECEIVED REFERRAL FOR PHYSICAL THERAPY BUT IT IS DATED 1/8 WHICH WOULD NOT BE ACCEPTED BY HER INSURANCE. PLEASE PUT IN NEW REFERRAL FOR PT. INFORMED KRYSTIAN SO KRYSTIAN IS GOING TO WATCH FOR THIS SO SHE CAN SEND THIS OVER TO THEM ONCE FIXED.    THANK YOU

## 2025-03-25 ENCOUNTER — E-VISIT (OUTPATIENT)
Dept: FAMILY MEDICINE CLINIC | Facility: TELEHEALTH | Age: 50
End: 2025-03-25
Payer: COMMERCIAL

## 2025-03-25 PROCEDURE — FABRICHEALTHVISIT: Performed by: NURSE PRACTITIONER

## 2025-03-25 NOTE — E-VISIT TREATED
Date: 2025 15:24:12  Clinician: Elisa Nichols  Clinician NPI: [id number]  Patient: Vianney Villarreal  Patient : 1975  Patient Address: [address] [place], [place] [address]  Patient Phone: [phone]  Visit Protocol: Bacterial vaginosis  Patient Summary:  Vianney is a 49 year old ( : 1975 ) female who initiated a visit for bacterial vaginosis.     Vianney began noticing vaginal discharge and vaginal pruritus more than 2 weeks ago. The discharge has a fishy smell.    Symptom   details   Vaginal discharge: The discharge is gray in color and is thin in appearance.    Vianney denies having pelvic pain, increased urinary frequency, increased urgency to urinate, vaginal burning, abdominal pain, and genital lesions. Vianney also   denies feeling feverish.   Vianney has attempted to treat current symptoms but does not remember the name of the medication.   Precipitating events   Vianney reports they have not been sexually active in the past 90 days.   Vianney denies having an IUD   placed in the last 60 days, the use of vaginal lubricants, and the use of vaginal hygiene products.   Pertinent medical history  Vianney has a history of bacterial vaginosis. Vianney has had 2 occurrences in the past year. Metronidazole (Flagyl) was used   to treat previous bacterial vaginosis episode. The metronidazole was helpful.   Vianney denies taking antibiotics in the past 2 weeks. Preferred medication route: Pill   Vianney denies having a sexually transmitted infection.   Vianney has had a yeast   infection in the past.   STI testing status: Last tested 0-1 year ago and reports they do not have an STI.   Vianney does not have diabetes.   Vianney has not had pelvic surgery or a procedure in the past 6 weeks.   Vianney denies having   immunosuppressive conditions (e.g., chemotherapy, HIV, organ transplant, long-term use of steroids or other immunosuppressive medications, splenectomy).   Vianney does not smoke  "or use smokeless tobacco. Vianney does not vape or use other e-cigarette   products.   Vianney denies pregnancy and denies breastfeeding.     MEDICATIONS: pantoprazole oral, immune globulin (human) (IgG) intravenous, immune globul,gamma(IgG)-malt-IgA over 50 mcg/mL intravenous, diphenhydramine oral, acetaminophen oral, acetaminophen oral, lidocaine (PF) injection, immune   globul,gamma(IgG)-malt-IgA over 50 mcg/mL intravenous, famotidine intravenous, cetirizine oral, immune globul,gamma(IgG)-malt-IgA over 50 mcg/mL intravenous, ALLERGIES: gabapentin  Clinician Response:  Dear Vianney,  Based on the information you have provided, you have bacterial vaginosis. Bacterial vaginosis is the most common vaginal infection among women of childbearing age. This condition is notorious for causing severe   complications related to the reproductive health of women. Bacterial vaginosis can occur when the usual balance of \"good\" and \"bad\" bacteria that reside in the vagina is disrupted with an overgrowth of the bad bacteria.   Medication information  I am   prescribing:     Metronidazole 500 mg oral tablet. Take 1 tablet by mouth every 12 hours for 7 days. There are no refills with this prescription.   Complete your entire course of medication, even if the symptoms go away. If you stop early, the infection could come back.    Self care  Personal lubricants containing chlorhexidine gluconate (Conceptrol(r), K-Y Jelly, and Surgilube(r)) significantly inhibit the good bacteria, allowing bad bacteria to grow, leading to bacterial vaginosis. Lubricants such as Astroglide(r)   Liquid, Good Clean Love Almost Naked, and K-Y Warming Jelly do not cause this effect.      Steps you can take to prevent bacterial vaginosis:     Taking oral probiotics (especially Lactobacillus rhamnosus and Lactobacillus reuteri) can help to improve vaginal health.    Do not wash the vagina with scented or douching products or use vaginal deodorants. These upset " the balance of good and harmful bacteria in your vagina.    Using a condom during sex    Limiting your number of sex partners    Using a dental dam during sex. A dental dam is a thin piece of latex that is placed over the vagina before oral sex.    Cleaning or covering sex toys with condoms each time before use      When to seek care  With treatment, symptoms can improve within 4-5 days with complete clearing in 8-10 days.  Please make an appointment to be seen in a clinic or urgent care if any of the following occur:     If you have discomfort while urinating but aren't experiencing changes in your vaginal discharge, painful urination may be a sign of a urinary tract infection (UTI) or STI    If you are having pain in the pelvic region (in the lower belly area), this is suggestive of conditions affecting the womb (uterus) itself, such as pelvic inflammatory disease or endometriosis     If your symptoms have not improved in 5 days or not resolved in 10 days     It is possible to have an allergic reaction to an antibiotic even if you have not had one in the past. If you notice a new rash, significant swelling, or difficulty breathing, stop taking this medication immediately and go to a clinic or urgent care.   Diagnosis: Bacterial vaginosis  Diagnosis ICD: N76.0    Follow up instructions: ATTENTION: If you have been prescribed medications, your prescriptions will not be sent until you choose your pharmacy.  To do so open the link within your notification, or go to [facility] and click eVisit in the menu to open your   treatment plan. From there, you can select your pharmacy at the bottom of your after visit summary. You can also go to [email or web address]  Prescriptions  Prescription: fluconazole 150 mg oral tablet, take 1 tablet by mouth now and repeat in 3 days x 1  Sent To: [id number] - [id number] - [address] [place] [address]  Prescription: metronidazole 500  mg oral tablet, take 1 tablet by mouth every 12 hours for 7 days  Sent To: [id number] - [id number] - [address] [id number]

## 2025-03-26 ENCOUNTER — HOSPITAL ENCOUNTER (OUTPATIENT)
Dept: MAMMOGRAPHY | Facility: HOSPITAL | Age: 50
Discharge: HOME | End: 2025-03-26
Admitting: STUDENT IN AN ORGANIZED HEALTH CARE EDUCATION/TRAINING PROGRAM
Payer: COMMERCIAL

## 2025-03-26 DIAGNOSIS — Z12.31 SCREENING MAMMOGRAM, ENCOUNTER FOR: ICD-10-CM

## 2025-03-26 PROCEDURE — 77063 BREAST TOMOSYNTHESIS BI: CPT

## 2025-03-26 PROCEDURE — 77067 SCR MAMMO BI INCL CAD: CPT

## 2025-04-22 ENCOUNTER — OFFICE VISIT (OUTPATIENT)
Dept: FAMILY MEDICINE CLINIC | Facility: CLINIC | Age: 50
End: 2025-04-22
Payer: COMMERCIAL

## 2025-04-22 VITALS
SYSTOLIC BLOOD PRESSURE: 110 MMHG | OXYGEN SATURATION: 100 % | WEIGHT: 223.4 LBS | RESPIRATION RATE: 16 BRPM | BODY MASS INDEX: 35.9 KG/M2 | DIASTOLIC BLOOD PRESSURE: 68 MMHG | HEART RATE: 76 BPM | HEIGHT: 66 IN | TEMPERATURE: 98.2 F

## 2025-04-22 DIAGNOSIS — R13.10 DYSPHAGIA, UNSPECIFIED TYPE: ICD-10-CM

## 2025-04-22 DIAGNOSIS — R13.11 ORAL PHASE DYSPHAGIA: ICD-10-CM

## 2025-04-22 DIAGNOSIS — J34.89 STUFFY AND RUNNY NOSE: ICD-10-CM

## 2025-04-22 DIAGNOSIS — R05.1 ACUTE COUGH: Primary | ICD-10-CM

## 2025-04-22 DIAGNOSIS — J02.9 SORE THROAT: ICD-10-CM

## 2025-04-22 DIAGNOSIS — R09.81 NASAL CONGESTION: ICD-10-CM

## 2025-04-22 LAB
EXPIRATION DATE: NORMAL
FLUAV AG NPH QL: NEGATIVE
FLUBV AG NPH QL: NEGATIVE
INTERNAL CONTROL: NORMAL
Lab: NORMAL
S PYO RRNA THROAT QL PROBE: NEGATIVE
SARS-COV-2 AG UPPER RESP QL IA.RAPID: NOT DETECTED

## 2025-04-24 ENCOUNTER — OFFICE VISIT (OUTPATIENT)
Dept: FAMILY MEDICINE CLINIC | Facility: CLINIC | Age: 50
End: 2025-04-24
Payer: COMMERCIAL

## 2025-04-24 VITALS
HEART RATE: 72 BPM | BODY MASS INDEX: 35.52 KG/M2 | TEMPERATURE: 96.5 F | OXYGEN SATURATION: 100 % | HEIGHT: 66 IN | WEIGHT: 221 LBS | SYSTOLIC BLOOD PRESSURE: 128 MMHG | DIASTOLIC BLOOD PRESSURE: 80 MMHG | RESPIRATION RATE: 16 BRPM

## 2025-04-24 DIAGNOSIS — J06.9 VIRAL UPPER RESPIRATORY TRACT INFECTION WITH COUGH: Primary | ICD-10-CM

## 2025-04-24 DIAGNOSIS — J45.909 MODERATE ASTHMA WITHOUT COMPLICATION, UNSPECIFIED WHETHER PERSISTENT: ICD-10-CM

## 2025-04-24 PROCEDURE — 99214 OFFICE O/P EST MOD 30 MIN: CPT | Performed by: STUDENT IN AN ORGANIZED HEALTH CARE EDUCATION/TRAINING PROGRAM

## 2025-04-24 NOTE — PROGRESS NOTES
Office Note     Name: Vianney Villarreal    : 1975     MRN: [id number]     Chief Complaint  Cough (hoarseness)    Subjective     History of Present Illness:  Vianney Villarreal is a 49 y.o. female     History of Present Illness  The patient is a 49-year-old female with asthma who presents today with worsening cough and shortness of breath. She was seen by Dr. Salas 2 days ago for this issue, at which point she tested negative for influenza and COVID-19. Symptoms have continued to worsen since that visit.    Worsening Cough and Shortness of Breath  - Exacerbation began on 2025  - Dr. Salas suspected a viral etiology  - Advised to return if condition deteriorated  - Symptoms intensified, causing difficulty speaking due to throat irritation  - Nasal congestion with clear discharge, yellowish-green discharge noted upon awakening  - Symptoms more pronounced during night and early morning hours  - Unable to sleep for the past 2 nights due to cough and throat discomfort  - No diffuse muscle aches or fevers reported  - Attempts to alleviate symptoms with honey and lozenges ineffective  - Flonase nasal spray previously used but discontinued due to perceived worsening of symptoms  - Similar episodes typically occur around this time each year    Asthma History  - Moderate asthma noted  - Typical use of rescue inhaler once or twice a week, particularly when exposed to certain odors at workplace  - Inhaler needed approximately 3 times this week  - Current medication regimen includes albuterol, Spiriva, Breo Ellipta, and an allergy medication    Supplemental information: None       Past Medical History:   Past Medical History:   Diagnosis Date    Acid reflux     Allergic     Arthritis     Asthma     DDD (degenerative disc disease), cervical 2018    GERD (gastroesophageal reflux disease)     Knee swelling 2023       Past Surgical History:   Past Surgical History:   Procedure Laterality Date    COLONOSCOPY  W/ POLYPECTOMY N/A 9/24/2024    Procedure: COLONOSCOPY with cold biopsy polypectomy;  Surgeon: Beau Cain MD;  Location: [facility];  Service: Gastroenterology;  Laterality: N/A;  pre: screening  post: polyp    ENDOSCOPY N/A 2014    ESOPHAGEAL DILATATION N/A 2014    TUBAL COAGULATION LAPAROSCOPIC Bilateral 07/15/2019    Procedure: LAPAROSCOPIC BILATERAL TUBAL LIGATION WITH FILSHIE CLIPS;  Surgeon: Jorgtio Hallman MD;  [place] [facility] [place] [facility]  Service: Obstetrics/Gynecology       Immunizations:   Immunization History   Administered Date(s) Administered    COVID-19 (MODERNA) 1st,2nd,3rd Dose Monovalent 12/22/2021, 01/18/2022    Hepatitis A 06/07/2018    Influenza, Unspecified 07/19/2016    Tdap 06/07/2023    influenza Split 10/25/2018, 10/08/2019        Medications:     Current Outpatient Medications:     albuterol sulfate HFA (Ventolin HFA) 108 (90 Base) MCG/ACT inhaler, Inhale 2 puffs every 4 hours as needed for wheezing (Patient taking differently: Inhale 1 puff Every 4 (Four) Hours As Needed. Inhale 2 puffs every 4 hours as needed for wheezing), Disp: 18 g, Rfl: 0    Breo Ellipta 200-25 MCG/INH inhaler, Inhale 1 puff Every Morning., Disp: 2 each, Rfl: 3    cetirizine (zyrTEC) 5 MG tablet, Take 1 tablet by mouth Every Night., Disp: , Rfl:     EPIPEN 2-ANYI 0.3 MG/0.3ML solution auto-injector injection, Inject 0.3 mL into the appropriate muscle as directed by prescriber Take As Directed. TAKES ALLERGY SHOTS, Disp: 1 each, Rfl: 2    pantoprazole (PROTONIX) 40 MG EC tablet, TAKE 1 TABLET BY MOUTH DAILY, Disp: 90 tablet, Rfl: 3    Probiotic Product (PROBIOTIC DAILY PO), Take 1 tablet by mouth Daily., Disp: , Rfl:     SPIRIVA RESPIMAT 1.25 MCG/ACT aerosol solution, Inhale 2 puffs Every Morning., Disp: , Rfl:     Allergies:   Allergies   Allergen Reactions    Gabapentin Other (See Comments)     SI/HI ideations        Family History:   Family History   Problem Relation Age of Onset    No Known  "Problems Mother     No Known Problems Father     Juana Hyperthermia Neg Hx     Breast cancer Neg Hx        Social History:   Social History     Socioeconomic History    Marital status: Single   Tobacco Use    Smoking status: Never     Passive exposure: Never    Smokeless tobacco: Never   Vaping Use    Vaping status: Never Used   Substance and Sexual Activity    Alcohol use: No    Drug use: No    Sexual activity: Yes     Partners: Male     Birth control/protection: None     Comment: Tubes tied         Objective     Vital Signs  /80 (BP Location: Left arm, Patient Position: Sitting, Cuff Size: Large Adult)   Pulse 72   Temp 96.5 °F (35.8 °C) (Temporal)   Resp 16   Ht 167.6 cm (65.98\")   Wt 100 kg (221 lb)   SpO2 100%   BMI 35.69 kg/m²   Estimated body mass index is 35.69 kg/m² as calculated from the following:    Height as of this encounter: 167.6 cm (65.98\").    Weight as of this encounter: 100 kg (221 lb).            Physical Exam  Constitutional:       General: She is not in acute distress.  HENT:      Head: Normocephalic and atraumatic.   Cardiovascular:      Rate and Rhythm: Normal rate and regular rhythm.   Pulmonary:      Effort: Pulmonary effort is normal. No respiratory distress.      Breath sounds: No wheezing or rhonchi.      Comments: Intermittent, dry cough  Musculoskeletal:      Right lower leg: No edema.      Left lower leg: No edema.   Skin:     Findings: No rash.   Neurological:      General: No focal deficit present.      Mental Status: She is alert and oriented to person, place, and time.   Psychiatric:         Mood and Affect: Mood normal.         Behavior: Behavior normal.         Thought Content: Thought content normal.         Judgment: Judgment normal.          Assessment and Plan     Assessment & Plan  1. Viral upper respiratory tract infection: Acute.  - Symptoms include worsening cough and shortness of breath, with negative influenza and COVID tests. Physical examination reveals " no active wheezing, suggesting no active asthma exacerbation. Discussed the likely viral cause and the role of postnasal drip in exacerbating symptoms.  - Prescribed Flonase nasal spray (2 puffs in each nostril nightly and in the morning if needed)  - Advised nasal saline rinses before using Flonase  - Continued daily allergy medications and maintenance inhalers  - Recommended hot steamy showers, humidifier use, and over-the-counter cough suppressants like Benadryl or NyQuil  - Advised to return if symptoms persist beyond a week or worsen    2. Asthma: Stable.  - Currently using albuterol, Spiriva, and Breo Ellipta, with increased use of albuterol noted recently  - No changes to current maintenance inhalers as there is no active asthma exacerbation  - Reviewed the possibility of using a combined steroid and beta agonist inhaler if asthma control becomes inadequate in the future  - Encouraged to monitor symptoms and seek immediate medical attention if experiencing increased shortness of breath or audible wheezing    Follow-up  - Advised to return if symptoms persist beyond a week or worsen  - Encouraged to seek immediate medical attention if experiencing increased shortness of breath or audible wheezing         Follow Up  No follow-ups on file.      I spent 30 minutes caring for Vianney on this date of service. This time includes time spent by me in the following activities:preparing for the visit, reviewing tests, obtaining and/or reviewing a separately obtained history, performing a medically appropriate examination and/or evaluation , counseling and educating the patient/family/caregiver, ordering medications, tests, or procedures, documenting information in the medical record, independently interpreting results and communicating that information with the patient/family/caregiver, and care coordination      MD EVELIN [facility]  [address] [place] [address]  [address] [address]  [phone]    Patient or patient representative verbalized consent for the use of Ambient Listening during the visit with  Casper Ayala MD for chart documentation. 4/24/2025  14:31 EDT

## 2025-05-01 NOTE — PROGRESS NOTES
"Chief Complaint  Cough (SCRATCHY THROAT./SINCE THIS SATURDAY NIGHT.), Nasal Congestion (STUFFY & RUNNY NOSE.), and Earache (ONLY ITCHING IN BOTH )    Subjective    History of Present Illness  The patient is a 49-year-old female who presents for evaluation of acute issues.    She reports experiencing rhinorrhea and a scratchy throat, which she attributes to postnasal drainage that has been causing her to cough. She also describes a sensation of nasal congestion. Despite these symptoms, no associated fatigue or malaise is reported. Allergy injections were received yesterday. The onset of symptoms was noted on Saturday, with an irritated throat and a persistent need to clear it. By Sunday, a mild cough had developed. Sleep was disrupted last night due to the sensation of postnasal drainage. Pruritus in the ears is also reported. Efforts to maintain adequate hydration are being made. The albuterol inhaler has been used intermittently as needed for shortness of breath, and Breo Ellipta is used daily. Cetirizine is taken nightly.       Vianney Villarreal presents to [facility]  Cough  Associated symptoms: ear pain    Earache  Associated symptoms: cough        Objective   Vital Signs:  /68 (BP Location: Left arm, Patient Position: Sitting, Cuff Size: Adult)   Pulse 76   Temp 98.2 °F (36.8 °C) (Oral)   Resp 16   Ht 167.6 cm (66\")   Wt 101 kg (223 lb 6.4 oz)   SpO2 100%   BMI 36.06 kg/m²   Estimated body mass index is 36.06 kg/m² as calculated from the following:    Height as of this encounter: 167.6 cm (66\").    Weight as of this encounter: 101 kg (223 lb 6.4 oz).            Physical Exam  HENT:      Nose: Congestion and rhinorrhea present.      Mouth/Throat:      Pharynx: Posterior oropharyngeal erythema present.   Eyes:      Extraocular Movements: Extraocular movements intact.      Conjunctiva/sclera: Conjunctivae normal.      Pupils: Pupils are equal, round, and reactive to light. "   Cardiovascular:      Rate and Rhythm: Normal rate.      Pulses: Normal pulses.      Heart sounds: Normal heart sounds.   Pulmonary:      Effort: Pulmonary effort is normal.      Breath sounds: Normal breath sounds.   Abdominal:      Palpations: Abdomen is soft.   Neurological:      Mental Status: She is alert and oriented to person, place, and time.        Result Review :                Assessment and Plan   Diagnoses and all orders for this visit:    1. Acute cough (Primary)  -     POC Influenza A / B  -     POCT SARS-CoV-2 Antigen  -     POCT Strep A, molecular    2. Sore throat  -     POC Influenza A / B  -     POCT SARS-CoV-2 Antigen  -     POCT Strep A, molecular    3. Oral phase dysphagia  -     POC Influenza A / B  -     POCT SARS-CoV-2 Antigen  -     POCT Strep A, molecular    4. Dysphagia, unspecified type  -     POC Influenza A / B  -     POCT SARS-CoV-2 Antigen  -     POCT Strep A, molecular    5. Nasal congestion  -     POC Influenza A / B  -     POCT SARS-CoV-2 Antigen  -     POCT Strep A, molecular    6. Stuffy and runny nose  -     POC Influenza A / B  -     POCT SARS-CoV-2 Antigen  -     POCT Strep A, molecular        Assessment & Plan  1. Viral infection.  - Reports a runny nose, scratchy throat, and postnasal drainage causing coughing, which started on 04/19/2025. Does not have fatigue or tiredness.  - Temperature is 98.2, heart rate is normal, weight is now 223 pounds. Ears and throat appear normal upon examination, chest is clear with no wheezing or crepitation. Strep test is negative.  - Symptoms suggest a viral infection rather than allergies. Advised to take over-the-counter Tylenol or ibuprofen for discomfort. If throat becomes more inflamed and painful, can alternate between ibuprofen and Tylenol.  - Maintain a healthy diet and ensure adequate hydration. If fever develops by the end of the week, it may indicate a bacterial infection, and medical attention should be sought.             Follow Up   No follow-ups on file.  Patient was given instructions and counseling regarding her condition or for health maintenance advice. Please see specific information pulled into the AVS if appropriate.     Patient or patient representative verbalized consent for the use of Ambient Listening during the visit with  Robert Toney MD for chart documentation. 5/1/2025  12:42 EDT

## 2025-05-13 ENCOUNTER — NURSE TRIAGE (OUTPATIENT)
Dept: CALL CENTER | Facility: HOSPITAL | Age: 50
End: 2025-05-13
Payer: COMMERCIAL

## 2025-05-13 ENCOUNTER — OFFICE VISIT (OUTPATIENT)
Dept: FAMILY MEDICINE CLINIC | Facility: CLINIC | Age: 50
End: 2025-05-13
Payer: COMMERCIAL

## 2025-05-13 VITALS
RESPIRATION RATE: 16 BRPM | BODY MASS INDEX: 35.15 KG/M2 | DIASTOLIC BLOOD PRESSURE: 70 MMHG | TEMPERATURE: 98.2 F | WEIGHT: 218.7 LBS | SYSTOLIC BLOOD PRESSURE: 110 MMHG | HEIGHT: 66 IN | HEART RATE: 64 BPM | OXYGEN SATURATION: 99 %

## 2025-05-13 DIAGNOSIS — J06.9 UPPER RESPIRATORY TRACT INFECTION, UNSPECIFIED TYPE: ICD-10-CM

## 2025-05-13 DIAGNOSIS — J45.41 MODERATE PERSISTENT ASTHMA WITH EXACERBATION: Primary | ICD-10-CM

## 2025-05-13 PROCEDURE — 99214 OFFICE O/P EST MOD 30 MIN: CPT | Performed by: STUDENT IN AN ORGANIZED HEALTH CARE EDUCATION/TRAINING PROGRAM

## 2025-05-13 RX ORDER — FLUCONAZOLE 150 MG/1
150 TABLET ORAL SEE ADMIN INSTRUCTIONS
COMMUNITY
Start: 2025-03-25

## 2025-05-13 RX ORDER — AZITHROMYCIN 250 MG/1
TABLET, FILM COATED ORAL
Qty: 6 TABLET | Refills: 0 | Status: SHIPPED | OUTPATIENT
Start: 2025-05-13

## 2025-05-13 RX ORDER — METRONIDAZOLE 500 MG/1
1 TABLET ORAL EVERY 12 HOURS SCHEDULED
COMMUNITY
Start: 2025-03-25 | End: 2025-05-13

## 2025-05-13 RX ORDER — METHYLPREDNISOLONE 4 MG/1
TABLET ORAL
Qty: 21 TABLET | Refills: 0 | Status: SHIPPED | OUTPATIENT
Start: 2025-05-13

## 2025-05-13 RX ORDER — ALBUTEROL SULFATE 90 UG/1
POWDER, METERED RESPIRATORY (INHALATION)
COMMUNITY
Start: 2025-04-17

## 2025-05-13 NOTE — TELEPHONE ENCOUNTER
"Hub- She is short of breath with movement. Key word- May be asthma     The patient- She has asthma - She is in a different work environment with fumes. She has been getting worse over 2 weeks. She would like an office visit.     Declined ER and care advice - She is calling for an office visit only.       Outcome- Attempt to reach office times 2 not successful. Will sent an urgent message to the office - She does have My chart.  Reason for Disposition   [1] Caller requests to speak ONLY to PCP AND [2] URGENT question    Additional Information   Negative: Lab calling with strep throat test results and triager can call in prescription   Negative: Lab calling with urinalysis test results and triager can call in prescription   Negative: Medication questions   Negative: Medication renewal and refill questions   Negative: Pre-operative or pre-procedural questions   Negative: ED call to PCP (i.e., primary care provider; doctor, NP, or PA)   Negative: Doctor (or NP/PA) call to PCP   Negative: Call about patient who is currently hospitalized   Negative: Lab or radiology calling with CRITICAL test results   Negative: [1] Follow-up call from patient regarding patient's clinical status AND [2] information urgent    Answer Assessment - Initial Assessment Questions  1. REASON FOR CALL or QUESTION: \"What is your reason for calling today?\" or \"How can I best  help you?\" or \"What question do you have that I can help answer?\"      Office visit   2. CALLER: Document the source of call. (e.g., laboratory, patient).      Patient.    Protocols used: PCP Call - No Triage-ADULT-AH    "

## 2025-05-13 NOTE — PROGRESS NOTES
Chief Complaint  Cough (ONGOING, USING INHALER EVERY 4 HOURS. /HAS BEEN SWITCHED TO ANOTHER DEPT AT [employer].), Asthma (USING HER INHALER A LOT.), and Shortness of Breath (ONGOING, USING INHALER EVERY 4 HOURS.)    Subjective    History of Present Illness  The patient is a 49-year-old female who presents for evaluation of an acute issue.    She was diagnosed with a viral infection a few weeks prior, which has since resolved. However, she continues to experience persistent coughing. She sought medical attention from another physician due to the ongoing cough. She recently started a new job where she is exposed to various fumes, which she believes are exacerbating her asthma. She has been absent from work for the past 3 days due to this belief. Despite her absence, she continues to experience shortness of breath when she coughs. Her symptoms are particularly severe at night, characterized by constant coughing and the production of yellowish or green phlegm upon waking. She has not recently consulted a pulmonologist. She is requesting a note for work restrictions and is considering the need for steroids. She also reports a loss of appetite and weight loss. She experiences mild chest tightness during coughing episodes and shortness of breath during long walks or in hot conditions. She is currently on Breo Ellipta and Spiriva, which she administers once in the morning upon waking. She has been using these medications for several years and is confident in her ability to use them correctly. She also uses an albuterol inhaler as needed to manage her breathing. She has found some relief from Mucinex DM cough drops.       Vianney Villarreal presents to [facility]  Cough  Associated symptoms: shortness of breath    PMH includes: asthma    Asthma  She complains of cough and shortness of breath. Her past medical history is significant for asthma.   Shortness of Breath  PMH Includes: asthma   Problem List Items Addressed This Visit        Cardiac and Vasculature    Supraventricular tachycardia (CMS/HCC)     No palpitations on BB  Would like to continue BB, will lower dose metoprolol for lightheadedness.          Relevant Medications    metoPROLOL succinate (TOPROL-XL) 25 MG 24 hr tablet    Pure hypercholesterolemia     Pt has been off rosuva 10 since 10/2021  As she is now 89, no h/o vascular event, will keep her off the medication.          Relevant Medications    metoPROLOL succinate (TOPROL-XL) 25 MG 24 hr tablet    Essential hypertension     HYPERTENSION...  control at present:  BP is on the lower side, pt is lightheaded  medication plan: decrease metoprolol XL to 12.5 mg daily   Lifestyle management:  keep weight down, regular aerobic exercise, limit alcohol  Diet:  low sodium  discussed at length           Relevant Medications    metoPROLOL succinate (TOPROL-XL) 25 MG 24 hr tablet       Endocrine and Metabolic    Vitamin B12 deficiency     Has not been taking vit B12  I have written her a note reminding her to do so  This deficiency can contribute to neurologic symptoms.          Relevant Medications    cyanocobalamin 2000 MCG tablet    Other Relevant Orders    VITAMIN B12 AND FOLATE       Mental Health    History of major depression     Pt has been off Viibryd for a year, no recurrent symptoms of depression.             Symptoms and Signs    Orthostatic lightheadedness - Primary     - Pt is taking an OTC sleep aid 2 x weekly in the middle of the night - many of these can cause a \"hangover\" effect which can cause lightheadedness.  I need to know what this medication is.  Bring the sleep aid to office visits.   - pt has minimal fluids before her exercise class, when she feels lightheaded.  In addition to breakfast, be sure to have an extra glass of water before exercise class.  - BP is on the lower side,  Decrease metoprolol to 12.5 mg daily - written on bottle w/ a deidre to help remind you.  -  "      Objective   Vital Signs:  /70   Pulse 64   Temp 98.2 °F (36.8 °C) (Oral)   Resp 16   Ht 167.6 cm (66\")   Wt 99.2 kg (218 lb 11.2 oz)   SpO2 99%   BMI 35.30 kg/m²   Estimated body mass index is 35.3 kg/m² as calculated from the following:    Height as of this encounter: 167.6 cm (66\").    Weight as of this encounter: 99.2 kg (218 lb 11.2 oz).            Physical Exam  Cardiovascular:      Rate and Rhythm: Normal rate.      Pulses: Normal pulses.      Heart sounds: Normal heart sounds.   Pulmonary:      Effort: Pulmonary effort is normal.      Breath sounds: Wheezing present.   Abdominal:      General: Bowel sounds are normal.      Palpations: Abdomen is soft.   Skin:     General: Skin is warm.   Neurological:      Mental Status: She is alert and oriented to person, place, and time.        Result Review :                Assessment and Plan   Diagnoses and all orders for this visit:    1. Moderate persistent asthma with exacerbation (Primary)  -     methylPREDNISolone (MEDROL) 4 MG dose pack; Take as directed on package instructions.  Dispense: 21 tablet; Refill: 0  -     azithromycin (Zithromax Z-Waylon) 250 MG tablet; Take 2 tablets by mouth on day 1, then 1 tablet daily on days 2-5  Dispense: 6 tablet; Refill: 0  -     Ambulatory Referral to Pulmonology        Assessment & Plan  1. Asthma exacerbation.  - Symptoms include persistent coughing, shortness of breath, and chest tightness, particularly when exposed to irritants at work.  - Physical exam findings include wheezing and difficulty breathing upon exertion.  - Discussed the likely cause being a combination of a recent viral infection and exposure to irritants at her new job. Reviewed her current medication regimen and confirmed proper usage.  - Prescribed a tapering dose of steroids and a Z-Waylon. Referral to a pulmonologist for further evaluation. A letter will be provided to her employer requesting accommodation until her symptoms improve.   " keep track of how often you are lightheaded between now and a month from now.          Relevant Medications    metoPROLOL succinate (TOPROL-XL) 25 MG 24 hr tablet    Other Relevant Orders    COMPREHENSIVE METABOLIC PANEL    CBC WITH DIFFERENTIAL                    Follow Up   No follow-ups on file.  Patient was given instructions and counseling regarding her condition or for health maintenance advice. Please see specific information pulled into the AVS if appropriate.     Patient or patient representative verbalized consent for the use of Ambient Listening during the visit with  Robert Toney MD for chart documentation. 5/13/2025  12:58 EDT

## 2025-05-14 ENCOUNTER — TELEPHONE (OUTPATIENT)
Dept: FAMILY MEDICINE CLINIC | Facility: CLINIC | Age: 50
End: 2025-05-14

## 2025-05-14 NOTE — TELEPHONE ENCOUNTER
Caller: MYLES    Relationship: Other    Best call back number: [phone]     What is the best time to reach you: 8:00 AM TO 4:00 PM    Do you know the name of the person who called: DANIA    What was the call regarding: [employer] STATED THEY FAXED DISABILITY PAPERWORK ON 05-.    [employer] ARE REQUESTING TO KNOW THE STATUS OF THIS.    [employer] STATED THEY NEEDS THIS PAPERWORK SUBMITTED ASAP.    PLEASE CALL TO DISCUSS.

## 2025-05-15 ENCOUNTER — TELEPHONE (OUTPATIENT)
Dept: FAMILY MEDICINE CLINIC | Facility: CLINIC | Age: 50
End: 2025-05-15

## 2025-05-15 NOTE — TELEPHONE ENCOUNTER
Caller: MYLES    Relationship to patient: Other    Best call back number: [phone]     Patient is needing: DANIA IS CALLING TO CHECK AND SEE IF A FAX HAS BEEN RECEIVED ON THIS PATIENT FOR DISABILITY.     PLEASE CALL TO DISCUSS.

## 2025-05-22 ENCOUNTER — TELEPHONE (OUTPATIENT)
Dept: FAMILY MEDICINE CLINIC | Facility: CLINIC | Age: 50
End: 2025-05-22

## 2025-05-22 NOTE — TELEPHONE ENCOUNTER
Caller: Vianney Villarreal    Relationship to patient: Self    Best call back number: [phone]     Patient is needing: PATIENT STATES THAT HER [employer] TOLD HER THEY FAXED OVER INFORMATION ON HER ON 05/19/2025.     PATIENT IS CALLING TO SEE IF THAT HAS BEEN RECEIVED AND GET AN UPDATE.     PLEASE CALL PATIENT TO DISCUSS.

## (undated) DEVICE — PK LAP GYN TOWER 40

## (undated) DEVICE — ENDOPATH XCEL BLADELESS TROCARS WITH STABILITY SLEEVES: Brand: ENDOPATH XCEL

## (undated) DEVICE — PENCL E/S HNDSWCH ROCKR CB

## (undated) DEVICE — SENSR O2 OXIMAX FNGR A/ 18IN NONSTR

## (undated) DEVICE — LN SMPL CO2 SHTRM SD STREAM W/M LUER

## (undated) DEVICE — SINGLE-USE BIOPSY FORCEPS: Brand: RADIAL JAW 4

## (undated) DEVICE — TUBING, SUCTION, 1/4" X 10', STRAIGHT: Brand: MEDLINE

## (undated) DEVICE — GLV SURG BIOGEL LTX PF 7

## (undated) DEVICE — KT ORCA ORCAPOD DISP STRL

## (undated) DEVICE — ENDOPATH PNEUMONEEDLE INSUFFLATION NEEDLES WITH LUER LOCK CONNECTORS 120MM: Brand: ENDOPATH

## (undated) DEVICE — ENDOPATH XCEL DILATING TIP TROCARS WITH STABILITY SLEEVES: Brand: ENDOPATH XCEL

## (undated) DEVICE — CANN O2 ETCO2 FITS ALL CONN CO2 SMPL A/ 7IN DISP LF

## (undated) DEVICE — CATHETER,URETHRAL,REDRUBBER,STRL,16FR: Brand: MEDLINE

## (undated) DEVICE — UNDYED BRAIDED (POLYGLACTIN 910), SYNTHETIC ABSORBABLE SUTURE: Brand: COATED VICRYL

## (undated) DEVICE — ADAPT CLN BIOGUARD AIR/H2O DISP